# Patient Record
Sex: FEMALE | Race: WHITE | NOT HISPANIC OR LATINO | Employment: OTHER | URBAN - METROPOLITAN AREA
[De-identification: names, ages, dates, MRNs, and addresses within clinical notes are randomized per-mention and may not be internally consistent; named-entity substitution may affect disease eponyms.]

---

## 2021-02-04 ENCOUNTER — PRE-ADMISSION TESTING (OUTPATIENT)
Dept: ADMISSIONS | Facility: MEDICAL CENTER | Age: 36
End: 2021-02-04
Attending: OTOLARYNGOLOGY
Payer: COMMERCIAL

## 2021-02-04 RX ORDER — LORATADINE 10 MG
2 CAPSULE ORAL 2 TIMES DAILY PRN
COMMUNITY
End: 2021-08-06

## 2021-02-04 RX ORDER — DIPHENHYDRAMINE HCL 25 MG
25 TABLET ORAL EVERY 6 HOURS PRN
COMMUNITY
End: 2021-08-06

## 2021-02-04 RX ORDER — ALBUTEROL SULFATE 90 UG/1
2 AEROSOL, METERED RESPIRATORY (INHALATION) EVERY 6 HOURS PRN
COMMUNITY

## 2021-02-04 RX ORDER — SUMATRIPTAN 50 MG/1
50 TABLET, FILM COATED ORAL
COMMUNITY
End: 2021-08-06

## 2021-02-04 RX ORDER — VALACYCLOVIR HYDROCHLORIDE 500 MG/1
500 TABLET, FILM COATED ORAL DAILY
COMMUNITY
End: 2021-10-04

## 2021-02-04 RX ORDER — LORATADINE 10 MG/1
10 TABLET ORAL DAILY
COMMUNITY
End: 2021-08-06

## 2021-02-04 SDOH — HEALTH STABILITY: MENTAL HEALTH: HOW OFTEN DO YOU HAVE A DRINK CONTAINING ALCOHOL?: 2-4 TIMES A MONTH

## 2021-02-06 ENCOUNTER — PRE-ADMISSION TESTING (OUTPATIENT)
Dept: ADMISSIONS | Facility: MEDICAL CENTER | Age: 36
End: 2021-02-06
Attending: OTOLARYNGOLOGY
Payer: COMMERCIAL

## 2021-02-06 DIAGNOSIS — Z01.812 PRE-OPERATIVE LABORATORY EXAMINATION: ICD-10-CM

## 2021-02-06 LAB
COVID ORDER STATUS COVID19: NORMAL
SARS-COV-2 RNA RESP QL NAA+PROBE: NOTDETECTED
SPECIMEN SOURCE: NORMAL

## 2021-02-06 PROCEDURE — U0005 INFEC AGEN DETEC AMPLI PROBE: HCPCS

## 2021-02-06 PROCEDURE — C9803 HOPD COVID-19 SPEC COLLECT: HCPCS

## 2021-02-06 PROCEDURE — U0003 INFECTIOUS AGENT DETECTION BY NUCLEIC ACID (DNA OR RNA); SEVERE ACUTE RESPIRATORY SYNDROME CORONAVIRUS 2 (SARS-COV-2) (CORONAVIRUS DISEASE [COVID-19]), AMPLIFIED PROBE TECHNIQUE, MAKING USE OF HIGH THROUGHPUT TECHNOLOGIES AS DESCRIBED BY CMS-2020-01-R: HCPCS

## 2021-02-10 ENCOUNTER — HOSPITAL ENCOUNTER (OUTPATIENT)
Dept: RADIOLOGY | Facility: MEDICAL CENTER | Age: 36
End: 2021-02-10
Payer: COMMERCIAL

## 2021-02-11 ENCOUNTER — HOSPITAL ENCOUNTER (OUTPATIENT)
Facility: MEDICAL CENTER | Age: 36
End: 2021-02-11
Attending: OTOLARYNGOLOGY | Admitting: OTOLARYNGOLOGY
Payer: COMMERCIAL

## 2021-02-11 ENCOUNTER — ANESTHESIA (OUTPATIENT)
Dept: SURGERY | Facility: MEDICAL CENTER | Age: 36
End: 2021-02-11
Payer: COMMERCIAL

## 2021-02-11 ENCOUNTER — ANESTHESIA EVENT (OUTPATIENT)
Dept: SURGERY | Facility: MEDICAL CENTER | Age: 36
End: 2021-02-11
Payer: COMMERCIAL

## 2021-02-11 VITALS
SYSTOLIC BLOOD PRESSURE: 157 MMHG | WEIGHT: 225.75 LBS | HEIGHT: 61 IN | BODY MASS INDEX: 42.62 KG/M2 | DIASTOLIC BLOOD PRESSURE: 81 MMHG | RESPIRATION RATE: 14 BRPM | HEART RATE: 100 BPM | TEMPERATURE: 97.1 F | OXYGEN SATURATION: 94 %

## 2021-02-11 LAB
HCG UR QL: NEGATIVE
PATHOLOGY CONSULT NOTE: NORMAL

## 2021-02-11 PROCEDURE — 700111 HCHG RX REV CODE 636 W/ 250 OVERRIDE (IP): Performed by: ANESTHESIOLOGY

## 2021-02-11 PROCEDURE — 160035 HCHG PACU - 1ST 60 MINS PHASE I: Performed by: OTOLARYNGOLOGY

## 2021-02-11 PROCEDURE — 700105 HCHG RX REV CODE 258: Performed by: OTOLARYNGOLOGY

## 2021-02-11 PROCEDURE — 502573 HCHG PACK, ENT: Performed by: OTOLARYNGOLOGY

## 2021-02-11 PROCEDURE — 500331 HCHG COTTONOID, SURG PATTIE: Performed by: OTOLARYNGOLOGY

## 2021-02-11 PROCEDURE — 501838 HCHG SUTURE GENERAL: Performed by: OTOLARYNGOLOGY

## 2021-02-11 PROCEDURE — 81025 URINE PREGNANCY TEST: CPT

## 2021-02-11 PROCEDURE — 160036 HCHG PACU - EA ADDL 30 MINS PHASE I: Performed by: OTOLARYNGOLOGY

## 2021-02-11 PROCEDURE — A9270 NON-COVERED ITEM OR SERVICE: HCPCS | Performed by: ANESTHESIOLOGY

## 2021-02-11 PROCEDURE — 160029 HCHG SURGERY MINUTES - 1ST 30 MINS LEVEL 4: Performed by: OTOLARYNGOLOGY

## 2021-02-11 PROCEDURE — 700102 HCHG RX REV CODE 250 W/ 637 OVERRIDE(OP)

## 2021-02-11 PROCEDURE — 700101 HCHG RX REV CODE 250: Performed by: OTOLARYNGOLOGY

## 2021-02-11 PROCEDURE — 700102 HCHG RX REV CODE 250 W/ 637 OVERRIDE(OP): Performed by: ANESTHESIOLOGY

## 2021-02-11 PROCEDURE — 160046 HCHG PACU - 1ST 60 MINS PHASE II: Performed by: OTOLARYNGOLOGY

## 2021-02-11 PROCEDURE — 88311 DECALCIFY TISSUE: CPT

## 2021-02-11 PROCEDURE — 160048 HCHG OR STATISTICAL LEVEL 1-5: Performed by: OTOLARYNGOLOGY

## 2021-02-11 PROCEDURE — A9270 NON-COVERED ITEM OR SERVICE: HCPCS

## 2021-02-11 PROCEDURE — 88305 TISSUE EXAM BY PATHOLOGIST: CPT

## 2021-02-11 PROCEDURE — 700101 HCHG RX REV CODE 250: Performed by: ANESTHESIOLOGY

## 2021-02-11 PROCEDURE — 160041 HCHG SURGERY MINUTES - EA ADDL 1 MIN LEVEL 4: Performed by: OTOLARYNGOLOGY

## 2021-02-11 PROCEDURE — C1894 INTRO/SHEATH, NON-LASER: HCPCS | Performed by: OTOLARYNGOLOGY

## 2021-02-11 PROCEDURE — 160002 HCHG RECOVERY MINUTES (STAT): Performed by: OTOLARYNGOLOGY

## 2021-02-11 PROCEDURE — A9270 NON-COVERED ITEM OR SERVICE: HCPCS | Performed by: OTOLARYNGOLOGY

## 2021-02-11 PROCEDURE — 700111 HCHG RX REV CODE 636 W/ 250 OVERRIDE (IP): Performed by: OTOLARYNGOLOGY

## 2021-02-11 PROCEDURE — 160025 RECOVERY II MINUTES (STATS): Performed by: OTOLARYNGOLOGY

## 2021-02-11 PROCEDURE — 160009 HCHG ANES TIME/MIN: Performed by: OTOLARYNGOLOGY

## 2021-02-11 PROCEDURE — 160047 HCHG PACU  - EA ADDL 30 MINS PHASE II: Performed by: OTOLARYNGOLOGY

## 2021-02-11 DEVICE — IMPLANT FUROATE MOMETASONE 8MM (1/EA): Type: IMPLANTABLE DEVICE | Site: NOSE | Status: FUNCTIONAL

## 2021-02-11 RX ORDER — HALOPERIDOL 5 MG/ML
1 INJECTION INTRAMUSCULAR
Status: DISCONTINUED | OUTPATIENT
Start: 2021-02-11 | End: 2021-02-11 | Stop reason: HOSPADM

## 2021-02-11 RX ORDER — OXYCODONE HYDROCHLORIDE AND ACETAMINOPHEN 5; 325 MG/1; MG/1
1 TABLET ORAL
Status: DISCONTINUED | OUTPATIENT
Start: 2021-02-11 | End: 2021-02-11 | Stop reason: HOSPADM

## 2021-02-11 RX ORDER — SODIUM CHLORIDE, SODIUM LACTATE, POTASSIUM CHLORIDE, CALCIUM CHLORIDE 600; 310; 30; 20 MG/100ML; MG/100ML; MG/100ML; MG/100ML
INJECTION, SOLUTION INTRAVENOUS CONTINUOUS
Status: ACTIVE | OUTPATIENT
Start: 2021-02-11 | End: 2021-02-11

## 2021-02-11 RX ORDER — EPINEPHRINE 1 MG/ML
INJECTION INTRAMUSCULAR; INTRAVENOUS; SUBCUTANEOUS
Status: DISCONTINUED
Start: 2021-02-11 | End: 2021-02-11 | Stop reason: HOSPADM

## 2021-02-11 RX ORDER — MEPERIDINE HYDROCHLORIDE 25 MG/ML
12.5 INJECTION INTRAMUSCULAR; INTRAVENOUS; SUBCUTANEOUS
Status: DISCONTINUED | OUTPATIENT
Start: 2021-02-11 | End: 2021-02-11 | Stop reason: HOSPADM

## 2021-02-11 RX ORDER — METOPROLOL TARTRATE 1 MG/ML
1 INJECTION, SOLUTION INTRAVENOUS
Status: DISCONTINUED | OUTPATIENT
Start: 2021-02-11 | End: 2021-02-11 | Stop reason: HOSPADM

## 2021-02-11 RX ORDER — HYDROMORPHONE HYDROCHLORIDE 1 MG/ML
0.1 INJECTION, SOLUTION INTRAMUSCULAR; INTRAVENOUS; SUBCUTANEOUS
Status: DISCONTINUED | OUTPATIENT
Start: 2021-02-11 | End: 2021-02-11 | Stop reason: HOSPADM

## 2021-02-11 RX ORDER — LABETALOL HYDROCHLORIDE 5 MG/ML
5 INJECTION, SOLUTION INTRAVENOUS
Status: DISCONTINUED | OUTPATIENT
Start: 2021-02-11 | End: 2021-02-11 | Stop reason: HOSPADM

## 2021-02-11 RX ORDER — SODIUM CHLORIDE, SODIUM LACTATE, POTASSIUM CHLORIDE, CALCIUM CHLORIDE 600; 310; 30; 20 MG/100ML; MG/100ML; MG/100ML; MG/100ML
INJECTION, SOLUTION INTRAVENOUS CONTINUOUS
Status: DISCONTINUED | OUTPATIENT
Start: 2021-02-11 | End: 2021-02-11 | Stop reason: HOSPADM

## 2021-02-11 RX ORDER — CEFAZOLIN SODIUM 1 G/3ML
INJECTION, POWDER, FOR SOLUTION INTRAMUSCULAR; INTRAVENOUS PRN
Status: DISCONTINUED | OUTPATIENT
Start: 2021-02-11 | End: 2021-02-11 | Stop reason: SURG

## 2021-02-11 RX ORDER — DEXAMETHASONE SODIUM PHOSPHATE 4 MG/ML
INJECTION, SOLUTION INTRA-ARTICULAR; INTRALESIONAL; INTRAMUSCULAR; INTRAVENOUS; SOFT TISSUE PRN
Status: DISCONTINUED | OUTPATIENT
Start: 2021-02-11 | End: 2021-02-11 | Stop reason: SURG

## 2021-02-11 RX ORDER — LIDOCAINE HYDROCHLORIDE 10 MG/ML
INJECTION, SOLUTION INFILTRATION; PERINEURAL
Status: DISCONTINUED
Start: 2021-02-11 | End: 2021-02-11 | Stop reason: HOSPADM

## 2021-02-11 RX ORDER — HYDROMORPHONE HYDROCHLORIDE 1 MG/ML
0.4 INJECTION, SOLUTION INTRAMUSCULAR; INTRAVENOUS; SUBCUTANEOUS
Status: DISCONTINUED | OUTPATIENT
Start: 2021-02-11 | End: 2021-02-11 | Stop reason: HOSPADM

## 2021-02-11 RX ORDER — TRANEXAMIC ACID 100 MG/ML
INJECTION, SOLUTION INTRAVENOUS PRN
Status: DISCONTINUED | OUTPATIENT
Start: 2021-02-11 | End: 2021-02-11 | Stop reason: SURG

## 2021-02-11 RX ORDER — ONDANSETRON 2 MG/ML
INJECTION INTRAMUSCULAR; INTRAVENOUS PRN
Status: DISCONTINUED | OUTPATIENT
Start: 2021-02-11 | End: 2021-02-11 | Stop reason: SURG

## 2021-02-11 RX ORDER — EPINEPHRINE 1 MG/ML(1)
AMPUL (ML) INJECTION
Status: DISCONTINUED
Start: 2021-02-11 | End: 2021-02-11 | Stop reason: HOSPADM

## 2021-02-11 RX ORDER — METOPROLOL TARTRATE 1 MG/ML
INJECTION, SOLUTION INTRAVENOUS PRN
Status: DISCONTINUED | OUTPATIENT
Start: 2021-02-11 | End: 2021-02-11 | Stop reason: SURG

## 2021-02-11 RX ORDER — HYDROMORPHONE HYDROCHLORIDE 1 MG/ML
0.2 INJECTION, SOLUTION INTRAMUSCULAR; INTRAVENOUS; SUBCUTANEOUS
Status: DISCONTINUED | OUTPATIENT
Start: 2021-02-11 | End: 2021-02-11 | Stop reason: HOSPADM

## 2021-02-11 RX ORDER — LIDOCAINE HYDROCHLORIDE AND EPINEPHRINE 10; 10 MG/ML; UG/ML
INJECTION, SOLUTION INFILTRATION; PERINEURAL
Status: DISCONTINUED | OUTPATIENT
Start: 2021-02-11 | End: 2021-02-11 | Stop reason: HOSPADM

## 2021-02-11 RX ORDER — GINSENG 100 MG
CAPSULE ORAL
Status: DISCONTINUED | OUTPATIENT
Start: 2021-02-11 | End: 2021-02-11 | Stop reason: HOSPADM

## 2021-02-11 RX ORDER — EPINEPHRINE 1 MG/ML(1)
AMPUL (ML) INJECTION
Status: DISCONTINUED | OUTPATIENT
Start: 2021-02-11 | End: 2021-02-11 | Stop reason: HOSPADM

## 2021-02-11 RX ORDER — MIDAZOLAM HYDROCHLORIDE 1 MG/ML
INJECTION INTRAMUSCULAR; INTRAVENOUS PRN
Status: DISCONTINUED | OUTPATIENT
Start: 2021-02-11 | End: 2021-02-11 | Stop reason: SURG

## 2021-02-11 RX ORDER — OXYCODONE HYDROCHLORIDE AND ACETAMINOPHEN 5; 325 MG/1; MG/1
2 TABLET ORAL
Status: DISCONTINUED | OUTPATIENT
Start: 2021-02-11 | End: 2021-02-11 | Stop reason: HOSPADM

## 2021-02-11 RX ORDER — BACITRACIN ZINC 500 [USP'U]/G
OINTMENT TOPICAL
Status: DISCONTINUED
Start: 2021-02-11 | End: 2021-02-11 | Stop reason: HOSPADM

## 2021-02-11 RX ORDER — DIPHENHYDRAMINE HYDROCHLORIDE 50 MG/ML
12.5 INJECTION INTRAMUSCULAR; INTRAVENOUS
Status: DISCONTINUED | OUTPATIENT
Start: 2021-02-11 | End: 2021-02-11 | Stop reason: HOSPADM

## 2021-02-11 RX ADMIN — FENTANYL CITRATE 100 MCG: 50 INJECTION, SOLUTION INTRAMUSCULAR; INTRAVENOUS at 09:16

## 2021-02-11 RX ADMIN — PROPOFOL 200 MG: 10 INJECTION, EMULSION INTRAVENOUS at 09:16

## 2021-02-11 RX ADMIN — ONDANSETRON 4 MG: 2 INJECTION INTRAMUSCULAR; INTRAVENOUS at 11:08

## 2021-02-11 RX ADMIN — FENTANYL CITRATE 50 MCG: 50 INJECTION, SOLUTION INTRAMUSCULAR; INTRAVENOUS at 10:30

## 2021-02-11 RX ADMIN — FENTANYL CITRATE 50 MCG: 50 INJECTION, SOLUTION INTRAMUSCULAR; INTRAVENOUS at 12:05

## 2021-02-11 RX ADMIN — FENTANYL CITRATE 50 MCG: 50 INJECTION, SOLUTION INTRAMUSCULAR; INTRAVENOUS at 09:46

## 2021-02-11 RX ADMIN — SODIUM CHLORIDE, POTASSIUM CHLORIDE, SODIUM LACTATE AND CALCIUM CHLORIDE: 600; 310; 30; 20 INJECTION, SOLUTION INTRAVENOUS at 08:38

## 2021-02-11 RX ADMIN — MIDAZOLAM HYDROCHLORIDE 2 MG: 1 INJECTION, SOLUTION INTRAMUSCULAR; INTRAVENOUS at 09:07

## 2021-02-11 RX ADMIN — METOPROLOL TARTRATE 1 MG: 5 INJECTION, SOLUTION INTRAVENOUS at 09:24

## 2021-02-11 RX ADMIN — OXYCODONE HYDROCHLORIDE AND ACETAMINOPHEN 2 TABLET: 5; 325 TABLET ORAL at 12:05

## 2021-02-11 RX ADMIN — SUGAMMADEX 200 MG: 100 INJECTION, SOLUTION INTRAVENOUS at 11:03

## 2021-02-11 RX ADMIN — CEFAZOLIN 2 G: 330 INJECTION, POWDER, FOR SOLUTION INTRAMUSCULAR; INTRAVENOUS at 09:15

## 2021-02-11 RX ADMIN — ROCURONIUM BROMIDE 60 MG: 10 INJECTION, SOLUTION INTRAVENOUS at 09:16

## 2021-02-11 RX ADMIN — POVIDONE IODINE 15 ML: 100 SOLUTION TOPICAL at 08:38

## 2021-02-11 RX ADMIN — FENTANYL CITRATE 50 MCG: 50 INJECTION, SOLUTION INTRAMUSCULAR; INTRAVENOUS at 11:01

## 2021-02-11 RX ADMIN — DEXAMETHASONE SODIUM PHOSPHATE 8 MG: 4 INJECTION, SOLUTION INTRA-ARTICULAR; INTRALESIONAL; INTRAMUSCULAR; INTRAVENOUS; SOFT TISSUE at 09:04

## 2021-02-11 RX ADMIN — FENTANYL CITRATE 25 MCG: 50 INJECTION, SOLUTION INTRAMUSCULAR; INTRAVENOUS at 13:40

## 2021-02-11 RX ADMIN — FENTANYL CITRATE 25 MCG: 50 INJECTION, SOLUTION INTRAMUSCULAR; INTRAVENOUS at 12:33

## 2021-02-11 RX ADMIN — TRANEXAMIC ACID 1000 MG: 100 INJECTION, SOLUTION INTRAVENOUS at 11:29

## 2021-02-11 ASSESSMENT — PAIN DESCRIPTION - PAIN TYPE
TYPE: SURGICAL PAIN

## 2021-02-11 ASSESSMENT — PAIN SCALES - GENERAL: PAIN_LEVEL: 2

## 2021-02-11 NOTE — ANESTHESIA PROCEDURE NOTES
Airway    Date/Time: 2/11/2021 9:20 AM  Performed by: Michi Cantu D.O.  Authorized by: Michi Cantu D.O.     Location:  OR  Urgency:  Elective  Indications for Airway Management:  Anesthesia      Spontaneous Ventilation: absent    Sedation Level:  Deep  Preoxygenated: Yes    Patient Position:  Sniffing  Mask Difficulty Assessment:  1 - vent by mask  Final Airway Type:  Endotracheal airway  Final Endotracheal Airway:  ETT  Cuffed: Yes    Technique Used for Successful ETT Placement:  Direct laryngoscopy    Insertion Site:  Oral  Blade Type:  Aren  Laryngoscope Blade/Videolaryngoscope Blade Size:  3  ETT Size (mm):  7.5  Measured from:  Lips  ETT to Lips (cm):  21  Placement Verified by: auscultation and capnometry    Cormack-Lehane Classification:  Grade I - full view of glottis  Number of Attempts at Approach:  1

## 2021-02-11 NOTE — ANESTHESIA POSTPROCEDURE EVALUATION
Patient: Guilherme Flaherty    Procedure Summary     Date: 02/11/21 Room / Location: [facility] ROOM 22 / SURGERY SAME DAY [facility] [place]    Anesthesia Start: 0904 Anesthesia Stop: 1151    Procedures:       SEPTOPLASTY, NOSE (Bilateral Nose)      STEREOTACTIC COMPUTER ASSISTED PROCEDURE CRANIAL,EXTRADURAL-NAVIGATIONAL (Bilateral Nose)      ENDOSCOPY, PARANASAL SINUS, WITH TOTAL ETHMOIDECTOMY, SPHENOIDOTOMY, MAXILLARY ANTROSTOMY, SPHEN+MAX SIN TISS REM, AND EXPLORATION FRONT SIN (Bilateral Nose) Diagnosis: (OTHER POLYP OF SINUS, CHRONIC PANSINUSITIS)    Surgeons: Joao Luque M.D. Responsible Provider: Michi Cantu D.O.    Anesthesia Type: general ASA Status: 2          Final Anesthesia Type: general  Last vitals  BP   Blood Pressure: (P) 133/80    Temp   (P) 36.2 °C (97.1 °F)    Pulse   95   Resp   (P) 20    SpO2   95 %      Anesthesia Post Evaluation    Patient location during evaluation: PACU  Patient participation: complete - patient participated  Level of consciousness: awake and alert  Pain score: 2    Airway patency: patent  Anesthetic complications: no  Cardiovascular status: hemodynamically stable  Respiratory status: acceptable  Hydration status: euvolemic    PONV: none          There were no known complications for this encounter.

## 2021-02-11 NOTE — OR SURGEON
Immediate Post OP Note    PreOp Diagnosis: CRS with polyps, septal deviation     PostOp Diagnosis: same     Procedure(s):  1. septo  2. B max   3. B total ethmoid  4. B sphenoid  5. B frontal   6. With IGS   Surgeon(s):  Joao Luque M.D.    Anesthesiologist/Type of Anesthesia:  Anesthesiologist: Michi Cantu D.O./General    Surgical Staff:  Circulator: Jessica Adame R.N.  Scrub Person: Blas Banda    Specimens removed if any:  ID Type Source Tests Collected by Time Destination   A : Left sinus contents Tissue Sinus PATHOLOGY SPECIMEN Joao Luque M.D. 2/11/2021  9:51 AM    B : Right sinus contents Tissue Sinus PATHOLOGY SPECIMEN Joao Luque M.D. 2/11/2021  9:56 AM        Estimated Blood Loss: 100cc    Findings: polyps    Complications: none        2/11/2021 11:41 AM Joao Luque M.D.

## 2021-02-11 NOTE — DISCHARGE INSTRUCTIONS
ACTIVITY: Rest and take it easy for the first 24 hours.  A responsible adult is recommended to remain with you during that time.  It is normal to feel sleepy.  We encourage you to not do anything that requires balance, judgment or coordination.    MILD FLU-LIKE SYMPTOMS ARE NORMAL. YOU MAY EXPERIENCE GENERALIZED MUSCLE ACHES, THROAT IRRITATION, HEADACHE AND/OR SOME NAUSEA.    FOR 24 HOURS DO NOT:  Drive, operate machinery or run household appliances.  Drink beer or alcoholic beverages.   Make important decisions or sign legal documents.    SPECIAL INSTRUCTIONS:     neilmed rinses 5x/day start tomorrow     DIET: To avoid nausea, slowly advance diet as tolerated, avoiding spicy or greasy foods for the first day.  Add more substantial food to your diet according to your physician's instructions.  Babies can be fed formula or breast milk as soon as they are hungry.  INCREASE FLUIDS AND FIBER TO AVOID CONSTIPATION.    SURGICAL DRESSING/BATHING: Ok to shower tomorrow, do not submerge nose in water, avoid getting water in nose.     FOLLOW-UP APPOINTMENT:  A follow-up appointment should be arranged with your doctor in 1 week; call to schedule.    You should CALL YOUR PHYSICIAN if you develop:  Fever greater than 101 degrees F.  Pain not relieved by medication, or persistent nausea or vomiting.  Excessive bleeding (blood soaking through dressing) or unexpected drainage from the wound.  Extreme redness or swelling around the incision site, drainage of pus or foul smelling drainage.  Inability to urinate or empty your bladder within 8 hours.  Problems with breathing or chest pain.    You should call 911 if you develop problems with breathing or chest pain.  If you are unable to contact your doctor or surgical center, you should go to the nearest emergency room or urgent care center.      Dr. Luque's Telephone #:  [phone]    If any questions arise, call your doctor.  If your doctor is not available, please feel free to  call the Surgical Center at [phone]. The Contact Center is open Monday through Friday 7AM to 5PM and may speak to a nurse at [phone], or toll free at [phone].     A registered nurse may call you a few days after your surgery to see how you are doing after your procedure.    MEDICATIONS: Resume taking daily medication.  Take prescribed pain medication with food.  If no medication is prescribed, you may take non-aspirin pain medication if needed.  PAIN MEDICATION CAN BE VERY CONSTIPATING.  Take a stool softener or laxative such as senokot, pericolace, or milk of magnesia if needed.    Prescription given for prednisone, augmentin, diflucan at pre op appointment.  Last pain medication given at 12:05 pm (Percocet 10 mg).    If your physician has prescribed pain medication that includes Acetaminophen (Tylenol), do not take additional Acetaminophen (Tylenol) while taking the prescribed medication.    Depression / Suicide Risk    As you are discharged from this [facility] facility, it is important to learn how to keep safe from harming yourself.    Recognize the warning signs:  · Abrupt changes in personality, positive or negative- including increase in energy   · Giving away possessions  · Change in eating patterns- significant weight changes-  positive or negative  · Change in sleeping patterns- unable to sleep or sleeping all the time   · Unwillingness or inability to communicate  · Depression  · Unusual sadness, discouragement and loneliness  · Talk of wanting to die  · Neglect of personal appearance   · Rebelliousness- reckless behavior  · Withdrawal from people/activities they love  · Confusion- inability to concentrate     If you or a loved one observes any of these behaviors or has concerns about self-harm, here's what you can do:  · Talk about it- your feelings and reasons for harming yourself  · Remove any means that you might use to hurt yourself (examples: pills, rope, extension cords,  firearm)  · Get professional help from the community (Mental Health, Substance Abuse, psychological counseling)  · Do not be alone:Call your Safe Contact- someone whom you trust who will be there for you.  · Call your local CRISIS HOTLINE 406-2327 or 708-605-5705  · Call your local Children's Mobile Crisis Response Team [place] (860) 863-5780 or www.[employer]  · Call the toll free National Suicide Prevention Hotlines   · National Suicide Prevention Lifeline 384-718-VZWP (2240)  · National Hope Line Network 800-SUICIDE (608-5449)

## 2021-02-11 NOTE — OR NURSING
1257 assumed care from TADEO Medrano. Pt states pain level 3/10.     1330 changed into cloth gown for comfort    1340 IV fentanyl 25mcg given for 4/10 nose and face pain. Handoff to TADEO Medrano

## 2021-02-11 NOTE — OP REPORT
DATE OF SERVICE:  02/11/2021     PREOPERATIVE DIAGNOSES:  1.  Chronic sinusitis with polyps.  2.  Aspirin-exacerbated respiratory disease.  3.  Septal deviation.     POSTOPERATIVE DIAGNOSES:  1.  Chronic sinusitis with polyps.  2.  Aspirin-exacerbated respiratory disease.  3.  Septal deviation.     PROCEDURES PERFORMED:  1.  Septoplasty.  2.  Bilateral frontal sinusotomy.  3.  Bilateral total ethmoidectomy.  4.  Bilateral maxillary antrostomy with tissue removal.  5.  Bilateral sphenoidotomy and tissue removal.  6.  With image guidance.     SURGEON:  Joao Luque MD     ASSISTANT:  None.     ANESTHESIA:  General endotracheal (none).     ESTIMATED BLOOD LOSS:  100 mL.     COMPLICATIONS:  None.     SPECIMENS:  Right and left sinus contents.     INDICATIONS:  This is a 35-year-old female who has aspirin-exacerbated   respiratory disease.  She has chronic sinusitis with polyps.  She underwent   sinus surgery previously with Dr. Moore.  She has had a massive recurrence of   her polyps.  She has failed prednisone, Augmentin and budesonide rinses.    After discussing the risks, benefits and alternatives, she elected to undergo   the above procedure.  Of note, I discussed the polyps will recur.  She also   has pretty severe headaches preoperatively, which may or may not be related to   her chronic sinusitis.     DESCRIPTION OF PROCEDURE:  The patient was brought to the operating room and   was intubated by anesthesia, turned 90 degrees.  She was given preoperative   antibiotics and steroids.  She was draped in the usual sterile fashion.  A   timeout was performed.  The 1:1,000 epinephrine soaked pledgets stained with   fluorescein were placed in the nasal cavity.  Once vasoconstriction was   allowed to take effect, I injected the polyps since they were almost coming   out of the nose.  I injected the septum as well.  Later on, once I had   debulked the polyps, I injected the right and left maxillary lines and    sphenopalatine region with 1% lidocaine with 1:100,000 epinephrine.  I began   surgery on the left hand side.  There were large polyps, here, which I   removed.  I was able then to identify the middle turbinate, which actually was   not growing polyps. I resected polyps in the anterior ethmoids.  I registered   the image guidance, was found to be in good fidelity in all 3 planes.  It was   required for this case given the involving the frontal sphenoid polyps and   previous surgery.     I began on the left side I cleared out the nasal cavity polyps.  I cleared out the   anterior ethmoid and then I used a 30-degree endoscope.  The left maxillary   was filled with polyps.  I used a 90-degree microdebrider blade to resect   these.  I used a curved scissor to widen the antrostomy posteriorly and the   straight through-cutting instruments to remove this.  There was some bleeding   from probably the vessel going to the middle turbinate, which was controlled   with suction cautery on a coag of 15.  I resected some residual uncinate and   then I ensured that the natural ostium was confluent with the surgical ostium.    I then used a 90-degree microdebrider blade to clear all polyps as much as I   could reach from the maxillary.  I switched back to the 0 and the straight   microdebrider blade, which was image guided I used throughout the case.  I   then went through the basal lamella at the intersection of horizontal vertical   portion.  I visualized the superior turbinate.  I got back to the face of the   sphenoid and resected polyps identified.  I was able to cannulate the   sphenoid and then I widened it.  I used a mushroom punch and Kerrisons to   remove the face of the sphenoid.  I identified the skull base and the orbit.    I then cleared partitions in a posterior to anterior manner in the ethmoids.    I performed agger nasi punch.  I then was able to cannulate both the   supraorbital cell and the frontal.  I  switched over the 45-degree scope and I   used a Cobra to widen this further and the 70-degree curette.  At this point,   I was happy with my dissection.       I debulked the polyps filling the right nasal cavity that were almost growing out of the   nostril, so I could inject and also to give access for the septoplasty.    I then turned my attention to the septum.  I   made an incision a centimeter back from the caudal septum on the right hand   side, elevating the subperichondrial subperiosteal plane on the right.  I went   through the septum posterior to my incision about 0.5 cm, elevated a   subperichondrial subperiosteal plane on the left hand side.  I resected the   deviated bone and cartilage until the septal deviation including the right   spur was reduced.  At the end of the case, I closed the septum with a quilting   4-0 plain gut suture.  I then turned my attention to the right sinuses.    Previously,  Now   that I could visualize the middle turbinate, which had been previously   partially resected, I could see polyps growing off the orbit, which I   resected.  I resected the intact uncinate.  I used a 30-degree endoscope.  I   widened the antrostomy widely posteriorly using a scissor, side biter and the   microdebrider.  I used a 90-degree microdebrider to resect polyps out of the   maxillary.  The natural ostium was confluent with the surgical ostium.  I   switched back to the 0.  There were some anterior ethmoid polyps and   partitions that I resected.  I went through the basal lamella at the   intersection of horizontal and vertical portion.  I visualized the superior   turbinate, which was polypoid.  I resected the inferior aspect of this.  I   identified the natural ostium of the sphenoid and I widened it using   microdebrider, Kerrisons and the mushroom punch.  I then cleared.  I could   identify the orbit and skull base.  I then cleared partitions in a posterior   to anterior manner.  I then  performed an agger nasi punch.  I switched over to   the 45-degree scope.  I could cannulate her frontal anatomy.  It was more   complicated on this side.  She had a very small medially based opening   anteriorly.  I was able to cannulate this and then using a Cobra and the   curved suction, I was able to resect the partition, the type 1 or type 2   anterior ethmoid cell that was along the orbit as there was little to resect   this until I could pass the image guidance suction into the frontal.  I used a   70-degree to visualize it as well.  I then was happy with how they were opened to the widest that I   could get it with the limitations of her anatomy.  Hemostasis was good.  I   closed the septum as described.  I then used a 45-degree endoscope to place   the Propel contours into each frontal sinus until they were in good position.    Hemostasis was good.  I placed bacitracin-coated finger cotton in each middle   meatus and secured it with 2 x 2.  The patient tolerated the procedure well.        ______________________________  MD TYSON Taylor/KAMILAH    DD:  02/11/2021 11:48  DT:  02/11/2021 13:07    Job#:  [id number]

## 2021-02-11 NOTE — OR NURSING
1147 Pt to PACU from OR. Report from anesthesia and OR RN. OA in place, on 8 L mask O2, chin lift by anesthesiologist. Respirations even and unlabored. VSS.     1148 Arousing, OA dc'd by anesthesia.     1205 Medicated for 7/10 surgical pain.    1220 Medicated again for 4/10 pain.    1257 Report to Anayeli MARIEE RN. Meets phase II criteria.    1340 Re assumed care. Medicated for 6/10 pain. Declines further needs at this time.    1410 States pain is 5/10, burning in nose. Offered narcotic pain intervention, declining at this time. DC'd nasal fingercotts.    1429 Waking at this time, declines needs.     1440 Ready for dc, changing independently. Instructions given over phone to .    1452 Discharge orders received. Meets discharge criteria at this time. Tolerable pain. No nausea. Tolerting PO. On RA. All lines and monitors discontinued. Reviewed discharge paperwork with pt. Discussed diet, activity, medications, follow up care and worsening symptoms. No questions at this time. Pt to be discharged to home via private vehicle. Pt escorted out of department in wheelchair with CNA and all belongings including glasses.

## 2021-02-11 NOTE — ANESTHESIA TIME REPORT
Anesthesia Start and Stop Event Times     Date Time Event    2/11/2021 0850 Ready for Procedure     0904 Anesthesia Start     1151 Anesthesia Stop        Responsible Staff  02/11/21    Name Role Begin End    Michi Cantu D.O. Anesth 0904 1151        Preop Diagnosis (Free Text):  Pre-op Diagnosis     OTHER POLYP OF SINUS, CHRONIC PANSINUSITIS        Preop Diagnosis (Codes):    Post op Diagnosis  Chronic pansinusitis      Premium Reason  Non-Premium    Comments:

## 2021-02-11 NOTE — ANESTHESIA PREPROCEDURE EVALUATION
Relevant Problems   CARDIAC   (+) Migraine       Physical Exam    Anesthesia Plan    ASA 2       Plan - general       Airway plan will be ETT          Induction: intravenous    Postoperative Plan: Postoperative administration of opioids is intended.    Pertinent diagnostic labs and testing reviewed    Informed Consent:    Anesthetic plan and risks discussed with patient.    Use of blood products discussed with: patient whom consented to blood products.

## 2021-02-15 ENCOUNTER — PRE-ADMISSION TESTING (OUTPATIENT)
Dept: ADMISSIONS | Facility: MEDICAL CENTER | Age: 36
End: 2021-02-15
Attending: OTOLARYNGOLOGY
Payer: COMMERCIAL

## 2021-02-15 DIAGNOSIS — Z01.812 PRE-OPERATIVE LABORATORY EXAMINATION: ICD-10-CM

## 2021-02-15 PROCEDURE — U0003 INFECTIOUS AGENT DETECTION BY NUCLEIC ACID (DNA OR RNA); SEVERE ACUTE RESPIRATORY SYNDROME CORONAVIRUS 2 (SARS-COV-2) (CORONAVIRUS DISEASE [COVID-19]), AMPLIFIED PROBE TECHNIQUE, MAKING USE OF HIGH THROUGHPUT TECHNOLOGIES AS DESCRIBED BY CMS-2020-01-R: HCPCS

## 2021-02-15 PROCEDURE — U0005 INFEC AGEN DETEC AMPLI PROBE: HCPCS

## 2021-02-15 PROCEDURE — C9803 HOPD COVID-19 SPEC COLLECT: HCPCS

## 2021-02-18 ENCOUNTER — HOSPITAL ENCOUNTER (OUTPATIENT)
Facility: MEDICAL CENTER | Age: 36
End: 2021-02-18
Attending: OTOLARYNGOLOGY | Admitting: OTOLARYNGOLOGY
Payer: COMMERCIAL

## 2021-02-18 VITALS
RESPIRATION RATE: 16 BRPM | BODY MASS INDEX: 43.29 KG/M2 | DIASTOLIC BLOOD PRESSURE: 85 MMHG | TEMPERATURE: 98.2 F | HEART RATE: 96 BPM | HEIGHT: 61 IN | OXYGEN SATURATION: 96 % | WEIGHT: 229.28 LBS | SYSTOLIC BLOOD PRESSURE: 159 MMHG

## 2021-02-18 PROCEDURE — 160028 HCHG SURGERY MINUTES - 1ST 30 MINS LEVEL 3: Performed by: OTOLARYNGOLOGY

## 2021-02-18 PROCEDURE — 160046 HCHG PACU - 1ST 60 MINS PHASE II: Performed by: OTOLARYNGOLOGY

## 2021-02-18 PROCEDURE — 500331 HCHG COTTONOID, SURG PATTIE: Performed by: OTOLARYNGOLOGY

## 2021-02-18 PROCEDURE — 160048 HCHG OR STATISTICAL LEVEL 1-5: Performed by: OTOLARYNGOLOGY

## 2021-02-18 PROCEDURE — 160025 RECOVERY II MINUTES (STATS): Performed by: OTOLARYNGOLOGY

## 2021-02-18 PROCEDURE — 700102 HCHG RX REV CODE 250 W/ 637 OVERRIDE(OP): Performed by: OTOLARYNGOLOGY

## 2021-02-18 PROCEDURE — 502588 HCHG PACK, MINOR: Performed by: OTOLARYNGOLOGY

## 2021-02-18 PROCEDURE — A9270 NON-COVERED ITEM OR SERVICE: HCPCS | Performed by: OTOLARYNGOLOGY

## 2021-02-18 RX ORDER — AMOXICILLIN AND CLAVULANATE POTASSIUM 875; 125 MG/1; MG/1
1 TABLET, FILM COATED ORAL 2 TIMES DAILY
COMMUNITY
End: 2021-08-06

## 2021-02-18 RX ORDER — LIDOCAINE HYDROCHLORIDE 40 MG/ML
INJECTION, SOLUTION RETROBULBAR
Status: DISCONTINUED
Start: 2021-02-18 | End: 2021-02-18 | Stop reason: HOSPADM

## 2021-02-18 RX ORDER — OXYMETAZOLINE HYDROCHLORIDE 0.05 G/100ML
SPRAY NASAL
Status: DISCONTINUED
Start: 2021-02-18 | End: 2021-02-18 | Stop reason: HOSPADM

## 2021-02-18 RX ORDER — PREDNISONE 10 MG/1
40 TABLET ORAL DAILY
COMMUNITY
End: 2021-08-06

## 2021-02-18 RX ORDER — SUMATRIPTAN 50 MG/1
50 TABLET, FILM COATED ORAL
Status: DISCONTINUED | OUTPATIENT
Start: 2021-02-18 | End: 2021-02-18 | Stop reason: HOSPADM

## 2021-02-18 RX ADMIN — SUMATRIPTAN SUCCINATE 50 MG: 50 TABLET ORAL at 15:10

## 2021-02-18 ASSESSMENT — PAIN DESCRIPTION - PAIN TYPE: TYPE: SURGICAL PAIN

## 2021-02-18 NOTE — OR NURSING
1425- P-96, 02-94, BP-170/96                                                                                             1435-P-96-, 02-97, BP-166/104

## 2021-02-18 NOTE — DISCHARGE INSTRUCTIONS
ACTIVITY: Rest and take it easy for the first 24 hours.  A responsible adult is recommended to remain with you during that time.  It is normal to feel sleepy.  We encourage you to not do anything that requires balance, judgment or coordination.    MILD FLU-LIKE SYMPTOMS ARE NORMAL. YOU MAY EXPERIENCE GENERALIZED MUSCLE ACHES, THROAT IRRITATION, HEADACHE AND/OR SOME NAUSEA.    FOR 24 HOURS DO NOT:  Drive, operate machinery or run household appliances.  Drink beer or alcoholic beverages.   Make important decisions or sign legal documents.    DIET: To avoid nausea, slowly advance diet as tolerated, avoiding spicy or greasy foods for the first day.  Add more substantial food to your diet according to your physician's instructions.  Babies can be fed formula or breast milk as soon as they are hungry.  INCREASE FLUIDS AND FIBER TO AVOID CONSTIPATION.    FOLLOW-UP APPOINTMENT:  A follow-up appointment should be arranged with Dr. Luque [phone]; call to schedule.    You should CALL YOUR PHYSICIAN if you develop:  Fever greater than 101 degrees F.  Pain not relieved by medication, or persistent nausea or vomiting.  Excessive bleeding (blood soaking through dressing) or unexpected drainage from the wound.  Extreme redness or swelling around the incision site, drainage of pus or foul smelling drainage.  Inability to urinate or empty your bladder within 8 hours.  Problems with breathing or chest pain.    You should call 911 if you develop problems with breathing or chest pain.  If you are unable to contact your doctor or surgical center, you should go to the nearest emergency room or urgent care center.    Physician's telephone #: Dr. Luque [phone]    If any questions arise, call your doctor.  If your doctor is not available, please feel free to call the Surgical Center at [phone]. The Contact Center is open Monday through Friday 7AM to 5PM and may speak to a nurse at [phone], or toll free at  [phone].     A registered nurse may call you a few days after your surgery to see how you are doing after your procedure.    MEDICATIONS: Resume taking daily medication.  Take prescribed pain medication with food.  If no medication is prescribed, you may take non-aspirin pain medication if needed.  PAIN MEDICATION CAN BE VERY CONSTIPATING.  Take a stool softener or laxative such as senokot, pericolace, or milk of magnesia if needed.    If your physician has prescribed pain medication that includes Acetaminophen (Tylenol), do not take additional Acetaminophen (Tylenol) while taking the prescribed medication.    Depression / Suicide Risk    As you are discharged from this [facility] facility, it is important to learn how to keep safe from harming yourself.    Recognize the warning signs:  · Abrupt changes in personality, positive or negative- including increase in energy   · Giving away possessions  · Change in eating patterns- significant weight changes-  positive or negative  · Change in sleeping patterns- unable to sleep or sleeping all the time   · Unwillingness or inability to communicate  · Depression  · Unusual sadness, discouragement and loneliness  · Talk of wanting to die  · Neglect of personal appearance   · Rebelliousness- reckless behavior  · Withdrawal from people/activities they love  · Confusion- inability to concentrate     If you or a loved one observes any of these behaviors or has concerns about self-harm, here's what you can do:  · Talk about it- your feelings and reasons for harming yourself  · Remove any means that you might use to hurt yourself (examples: pills, rope, extension cords, firearm)  · Get professional help from the community (Mental Health, Substance Abuse, psychological counseling)  · Do not be alone:Call your Safe Contact- someone whom you trust who will be there for you.  · Call your local CRISIS HOTLINE 364-9258 or 943-935-3827  · Call your local Children's Mobile  Crisis Response Team [place] (502) 667-3682 or www.[employer]  · Call the toll free National Suicide Prevention Hotlines   · National Suicide Prevention Lifeline 051-662-XAFW (7988)  · National Hope Line Network 800-SUICIDE (245-9784)

## 2021-02-18 NOTE — OR SURGEON
Immediate Post OP Note    PreOp Diagnosis: crs with polyps    PostOp Diagnosis: same    Procedure(s):  IRRIGATION, PARANASAL SINUS - INTRAOP POSTOP SINUS DEBRIDEMENT    Surgeon(s):  Joao Luque M.D.    Anesthesiologist/Type of Anesthesia:  No anesthesia staff entered./None    Surgical Staff:  Circulator: Sierra Villafuerte R.N.  Scrub Person: Marjorie Samson    Specimens removed if any:  * No specimens in log *    Estimated Blood Loss: none    Findings: healing appropriately     Complications: none        2/18/2021 2:42 PM Joao Luque M.D.

## 2021-02-18 NOTE — OR NURSING
1445-Pt arrived from OR. Report received.Pt meets phase two criteria.    1510- Imitrex given.    1520- Pt discharged. All personal belongings with pt.

## 2021-02-18 NOTE — OP REPORT
DATE OF SERVICE:  02/18/2021     PREOPERATIVE DIAGNOSES:  1.  Chronic sinusitis with polyps.  2.  Aspirin-exacerbated respiratory disease.     POSTOPERATIVE DIAGNOSES:  1.  Chronic sinusitis with polyps.  2.  Aspirin-exacerbated respiratory disease.     PROCEDURE:  Bilateral endoscopic sinus debridement.     SURGEON:  Joao Luque MD     ASSISTANT:  None.     ANESTHESIA:  None.     COMPLICATIONS:  None.     ESTIMATED BLOOD LOSS:  None.     INDICATIONS:  This is a 35-year-old female who has aspirin-exacerbated   respiratory disease.  She underwent sinus surgery last week with me for her   polyps.  She had a preoperative headache that has continued.  She does have   migraines.  She is having some congestion.  Her smell is decreased.  Her   pathology showed chronic sinusitis.  After discussing the risks, benefits and   alternatives, she elected to undergo the above procedure.     DESCRIPTION OF PROCEDURE:  The patient was brought to the operating room on a   gurney.  She was draped in sterile fashion.  A timeout was performed.  A   0-degree endoscope was used to visualize the nasal cavity.  A 4% lidocaine and   Afrin pledgets were placed in the nose.  The septum was deviated to the right   mildly.  There was some debris that was removed and then debris was removed   from the ethmoid and sphenoids,  appeared to be healing appropriately.  On the   left hand side, old blood and crusting and mucus and debris were suctioned   from the ethmoid, maxillary and sphenoid, appeared to be healing   appropriately.  A 30-degree endoscope was used to visualize the frontals.    There were Propel contours that were sitting in appropriate position with the   frontals widely patent.  The patient tolerated the procedure well.        ______________________________  MD TYSON Taylor/MARLYN    DD:  02/18/2021 14:44  DT:  02/18/2021 15:26    Job#:  [id number]

## 2021-07-28 ENCOUNTER — HOSPITAL ENCOUNTER (OUTPATIENT)
Dept: RADIOLOGY | Facility: MEDICAL CENTER | Age: 36
End: 2021-07-28
Payer: COMMERCIAL

## 2021-07-30 ENCOUNTER — HOSPITAL ENCOUNTER (OUTPATIENT)
Dept: RADIOLOGY | Facility: MEDICAL CENTER | Age: 36
End: 2021-07-30
Attending: OBSTETRICS & GYNECOLOGY
Payer: COMMERCIAL

## 2021-07-30 DIAGNOSIS — N63.15 BREAST LUMP ON RIGHT SIDE AT 6 O'CLOCK POSITION: ICD-10-CM

## 2021-07-30 PROCEDURE — 76642 ULTRASOUND BREAST LIMITED: CPT | Mod: RT

## 2021-07-30 PROCEDURE — G0279 TOMOSYNTHESIS, MAMMO: HCPCS

## 2021-08-06 ENCOUNTER — OFFICE VISIT (OUTPATIENT)
Dept: NEUROLOGY | Facility: MEDICAL CENTER | Age: 36
End: 2021-08-06
Attending: PSYCHIATRY & NEUROLOGY
Payer: COMMERCIAL

## 2021-08-06 VITALS
OXYGEN SATURATION: 94 % | BODY MASS INDEX: 47.18 KG/M2 | SYSTOLIC BLOOD PRESSURE: 124 MMHG | WEIGHT: 240.3 LBS | HEIGHT: 60 IN | DIASTOLIC BLOOD PRESSURE: 86 MMHG | HEART RATE: 97 BPM

## 2021-08-06 DIAGNOSIS — G43.109 MIGRAINE WITH AURA AND WITHOUT STATUS MIGRAINOSUS, NOT INTRACTABLE: Primary | ICD-10-CM

## 2021-08-06 PROCEDURE — 99204 OFFICE O/P NEW MOD 45 MIN: CPT | Performed by: PSYCHIATRY & NEUROLOGY

## 2021-08-06 PROCEDURE — 99211 OFF/OP EST MAY X REQ PHY/QHP: CPT | Performed by: PSYCHIATRY & NEUROLOGY

## 2021-08-06 RX ORDER — AMITRIPTYLINE HYDROCHLORIDE 10 MG/1
TABLET, FILM COATED ORAL
Qty: 90 TABLET | Refills: 0 | Status: SHIPPED | OUTPATIENT
Start: 2021-08-06 | End: 2021-09-07

## 2021-08-06 RX ORDER — SUMATRIPTAN 50 MG/1
TABLET, FILM COATED ORAL
Qty: 11 TABLET | Refills: 3 | Status: SHIPPED | OUTPATIENT
Start: 2021-08-06 | End: 2021-10-19

## 2021-08-06 RX ORDER — MONTELUKAST SODIUM 10 MG/1
10 TABLET ORAL DAILY
COMMUNITY
Start: 2021-02-12

## 2021-08-06 NOTE — PROGRESS NOTES
"[facility] NEUROLOGY  GENERAL NEUROLOGY  NEW PATIENT VISIT    Referral source: none    CC: \"frequent headaches\"    HISTORY OF ILLNESS:  Guilherme Flaherty is a 36 y.o. woman with a history most notable for insomnia, migraine, and depression.  Today, she was unaccomapne, and she provided the following history:    The following is a summary of headache symptoms, presented in my standard format:    Family History: mother, aunts, sisters, grandfather all have migraines  Age at onset: 9 years old  Location: last year: bi-occipital, worse on the right  Radiation: last year: into the right eye  Frequency: migraines: 2-3/month, baseline headache: daily  Duration: all day, fades around 15:00-16:00  Headache Days/Month: 30/30  Quality: variable: \"pounding, sharp\"  Intensity: 10/10  Aura: visual (spots)  Photophobia/Phonophobia/Nausea/Vomiting: yes/yes/yes/yes  Provoked by Physical Activity?:   Triggers: none identified  Associated Symptoms:   Autonomic Signs (such as ptosis, miosis, conjunctival injection, rhinorrhea, increased lacrimation):   Head Trauma:   Association with Menses: none, s/p hysterectomy in 2008  ED Visits:   Hospitalizations:   Missed Work Days (in-home ): none  Sleep: 6-8, interrupted  Caffeine Intake: 1 coffee/morning, sometimes tea in the afternoon  Hydration: keeps well hydrated  Nutrition: skips meals sometimes  Exercise:   Analgesic Overuse:     Current Medication Regimen:  - sumatriptan: 50 is helpful if she can go to bed    Medications Tried: Response  Preventive:  - topiramate: 50 mg caused side effects (falls, dropping objects)    Abortive:  -     Medications Not Tried:  - propranolol: will avoid due to presence of asthma    2014:  Guilherme underwent sinus surgery.    2/2021:  She underwent a second sinus surgery.    MEDICAL AND SURGICAL HISTORY:  Past Medical History:   Diagnosis Date   • Abnormal weight gain    • Allergic rhinitis, cause unspecified    • Alopecia, unspecified    • " Asthma 02/04/2021    Inhaler use daily and PRN.   • Depressive disorder, not elsewhere classified    • Dermatitis with stretch marks opening   • Environmental allergies     Cats, live stock, dogs, hola brush etc...   • Family history of other chronic respiratory conditions    • Insomnia, unspecified    • Open wound of abdominal wall, anterior, without mention of complication    • Other atopic dermatitis and related conditions    • Other extrapyramidal disease and abnormal movement disorder    • Other malaise and fatigue    • Pap smear  10/2006   • Pneumococcal infection    • Pneumonia 2014   • Snoring    • Unspecified migraine      Past Surgical History:   Procedure Laterality Date   • SINUS WASHING Bilateral 2/18/2021    Procedure: IRRIGATION, PARANASAL SINUS - INTRAOP POSTOP SINUS DEBRIDEMENT;  Surgeon: Joao Lquue M.D.;  Location: SURGERY SAME DAY [facility];  Service: Ent   • PB REPAIR OF NASAL SEPTUM Bilateral 2/11/2021    Procedure: SEPTOPLASTY, NOSE;  Surgeon: Joao Luque M.D.;  Location: SURGERY SAME DAY [facility];  Service: Ent   • STEREOTACTIC COMPUTER ASSISTED PROCEDURE CRANIAL, EXTRADURAL Bilateral 2/11/2021    Procedure: STEREOTACTIC COMPUTER ASSISTED PROCEDURE CRANIAL,EXTRADURAL-NAVIGATIONAL;  Surgeon: Joao Luque M.D.;  Location: SURGERY SAME DAY [facility];  Service: Ent   • ENDOSCOPY, PARANASAL SINUS, WITH TOTAL ETHMOIDECTOMY, SN Bilateral 2/11/2021    Procedure: ENDOSCOPY, PARANASAL SINUS, WITH TOTAL ETHMOIDECTOMY, SPHENOIDOTOMY, MAXILLARY ANTROSTOMY, SPHEN+MAX SIN TISS REM, AND EXPLORATION FRONT SIN;  Surgeon: Joao Luque M.D.;  Location: SURGERY SAME DAY [facility];  Service: Ent   • OTHER  06/2014    Sinus   • ABDOMINOPLASTY  8/10/2012    Performed by SRINIVAS GOMEZ at SURGERY [facility] ORS   • LIPOSUCTION  8/10/2012    Performed by SRINIVAS GOMEZ at SURGERY [facility] ORS   • LESION EXCISION GENERAL  8/10/2012    Performed by SRINIVAS GOMEZ at SURGERY [facility] ORS    • OTHER ABDOMINAL SURGERY  2012    Tummy tuck.   • ABDOMINAL HYSTERECTOMY TOTAL  08    Performed by EMILIE CASTRO at SURGERY SAME DAY [facility] ORS   • GYN SURGERY  2008    Parial hysterectom   • GYN SURGERY  2004       • GYN SURGERY  2003       • GYN SURGERY  2000       • PRIMARY C SECTION  x 3   • TUBAL COAGULATION LAPAROSCOPIC BILATERAL       MEDICATIONS:  Current Outpatient Medications   Medication Sig   • aspirin 120 MG suppository    • montelukast (SINGULAIR) 10 MG Tab    • amitriptyline (ELAVIL) 10 MG Tab Take 1 tablet by mouth every evening for 30 days, THEN 2 Tablets every evening for 30 days.   • SUMAtriptan (IMITREX) 50 MG Tab Take  mg at the onset of aura/HA; may re-dose x1 after 2 hrs if HA persists; MDD: 200 mg; do not use >2 days/week.   • Fluticasone Furoate-Vilanterol (BREO ELLIPTA) 200-25 MCG/INH AEROSOL POWDER, BREATH ACTIVATED Inhale 1 Puff every day.   • valACYclovir (VALTREX) 500 MG Tab Take 500 mg by mouth every day.   • albuterol 108 (90 Base) MCG/ACT Aero Soln inhalation aerosol Inhale 2 Puffs every 6 hours as needed for Shortness of Breath.   • fexofenadine (ALLEGRA) 60 MG TABS Take 180 mg by mouth 2 times a day.     SOCIAL HISTORY:  Social History     Tobacco Use   • Smoking status: Never Smoker   • Smokeless tobacco: Never Used   Substance Use Topics   • Alcohol use: Yes     Comment: occasional     Social History     Social History Narrative   • Not on file     FAMILY HISTORY:  Family History   Problem Relation Age of Onset   • Other Mother         medical drug toxicity: warfarin, methadone   • Hypertension Father         stronf family hx. on dad's side of HTN   • Cancer Father         stomach     REVIEW OF SYSTEMS:  A ROS was completed.  Pertinent positives and negatives were included in the HPI, above.  All other systems were reviewed and are negative.    PHYSICAL EXAM:  General/Medical:  - NAD  - obese  - hair, skin, nails, and joints  were normal    Neuro:  MENTAL STATUS: awake and alert; no deficits of speech or language; oriented to person, place, and time; affect was appropriate to situation; pleasant, cooperative    CRANIAL NERVES:    II: acuity: J1+/J1+, fields: intact to confrontation, pupils: 3/3 to 2/2 without a relative afferent pupillary defect, discs: sharp    III/IV/VI: versions: intact without nystagmus    V: facial sensation: symmetric to light touch    VII: facial expression: symmetric    VIII: hearing: intact to finger rub    IX/X: palate: elevates symmetrically    XI: shoulder shrug: symmetric    XII: tongue: midline    MOTOR:  - bulk: normal throughout  - tone: normal throughout  Upper Extremity Strength  (R/L)    5/5   Elbow flexion 5/5   Elbow extension 5/5   Shoulder abduction 5/5     Lower Extremity Strength  (R/L)   Hip flexion 5/5   Knee extension 5/5   Knee flexion 5/5   Ankle plantarflexion 5/5   Ankle dorsiflexion 5/5     - can walk on toes and heels  - pronator drift: absent  - abnormal movements: none    SENSATION:  - light touch: grossly intact over the upper and lower   - vibration (R/L, seconds): NT at the great toes  - pinprick: NT  - proprioception: NT  - Romberg: absent    COORDINATION:  - finger to nose: normal, no ataxia on exam  - finger tapping: rapid and accurate, bilaterally    REFLEXES:  Reflex Right Left   BR 2+ 2+   Biceps 2+ 2+   Triceps 2+ 2+   Patellae 2+ 2+   Achilles 1+ 1+   Toes NT NT     GAIT:  - narrow base and normal  - heel-raised/toe-raised gait: intact/intact  - tandem gait: intact    REVIEW OF IMAGING STUDIES:  No recent brain imaging available.    REVIEW OF LABORATORY STUDIES:  No recent data available for review.    ASSESSMENT:  Guilherme Flaherty is a 36 y.o. woman with migraine with aura.  Plans/recommendations as follows:    PLAN:  Migraine w/ Aura:  Prevention:  - start amitriptyline 10 mg nightly, after 4 weeks increase to 20 mg nightly if tolerated  - get 7-9 hours of sleep  per night  - drink plenty of fluids (urine should be nearly clear)  - avoid excessive caffeine intake (no more than 2 servings per day and nothing in the afternoon)  - eat regular meals (don't skip meals)  - get moderate exercise (even just a 20 minute walk daily)    :  - sumatriptan 50 mg: take this at the onset of headache pain; may re-dose x1 after 2 hours if headache persists; if you find you often have to re-dose after 2 hours, simply take 100 mg at the onset of headache pain; do not use more than 2 days/week  - do not use analgesics (e.g., ibuprofen, acetaminophen) more than 2 days per week in order to avoid analgesic rebound headaches    - keep a headache log    Follow-Up:  - Return in about 2 months (around 10/6/2021).    Signed: hPil Stinson M.D.

## 2021-09-06 ENCOUNTER — HOSPITAL ENCOUNTER (OUTPATIENT)
Facility: MEDICAL CENTER | Age: 36
End: 2021-09-07
Attending: STUDENT IN AN ORGANIZED HEALTH CARE EDUCATION/TRAINING PROGRAM | Admitting: STUDENT IN AN ORGANIZED HEALTH CARE EDUCATION/TRAINING PROGRAM
Payer: COMMERCIAL

## 2021-09-06 ENCOUNTER — APPOINTMENT (OUTPATIENT)
Dept: RADIOLOGY | Facility: MEDICAL CENTER | Age: 36
End: 2021-09-06
Attending: STUDENT IN AN ORGANIZED HEALTH CARE EDUCATION/TRAINING PROGRAM
Payer: COMMERCIAL

## 2021-09-06 DIAGNOSIS — U07.1 PNEUMONIA DUE TO COVID-19 VIRUS: ICD-10-CM

## 2021-09-06 DIAGNOSIS — R09.02 HYPOXEMIA: ICD-10-CM

## 2021-09-06 DIAGNOSIS — J12.82 PNEUMONIA DUE TO COVID-19 VIRUS: ICD-10-CM

## 2021-09-06 LAB
ALBUMIN SERPL BCP-MCNC: 4 G/DL (ref 3.2–4.9)
ALBUMIN/GLOB SERPL: 1.3 G/DL
ALP SERPL-CCNC: 57 U/L (ref 30–99)
ALT SERPL-CCNC: 29 U/L (ref 2–50)
ANION GAP SERPL CALC-SCNC: 12 MMOL/L (ref 7–16)
AST SERPL-CCNC: 32 U/L (ref 12–45)
BASOPHILS # BLD AUTO: 0.4 % (ref 0–1.8)
BASOPHILS # BLD: 0.02 K/UL (ref 0–0.12)
BILIRUB SERPL-MCNC: 0.2 MG/DL (ref 0.1–1.5)
BUN SERPL-MCNC: 8 MG/DL (ref 8–22)
CALCIUM SERPL-MCNC: 8.6 MG/DL (ref 8.5–10.5)
CHLORIDE SERPL-SCNC: 100 MMOL/L (ref 96–112)
CO2 SERPL-SCNC: 23 MMOL/L (ref 20–33)
CREAT SERPL-MCNC: 0.61 MG/DL (ref 0.5–1.4)
EOSINOPHIL # BLD AUTO: 0 K/UL (ref 0–0.51)
EOSINOPHIL NFR BLD: 0 % (ref 0–6.9)
ERYTHROCYTE [DISTWIDTH] IN BLOOD BY AUTOMATED COUNT: 44.8 FL (ref 35.9–50)
GLOBULIN SER CALC-MCNC: 3.2 G/DL (ref 1.9–3.5)
GLUCOSE SERPL-MCNC: 103 MG/DL (ref 65–99)
HCT VFR BLD AUTO: 46 % (ref 37–47)
HGB BLD-MCNC: 15.3 G/DL (ref 12–16)
IMM GRANULOCYTES # BLD AUTO: 0.02 K/UL (ref 0–0.11)
IMM GRANULOCYTES NFR BLD AUTO: 0.4 % (ref 0–0.9)
LYMPHOCYTES # BLD AUTO: 0.95 K/UL (ref 1–4.8)
LYMPHOCYTES NFR BLD: 17 % (ref 22–41)
MCH RBC QN AUTO: 30.4 PG (ref 27–33)
MCHC RBC AUTO-ENTMCNC: 33.3 G/DL (ref 33.6–35)
MCV RBC AUTO: 91.5 FL (ref 81.4–97.8)
MONOCYTES # BLD AUTO: 0.25 K/UL (ref 0–0.85)
MONOCYTES NFR BLD AUTO: 4.5 % (ref 0–13.4)
NEUTROPHILS # BLD AUTO: 4.36 K/UL (ref 2–7.15)
NEUTROPHILS NFR BLD: 77.7 % (ref 44–72)
NRBC # BLD AUTO: 0 K/UL
NRBC BLD-RTO: 0 /100 WBC
PLATELET # BLD AUTO: 213 K/UL (ref 164–446)
PMV BLD AUTO: 9.8 FL (ref 9–12.9)
POTASSIUM SERPL-SCNC: 4 MMOL/L (ref 3.6–5.5)
PROT SERPL-MCNC: 7.2 G/DL (ref 6–8.2)
RBC # BLD AUTO: 5.03 M/UL (ref 4.2–5.4)
SODIUM SERPL-SCNC: 135 MMOL/L (ref 135–145)
WBC # BLD AUTO: 5.6 K/UL (ref 4.8–10.8)

## 2021-09-06 PROCEDURE — 0241U HCHG SARS-COV-2 COVID-19 NFCT DS RESP RNA 4 TRGT MIC: CPT

## 2021-09-06 PROCEDURE — 93005 ELECTROCARDIOGRAM TRACING: CPT

## 2021-09-06 PROCEDURE — 85652 RBC SED RATE AUTOMATED: CPT

## 2021-09-06 PROCEDURE — 93005 ELECTROCARDIOGRAM TRACING: CPT | Performed by: STUDENT IN AN ORGANIZED HEALTH CARE EDUCATION/TRAINING PROGRAM

## 2021-09-06 PROCEDURE — C9803 HOPD COVID-19 SPEC COLLECT: HCPCS | Performed by: STUDENT IN AN ORGANIZED HEALTH CARE EDUCATION/TRAINING PROGRAM

## 2021-09-06 PROCEDURE — 71045 X-RAY EXAM CHEST 1 VIEW: CPT

## 2021-09-06 PROCEDURE — 84145 PROCALCITONIN (PCT): CPT

## 2021-09-06 PROCEDURE — 85025 COMPLETE CBC W/AUTO DIFF WBC: CPT

## 2021-09-06 PROCEDURE — 80053 COMPREHEN METABOLIC PANEL: CPT

## 2021-09-06 PROCEDURE — 83880 ASSAY OF NATRIURETIC PEPTIDE: CPT

## 2021-09-06 PROCEDURE — 86140 C-REACTIVE PROTEIN: CPT

## 2021-09-07 ENCOUNTER — TELEPHONE (OUTPATIENT)
Dept: OTHER | Facility: MEDICAL CENTER | Age: 36
End: 2021-09-07

## 2021-09-07 VITALS
OXYGEN SATURATION: 93 % | WEIGHT: 231.48 LBS | HEIGHT: 61 IN | BODY MASS INDEX: 43.7 KG/M2 | TEMPERATURE: 99.4 F | SYSTOLIC BLOOD PRESSURE: 148 MMHG | HEART RATE: 99 BPM | DIASTOLIC BLOOD PRESSURE: 85 MMHG | RESPIRATION RATE: 20 BRPM

## 2021-09-07 VITALS — HEART RATE: 98 BPM | OXYGEN SATURATION: 90 % | RESPIRATION RATE: 28 BRPM

## 2021-09-07 PROBLEM — E66.01 MORBID OBESITY (HCC): Status: ACTIVE | Noted: 2021-09-07

## 2021-09-07 PROBLEM — U07.1 PNEUMONIA DUE TO COVID-19 VIRUS: Status: ACTIVE | Noted: 2021-09-07

## 2021-09-07 PROBLEM — J45.909 ASTHMA: Status: ACTIVE | Noted: 2021-09-07

## 2021-09-07 PROBLEM — J12.82 PNEUMONIA DUE TO COVID-19 VIRUS: Status: ACTIVE | Noted: 2021-09-07

## 2021-09-07 LAB
CRP SERPL HS-MCNC: 1.93 MG/DL (ref 0–0.75)
D DIMER PPP IA.FEU-MCNC: 0.28 UG/ML (FEU) (ref 0–0.5)
EKG IMPRESSION: NORMAL
ERYTHROCYTE [SEDIMENTATION RATE] IN BLOOD BY WESTERGREN METHOD: 17 MM/HOUR (ref 0–25)
FLUAV RNA SPEC QL NAA+PROBE: NEGATIVE
FLUBV RNA SPEC QL NAA+PROBE: NEGATIVE
NT-PROBNP SERPL IA-MCNC: 14 PG/ML (ref 0–125)
PROCALCITONIN SERPL-MCNC: <0.05 NG/ML
RSV RNA SPEC QL NAA+PROBE: NEGATIVE
SARS-COV-2 RNA RESP QL NAA+PROBE: DETECTED
SPECIMEN SOURCE: ABNORMAL

## 2021-09-07 PROCEDURE — 700102 HCHG RX REV CODE 250 W/ 637 OVERRIDE(OP): Performed by: HOSPITALIST

## 2021-09-07 PROCEDURE — 96372 THER/PROPH/DIAG INJ SC/IM: CPT

## 2021-09-07 PROCEDURE — 700111 HCHG RX REV CODE 636 W/ 250 OVERRIDE (IP): Performed by: STUDENT IN AN ORGANIZED HEALTH CARE EDUCATION/TRAINING PROGRAM

## 2021-09-07 PROCEDURE — 99453 REM MNTR PHYSIOL PARAM SETUP: CPT

## 2021-09-07 PROCEDURE — A9270 NON-COVERED ITEM OR SERVICE: HCPCS | Performed by: STUDENT IN AN ORGANIZED HEALTH CARE EDUCATION/TRAINING PROGRAM

## 2021-09-07 PROCEDURE — G0378 HOSPITAL OBSERVATION PER HR: HCPCS

## 2021-09-07 PROCEDURE — A9270 NON-COVERED ITEM OR SERVICE: HCPCS | Performed by: HOSPITALIST

## 2021-09-07 PROCEDURE — 85379 FIBRIN DEGRADATION QUANT: CPT

## 2021-09-07 PROCEDURE — 700102 HCHG RX REV CODE 250 W/ 637 OVERRIDE(OP): Performed by: STUDENT IN AN ORGANIZED HEALTH CARE EDUCATION/TRAINING PROGRAM

## 2021-09-07 PROCEDURE — 700105 HCHG RX REV CODE 258: Performed by: STUDENT IN AN ORGANIZED HEALTH CARE EDUCATION/TRAINING PROGRAM

## 2021-09-07 PROCEDURE — 99285 EMERGENCY DEPT VISIT HI MDM: CPT

## 2021-09-07 PROCEDURE — 96374 THER/PROPH/DIAG INJ IV PUSH: CPT

## 2021-09-07 PROCEDURE — 99454 REM MNTR PHYSIOL PARAM 16-30: CPT

## 2021-09-07 PROCEDURE — 99220 PR INITIAL OBSERVATION CARE,LEVL III: CPT | Mod: CS | Performed by: STUDENT IN AN ORGANIZED HEALTH CARE EDUCATION/TRAINING PROGRAM

## 2021-09-07 RX ORDER — DEXAMETHASONE SODIUM PHOSPHATE 10 MG/ML
6 INJECTION, SOLUTION INTRAMUSCULAR; INTRAVENOUS DAILY
Status: DISCONTINUED | OUTPATIENT
Start: 2021-09-07 | End: 2021-09-07 | Stop reason: HOSPADM

## 2021-09-07 RX ORDER — ASPIRIN 325 MG
325 TABLET ORAL 2 TIMES DAILY
COMMUNITY

## 2021-09-07 RX ORDER — AMITRIPTYLINE HYDROCHLORIDE 10 MG/1
10 TABLET, FILM COATED ORAL EVERY EVENING
Status: DISCONTINUED | OUTPATIENT
Start: 2021-09-07 | End: 2021-09-07 | Stop reason: HOSPADM

## 2021-09-07 RX ORDER — GUAIFENESIN 600 MG/1
1200 TABLET, EXTENDED RELEASE ORAL EVERY 12 HOURS
Status: DISCONTINUED | OUTPATIENT
Start: 2021-09-07 | End: 2021-09-07 | Stop reason: HOSPADM

## 2021-09-07 RX ORDER — ONDANSETRON 4 MG/1
4 TABLET, ORALLY DISINTEGRATING ORAL EVERY 6 HOURS PRN
Status: DISCONTINUED | OUTPATIENT
Start: 2021-09-07 | End: 2021-09-07 | Stop reason: HOSPADM

## 2021-09-07 RX ORDER — SODIUM CHLORIDE 9 MG/ML
INJECTION, SOLUTION INTRAVENOUS CONTINUOUS
Status: DISCONTINUED | OUTPATIENT
Start: 2021-09-07 | End: 2021-09-07 | Stop reason: HOSPADM

## 2021-09-07 RX ORDER — MONTELUKAST SODIUM 10 MG/1
10 TABLET ORAL DAILY
Status: DISCONTINUED | OUTPATIENT
Start: 2021-09-07 | End: 2021-09-07 | Stop reason: HOSPADM

## 2021-09-07 RX ORDER — AMITRIPTYLINE HYDROCHLORIDE 10 MG/1
20 TABLET, FILM COATED ORAL NIGHTLY
Status: SHIPPED | COMMUNITY
End: 2021-09-11

## 2021-09-07 RX ORDER — GUAIFENESIN 1200 MG/1
1200 TABLET, EXTENDED RELEASE ORAL EVERY 12 HOURS
Qty: 28 TABLET | Refills: 0 | Status: ON HOLD | OUTPATIENT
Start: 2021-09-07 | End: 2021-09-19

## 2021-09-07 RX ORDER — ONDANSETRON 2 MG/ML
4 INJECTION INTRAMUSCULAR; INTRAVENOUS EVERY 6 HOURS PRN
Status: DISCONTINUED | OUTPATIENT
Start: 2021-09-07 | End: 2021-09-07 | Stop reason: HOSPADM

## 2021-09-07 RX ORDER — DEXAMETHASONE 4 MG/1
6 TABLET ORAL ONCE
Status: DISCONTINUED | OUTPATIENT
Start: 2021-09-07 | End: 2021-09-07

## 2021-09-07 RX ORDER — DEXAMETHASONE SODIUM PHOSPHATE 10 MG/ML
6 INJECTION, SOLUTION INTRAMUSCULAR; INTRAVENOUS DAILY
Qty: 9 ML | Refills: 0 | Status: SHIPPED | OUTPATIENT
Start: 2021-09-08 | End: 2021-09-11

## 2021-09-07 RX ORDER — ACETAMINOPHEN 325 MG/1
650 TABLET ORAL ONCE
Status: COMPLETED | OUTPATIENT
Start: 2021-09-07 | End: 2021-09-07

## 2021-09-07 RX ADMIN — DEXAMETHASONE SODIUM PHOSPHATE 6 MG: 10 INJECTION INTRAMUSCULAR; INTRAVENOUS at 04:41

## 2021-09-07 RX ADMIN — AMITRIPTYLINE HYDROCHLORIDE 10 MG: 10 TABLET, FILM COATED ORAL at 19:34

## 2021-09-07 RX ADMIN — ACETAMINOPHEN 650 MG: 325 TABLET ORAL at 01:27

## 2021-09-07 RX ADMIN — GUAIFENESIN 1200 MG: 600 TABLET, EXTENDED RELEASE ORAL at 13:07

## 2021-09-07 RX ADMIN — ENOXAPARIN SODIUM 40 MG: 40 INJECTION SUBCUTANEOUS at 06:51

## 2021-09-07 RX ADMIN — SODIUM CHLORIDE: 9 INJECTION, SOLUTION INTRAVENOUS at 00:34

## 2021-09-07 RX ADMIN — MONTELUKAST 10 MG: 10 TABLET, FILM COATED ORAL at 19:34

## 2021-09-07 RX ADMIN — GUAIFENESIN 1200 MG: 600 TABLET, EXTENDED RELEASE ORAL at 19:34

## 2021-09-07 ASSESSMENT — ENCOUNTER SYMPTOMS
COUGH: 1
NEUROLOGICAL NEGATIVE: 1
PSYCHIATRIC NEGATIVE: 1
GASTROINTESTINAL NEGATIVE: 1
FEVER: 1
EYES NEGATIVE: 1
SHORTNESS OF BREATH: 1
CHILLS: 1
CARDIOVASCULAR NEGATIVE: 1
MUSCULOSKELETAL NEGATIVE: 1

## 2021-09-07 ASSESSMENT — LIFESTYLE VARIABLES
EVER HAD A DRINK FIRST THING IN THE MORNING TO STEADY YOUR NERVES TO GET RID OF A HANGOVER: NO
CONSUMPTION TOTAL: NEGATIVE
EVER FELT BAD OR GUILTY ABOUT YOUR DRINKING: NO
HAVE YOU EVER FELT YOU SHOULD CUT DOWN ON YOUR DRINKING: NO
HOW MANY TIMES IN THE PAST YEAR HAVE YOU HAD 5 OR MORE DRINKS IN A DAY: 0
AVERAGE NUMBER OF DAYS PER WEEK YOU HAVE A DRINK CONTAINING ALCOHOL: 0
TOTAL SCORE: 0
TOTAL SCORE: 0
ALCOHOL_USE: NO
HAVE PEOPLE ANNOYED YOU BY CRITICIZING YOUR DRINKING: NO
DOES PATIENT WANT TO STOP DRINKING: NO
ON A TYPICAL DAY WHEN YOU DRINK ALCOHOL HOW MANY DRINKS DO YOU HAVE: 0
TOTAL SCORE: 0

## 2021-09-07 ASSESSMENT — PAIN DESCRIPTION - PAIN TYPE: TYPE: ACUTE PAIN

## 2021-09-07 ASSESSMENT — PATIENT HEALTH QUESTIONNAIRE - PHQ9
SUM OF ALL RESPONSES TO PHQ9 QUESTIONS 1 AND 2: 0
1. LITTLE INTEREST OR PLEASURE IN DOING THINGS: NOT AT ALL
2. FEELING DOWN, DEPRESSED, IRRITABLE, OR HOPELESS: NOT AT ALL

## 2021-09-07 ASSESSMENT — FIBROSIS 4 INDEX: FIB4 SCORE: 1.03

## 2021-09-07 NOTE — ED NOTES
Spoke with case management, referral for home O2 being sent now. Hold in ED to see if pt will be d/c quickly. Charge nurse notified.

## 2021-09-07 NOTE — ED NOTES
Received report from TADEO Robbins. Assumed care of patient. Pt resting in bed, RR even and unlabored. On 2L NC currently. Call light in reach. Contacted hospitalist about possible d/c with home O2 program.

## 2021-09-07 NOTE — ED TRIAGE NOTES
Chief Complaint   Patient presents with   • Shortness of Breath     started Wednesday with a tight chest and headache. Patient developed fever 102F with cough and body aches. Patient used pulse oximetry at home and was at 86% on RA. Patient  vomited on Saturday.          Pt ambulated to triage for above complaint.  Pt is AO x 4, follows commands, and responds appropriately to questions. Patient's breathing is mild and pain is currently 8/10 in lower backon the 0-10 pain scale.  [facility] Patient educated on triage process and encouraged to alert staff for any changes.    Patient not vaccinated for COVID .

## 2021-09-07 NOTE — ED NOTES
Attending hospitalist today starting at 0700 is Dr Gaitan. Please contact this physician AFTER 0700 for questions orders, updates. Before this time, please contact the physician on call. Thank you.

## 2021-09-07 NOTE — TELEPHONE ENCOUNTER
[facility]           MR [id number] Guilherme tamayo    2 lpm  phone [phone] [id number] [facility] [address]

## 2021-09-07 NOTE — ASSESSMENT & PLAN NOTE
Admit patient to medical floor  Oxygen as needed, keep O2 > 90%  Decadron 6 mg for 10 days  ESR CRP Procalcitonin  Self prone encouraged  Lovenox ppx   COVID precautions

## 2021-09-07 NOTE — ED NOTES
Pt ambulatory to and from restroom without O2 in stable condition. oupon arrival back to room O2 was 89% on RA. Let O2 remain off. Lowest seen spO2 was 88%.   Reconnected to monitor.

## 2021-09-07 NOTE — ED NOTES
Late Entry: 0400. Oxygen turned off, spo2 ranged from 89-93% on room air. Patient encouraged to work on breathing, spo2 then remained 90-94% on room air. Patient then ambulatory around blue pod with spo2 ranging from 90-92% on room air with obvious tachypnea. 2 liters oxygen reapplied in room.

## 2021-09-07 NOTE — DISCHARGE PLANNING
note:  Received call from RN who said that pt will need RPM set up.   Pt has H insurance so the referral sent to [facility] with [facility]. RN already called COPD educator.

## 2021-09-07 NOTE — ED NOTES
Patient complained of having a hard time breathing, O2 was 88 on room air, put her on 2L and she was at 92 on 2L

## 2021-09-07 NOTE — ED PROVIDER NOTES
ED Provider Note    CHIEF COMPLAINT  Chief Complaint   Patient presents with   • Shortness of Breath     started Wednesday with a tight chest and headache. Patient developed fever 102F with cough and body aches. Patient used pulse oximetry at home and was at 86% on RA. Patient  vomited on Saturday.        HPI  Guilherme Flaherty is a 36 y.o. female with hx asthma, Samter's triad, obesity who presents with increasing shortness of breath and fatigue over the course of today.  Patient started with mild chest tightness on Wednesday which she thought was her asthma initially and states improved after albuterol.  Thursday she developed a headache and began to develop fevers over the weekend to 102.  Over the weekend and into today she has developed a cough and body aches as well.  She became more short of breath tonight and had increasing chest tightness and took her pulse ox at home and found it to be 86% which prompted her to come to the ED.  Patient has not had Covid vaccine in the past.  Denies any leg pain or swelling.  Denies personal history of blood clots in legs or lungs, but does report family history multiple clots in her family.  Denies known inherited clotting disorders.  Denies hemoptysis.    Not vaccinated against COVID.     REVIEW OF SYSTEMS  See HPI for further details. All other systems are negative.     PAST MEDICAL HISTORY   has a past medical history of Abnormal weight gain, Allergic rhinitis, cause unspecified, Alopecia, unspecified, Asthma (02/04/2021), Depressive disorder, not elsewhere classified, Dermatitis (with stretch marks opening), Environmental allergies, Family history of other chronic respiratory conditions, Insomnia, unspecified, Open wound of abdominal wall, anterior, without mention of complication, Other atopic dermatitis and related conditions, Other extrapyramidal disease and abnormal movement disorder, Other malaise and fatigue, Pap smear ( 10/2006), Pneumococcal infection,  Pneumonia (2014), Snoring, and Unspecified migraine.    SOCIAL HISTORY  Social History     Tobacco Use   • Smoking status: Never Smoker   • Smokeless tobacco: Never Used   Vaping Use   • Vaping Use: Never used   Substance and Sexual Activity   • Alcohol use: Yes     Comment: occasional   • Drug use: Never   • Sexual activity: Not on file       SURGICAL HISTORY   has a past surgical history that includes primary c section (x 3); tubal coagulation laparoscopic bilateral; abdominal hysterectomy total (8/29/08); abdominoplasty (8/10/2012); liposuction (8/10/2012); lesion excision general (8/10/2012); gyn surgery (08/2000); gyn surgery (07/2003); gyn surgery (07/2004); gyn surgery (08/2008); other abdominal surgery (08/2012); other (06/2014); repair of nasal septum (Bilateral, 2/11/2021); stereotactic computer assisted procedure cranial, extradural (Bilateral, 2/11/2021); endoscopy, paranasal sinus, with total ethmoidectomy, sn (Bilateral, 2/11/2021); and sinus washing (Bilateral, 2/18/2021).    CURRENT MEDICATIONS  Home Medications     Reviewed by Lexii Boo R.N. (Registered Nurse) on 09/06/21 at 1855  Med List Status: Partial   Medication Last Dose Status   albuterol 108 (90 Base) MCG/ACT Aero Soln inhalation aerosol  Active   amitriptyline (ELAVIL) 10 MG Tab  Active   aspirin 120 MG suppository  Active   fexofenadine (ALLEGRA) 60 MG TABS  Active   Fluticasone Furoate-Vilanterol (BREO ELLIPTA) 200-25 MCG/INH AEROSOL POWDER, BREATH ACTIVATED  Active   montelukast (SINGULAIR) 10 MG Tab  Active   SUMAtriptan (IMITREX) 50 MG Tab  Active   valACYclovir (VALTREX) 500 MG Tab  Active                ALLERGIES  Allergies   Allergen Reactions   • Aspirin      Throat closes, throat itches, runny nose    • Ibuprofen Rash and Anaphylaxis   • Ibuprofen      Throat closes, throat itches, runny nose    • Sulfa Drugs      Airway closes.   • Asa [Aspirin] Runny Nose   • Latex Rash   • Latex      Rash and itching   • Sulfa Drugs  "Shortness of Breath   • Tape      Blister at the site.       PHYSICAL EXAM  VITAL SIGNS: /78   Pulse 98   Temp 36.9 °C (98.4 °F) (Temporal)   Resp (!) 22   Ht 1.549 m (5' 1\")   Wt 105 kg (231 lb 14.8 oz)   SpO2 95%   BMI 43.82 kg/m²     Pulse ox interpretation: I interpret this pulse ox as abnormal (requiring 2L O2)  Constitutional: Alert in mild respiratory distress, obese  HENT: No signs of trauma, Bilateral external ears normal, Nose normal.  Nasal cannula oxygen in place  Eyes: Pupils are equal and reactive, Conjunctiva normal, Non-icteric.   Neck: Normal range of motion, No tenderness, Supple, No stridor.   Cardiovascular: Tachycardic in the 1 teens, regular rhythm, no murmurs.   Thorax & Lungs: No wheezing, decreased air movement bilateral bases, prefer sitting upright.  Speaking in short phrases.  Abdomen: Soft, No tenderness, No masses, No pulsatile masses. No peritoneal signs.  Skin: Warm, Dry, No erythema, No rash.   Extremities: Intact distal pulses, No edema, No tenderness, No cyanosis.  Musculoskeletal: Good range of motion in all major joints. No tenderness to palpation or major deformities noted.   Neurologic: Alert , Normal motor function, Normal sensory function, No focal deficits noted.   Psychiatric: Affect normal, Judgment normal, Mood normal.       DIAGNOSTIC STUDIES / PROCEDURES    LABS  Results for orders placed or performed during the hospital encounter of 09/06/21   CBC with Differential   Result Value Ref Range    WBC 5.6 4.8 - 10.8 K/uL    RBC 5.03 4.20 - 5.40 M/uL    Hemoglobin 15.3 12.0 - 16.0 g/dL    Hematocrit 46.0 37.0 - 47.0 %    MCV 91.5 81.4 - 97.8 fL    MCH 30.4 27.0 - 33.0 pg    MCHC 33.3 (L) 33.6 - 35.0 g/dL    RDW 44.8 35.9 - 50.0 fL    Platelet Count 213 164 - 446 K/uL    MPV 9.8 9.0 - 12.9 fL    Neutrophils-Polys 77.70 (H) 44.00 - 72.00 %    Lymphocytes 17.00 (L) 22.00 - 41.00 %    Monocytes 4.50 0.00 - 13.40 %    Eosinophils 0.00 0.00 - 6.90 %    Basophils 0.40 " 0.00 - 1.80 %    Immature Granulocytes 0.40 0.00 - 0.90 %    Nucleated RBC 0.00 /100 WBC    Neutrophils (Absolute) 4.36 2.00 - 7.15 K/uL    Lymphs (Absolute) 0.95 (L) 1.00 - 4.80 K/uL    Monos (Absolute) 0.25 0.00 - 0.85 K/uL    Eos (Absolute) 0.00 0.00 - 0.51 K/uL    Baso (Absolute) 0.02 0.00 - 0.12 K/uL    Immature Granulocytes (abs) 0.02 0.00 - 0.11 K/uL    NRBC (Absolute) 0.00 K/uL   Comp Metabolic Panel   Result Value Ref Range    Sodium 135 135 - 145 mmol/L    Potassium 4.0 3.6 - 5.5 mmol/L    Chloride 100 96 - 112 mmol/L    Co2 23 20 - 33 mmol/L    Anion Gap 12.0 7.0 - 16.0    Glucose 103 (H) 65 - 99 mg/dL    Bun 8 8 - 22 mg/dL    Creatinine 0.61 0.50 - 1.40 mg/dL    Calcium 8.6 8.5 - 10.5 mg/dL    AST(SGOT) 32 12 - 45 U/L    ALT(SGPT) 29 2 - 50 U/L    Alkaline Phosphatase 57 30 - 99 U/L    Total Bilirubin 0.2 0.1 - 1.5 mg/dL    Albumin 4.0 3.2 - 4.9 g/dL    Total Protein 7.2 6.0 - 8.2 g/dL    Globulin 3.2 1.9 - 3.5 g/dL    A-G Ratio 1.3 g/dL   ESTIMATED GFR   Result Value Ref Range    GFR If African American >60 >60 mL/min/1.73 m 2    GFR If Non African American >60 >60 mL/min/1.73 m 2   COV-2, FLU A/B, AND RSV BY PCR (2-4 HOURS CEPHEID): Collect NP swab in VTM    Specimen: Respirate   Result Value Ref Range    Influenza virus A RNA Negative Negative    Influenza virus B, PCR Negative Negative    RSV, PCR Negative Negative    SARS-CoV-2 by PCR DETECTED (AA)     SARS-CoV-2 Source NP Swab    EKG (NOW)   Result Value Ref Range    Report       [facility] Emergency Dept.    Test Date:  2021  Pt Name:    STACY JIN              Department: ER  MRN:        [id number]                      Room:  Gender:     Female                       Technician: [id number]  :        1985                   Requested By:ER TRIAGE PROTOCOL  Order #:    [id number]                    Frank MD: Anai Nguyen    Measurements  Intervals                                Axis  Rate:       115                           P:          51  TN:         123                          QRS:        -16  QRSD:       86                           T:          30  QT:         325  QTc:        451    Interpretive Statements  Sinus tachycardia rate of 115, normal axis, normal intervals, no ST  elevations,  depressions, T wave depressions. no significant changes compared to prior EKG  apart from rate  Electronically Signed On 9-7-2021 3:34:16 PDT by Anai Nguyen           RADIOLOGY  DX-CHEST-PORTABLE (1 VIEW)   Final Result      Mild left basilar peripheral airspace process which could be typical or Covid pneumonia              COURSE & MEDICAL DECISION MAKING  Pertinent Labs & Imaging studies reviewed. (See chart for details)  3:37 AM Dr. Jernigan accepts for admission.    Patient presenting with classic Covid symptoms now with increasing shortness of breath and found to be hypoxic here.  Covid did return positive.  She required 2 to 3 L of oxygen to maintain sats greater than 94%.  Chest x-ray shows evidence of bilateral infiltrates.  Patient started on steroids.  She does have a history of asthma, but no significant wheezing was appreciated on exam.  No additional risk factors for PE, feel presentation at this time is consistent with Covid pneumonia and no further work-up for PE needed currently.  EKG shows no signs of ischemia or pericarditis.  Admitted to hospitalist.      FINAL IMPRESSION  1. Pneumonia due to COVID-19 virus Acute    2. Hypoxemia Acute          Electronically signed by: Anai Nguyen M.D., 9/6/2021 11:52 PM

## 2021-09-07 NOTE — H&P
Hospital Medicine History & Physical Note    Date of Service  9/7/2021    Primary Care Physician  Pcp Pt States None    Consultants  None    Code Status  Full Code    Chief Complaint  Chief Complaint   Patient presents with   • Shortness of Breath     started Wednesday with a tight chest and headache. Patient developed fever 102F with cough and body aches. Patient used pulse oximetry at home and was at 86% on RA. Patient  vomited on Saturday.        History of Presenting Illness  Guilherme Flaherty is a 36 y.o. female who presented 9/6/2021 with Covid-like symptoms for 5 days.  Patient is not vaccinated.  She states that she has had body pain headache shortness of breath fever chills generalized weakness for several days progressively getting worse.  She tried to take her asthma inhaler today without any relief.    Denies drinking smoking illicit drug use    In ED, patient found to be tachycardic hypoxic and tachypneic.  Diagnosed with Covid in ED.    I discussed the plan of care with patient.    Review of Systems  Review of Systems   Constitutional: Positive for chills, fever and malaise/fatigue.   HENT: Negative.    Eyes: Negative.    Respiratory: Positive for cough and shortness of breath.    Cardiovascular: Negative.    Gastrointestinal: Negative.    Genitourinary: Negative.    Musculoskeletal: Negative.    Skin: Negative.    Neurological: Negative.    Endo/Heme/Allergies: Negative.    Psychiatric/Behavioral: Negative.        Past Medical History   has a past medical history of Abnormal weight gain, Allergic rhinitis, cause unspecified, Alopecia, unspecified, Asthma (02/04/2021), Depressive disorder, not elsewhere classified, Dermatitis (with stretch marks opening), Environmental allergies, Family history of other chronic respiratory conditions, Insomnia, unspecified, Open wound of abdominal wall, anterior, without mention of complication, Other atopic dermatitis and related conditions, Other extrapyramidal  disease and abnormal movement disorder, Other malaise and fatigue, Pap smear ( 10/2006), Pneumococcal infection, Pneumonia (2014), Snoring, and Unspecified migraine.    Surgical History   has a past surgical history that includes primary c section (x 3); tubal coagulation laparoscopic bilateral; abdominal hysterectomy total (8/29/08); abdominoplasty (8/10/2012); liposuction (8/10/2012); lesion excision general (8/10/2012); gyn surgery (08/2000); gyn surgery (07/2003); gyn surgery (07/2004); gyn surgery (08/2008); other abdominal surgery (08/2012); other (06/2014); pr repair of nasal septum (Bilateral, 2/11/2021); stereotactic computer assisted procedure cranial, extradural (Bilateral, 2/11/2021); endoscopy, paranasal sinus, with total ethmoidectomy, sn (Bilateral, 2/11/2021); and sinus washing (Bilateral, 2/18/2021).     Family History  family history includes Cancer in her father; Hypertension in her father; Other in her mother.   Family history reviewed with patient. There is no family history that is pertinent to the chief complaint.     Social History   reports that she has never smoked. She has never used smokeless tobacco. She reports current alcohol use. She reports that she does not use drugs.    Allergies  Allergies   Allergen Reactions   • Aspirin      Throat closes, throat itches, runny nose    • Ibuprofen Rash and Anaphylaxis   • Ibuprofen      Throat closes, throat itches, runny nose    • Sulfa Drugs      Airway closes.   • Asa [Aspirin] Runny Nose   • Latex Rash   • Latex      Rash and itching   • Sulfa Drugs Shortness of Breath   • Tape      Blister at the site.       Medications  Prior to Admission Medications   Prescriptions Last Dose Informant Patient Reported? Taking?   Fluticasone Furoate-Vilanterol (BREO ELLIPTA) 200-25 MCG/INH AEROSOL POWDER, BREATH ACTIVATED  Patient Yes No   Sig: Inhale 1 Puff every day.   SUMAtriptan (IMITREX) 50 MG Tab   No No   Sig: Take  mg at the onset of aura/HA;  may re-dose x1 after 2 hrs if HA persists; MDD: 200 mg; do not use >2 days/week.   albuterol 108 (90 Base) MCG/ACT Aero Soln inhalation aerosol  Patient Yes No   Sig: Inhale 2 Puffs every 6 hours as needed for Shortness of Breath.   amitriptyline (ELAVIL) 10 MG Tab   No No   Sig: Take 1 tablet by mouth every evening for 30 days, THEN 2 Tablets every evening for 30 days.   aspirin 120 MG suppository   Yes No   fexofenadine (ALLEGRA) 60 MG TABS   Yes No   Sig: Take 180 mg by mouth 2 times a day.   montelukast (SINGULAIR) 10 MG Tab   Yes No   valACYclovir (VALTREX) 500 MG Tab  Patient Yes No   Sig: Take 500 mg by mouth every day.      Facility-Administered Medications: None       Physical Exam  Temp:  [36.9 °C (98.4 °F)-37.5 °C (99.5 °F)] 36.9 °C (98.4 °F)  Pulse:  [] 98  Resp:  [16-22] 22  BP: (136-170)/() 136/78  SpO2:  [88 %-98 %] 95 %    Physical Exam  Constitutional:       Appearance: Normal appearance. She is obese.   HENT:      Head: Normocephalic.      Nose: Nose normal.      Mouth/Throat:      Mouth: Mucous membranes are moist.   Eyes:      Pupils: Pupils are equal, round, and reactive to light.   Cardiovascular:      Rate and Rhythm: Regular rhythm. Tachycardia present.      Pulses: Normal pulses.   Pulmonary:      Comments: Tachypneic, saturating 95% with NC  No wheezing appreciated on PE   Abdominal:      General: Abdomen is flat.      Palpations: Abdomen is soft.   Musculoskeletal:         General: No swelling. Normal range of motion.      Cervical back: Normal range of motion.   Skin:     General: Skin is warm.   Neurological:      General: No focal deficit present.      Mental Status: She is alert and oriented to person, place, and time. Mental status is at baseline.         Laboratory:  Recent Labs     09/06/21 2108   WBC 5.6   RBC 5.03   HEMOGLOBIN 15.3   HEMATOCRIT 46.0   MCV 91.5   MCH 30.4   MCHC 33.3*   RDW 44.8   PLATELETCT 213   MPV 9.8     Recent Labs     09/06/21 2108   SODIUM 135    POTASSIUM 4.0   CHLORIDE 100   CO2 23   GLUCOSE 103*   BUN 8   CREATININE 0.61   CALCIUM 8.6     Recent Labs     09/06/21  2108   ALTSGPT 29   ASTSGOT 32   ALKPHOSPHAT 57   TBILIRUBIN 0.2   GLUCOSE 103*         No results for input(s): NTPROBNP in the last 72 hours.      No results for input(s): TROPONINT in the last 72 hours.    Imaging:  DX-CHEST-PORTABLE (1 VIEW)   Final Result      Mild left basilar peripheral airspace process which could be typical or Covid pneumonia          X-Ray:  I have personally reviewed the images and compared with prior images.    Assessment/Plan:  I anticipate this patient is appropriate for observation status at this time.    * Pneumonia due to COVID-19 virus  Assessment & Plan  Admit patient to medical floor  Oxygen as needed, keep O2 > 90%  Decadron 6 mg for 10 days  ESR CRP Procalcitonin  Self prone encouraged  Lovenox ppx   COVID precautions      Asthma  Assessment & Plan  Albuterol as needed  Singulair 10 mg po daily     Morbid obesity (HCC)  Assessment & Plan  15 minutes spent counseling patient on weight loss, nutrition, and healthy lifestyle        VTE prophylaxis: enoxaparin ppx

## 2021-09-07 NOTE — DISCHARGE PLANNING
Received Choice form at 4839  Agency/Facility Name: [facility]  Referral sent per Choice form @ [id number]

## 2021-09-07 NOTE — DISCHARGE SUMMARY
Discharge Summary    CHIEF COMPLAINT ON ADMISSION  Chief Complaint   Patient presents with   • Shortness of Breath     started Wednesday with a tight chest and headache. Patient developed fever 102F with cough and body aches. Patient used pulse oximetry at home and was at 86% on RA. Patient  vomited on Saturday.        Reason for Admission  Shortness of Breath     Admission Date  9/6/2021    CODE STATUS  Full Code    HPI & HOSPITAL COURSE  This 36-year-old female with past medical history significant for asthma, obesity presented to the ER on 9/6/2021 with a complaint of shortness of breath.    She report that she had a mild chest tightness on 9/ 1, took inhaler and started feeling better.  Her SOB continue to get worse Thursday on 9/2 with fever, chills, hypoxia, chest tightness.  Stated albuterol without significant improvement thus decided to come to ER.    In ER she is found to be hypoxic, requiring 2 to 3 L of oxygen.    Upon presentation in ER noted to be tachycardic with a heart rate of 125.  CBC, CMP unremarkable chest x-ray showed mild left basilar peripheral airspace process which could be atypical COVID-19 pneumonia.  D-dimer 0.28, procalcitonin less than 0.05, CRP 1.93, ESR 17, COVID-19 is positive on 9/7/2021.    During the stay in the hospital, patient continues to be monitored, she was started on supportive treatment for COVID-19 including RT protocol, breathing treatment, Mucinex.  Patient is recommended to use incentive spirometer, mobilization at home and self proning, stated understanding.    Home O2 evaluation was obtained, patient will be discharged home on home oxygen along with remote oxygen monitoring.  At this time patient is medically stable to be discharged home.          Therefore, she is discharged in fair and stable condition to home with close outpatient follow-up.    The patient recovered much more quickly than anticipated on admission.    Discharge Date      FOLLOW UP ITEMS POST  DISCHARGE  Pcp Pt States None      DISCHARGE DIAGNOSES  Principal Problem:    Pneumonia due to COVID-19 virus POA: Unknown  Active Problems:    Morbid obesity (HCC) POA: Unknown    Asthma POA: Unknown  Resolved Problems:    * No resolved hospital problems. *      FOLLOW UP  Future Appointments   Date Time Provider Department Center   10/1/2021  7:20 AM KADEEM Long None     No follow-up provider specified.    MEDICATIONS ON DISCHARGE     Medication List      START taking these medications      Instructions   dexamethasone pf 10 MG/ML Soln  Start taking on: September 8, 2021  Commonly known as: DECADRON   Take 0.6 mL by mouth every day for 9 days.  Dose: 6 mg     Guaifenesin 1200 MG Tb12   Take 1 Tablet by mouth every 12 hours.  Dose: 1,200 mg        CONTINUE taking these medications      Instructions   albuterol 108 (90 Base) MCG/ACT Aers inhalation aerosol   Inhale 2 Puffs every 6 hours as needed for Shortness of Breath.  Dose: 2 Puff     amitriptyline 10 MG Tabs  Commonly known as: ELAVIL   Take 20 mg by mouth every evening.  Dose: 20 mg     aspirin 325 MG Tabs  Commonly known as: ASA   Take 325 mg by mouth 2 times a day.  Dose: 325 mg     Breo Ellipta 200-25 MCG/INH Aepb  Generic drug: Fluticasone Furoate-Vilanterol   Inhale 1 Puff every day.  Dose: 1 Puff     fexofenadine 60 MG Tabs  Commonly known as: ALLEGRA   Take 180 mg by mouth every day.  Dose: 180 mg     montelukast 10 MG Tabs  Commonly known as: SINGULAIR   Take 10 mg by mouth every day.  Dose: 10 mg     SUMAtriptan 50 MG Tabs  Commonly known as: IMITREX   Take  mg at the onset of aura/HA; may re-dose x1 after 2 hrs if HA persists; MDD: 200 mg; do not use >2 days/week.     valACYclovir 500 MG Tabs  Commonly known as: VALTREX   Take 500 mg by mouth every day.  Dose: 500 mg            Allergies  Allergies   Allergen Reactions   • Aspirin      Throat closes, throat itches, runny nose    • Ibuprofen Rash and Anaphylaxis   • Ibuprofen       Throat closes, throat itches, runny nose    • Sulfa Drugs      Airway closes.   • Asa [Aspirin] Runny Nose   • Latex Rash   • Latex      Rash and itching   • Sulfa Drugs Shortness of Breath   • Tape      Blister at the site.       DIET  No orders of the defined types were placed in this encounter.      ACTIVITY  As tolerated.  Weight bearing as tolerated    CONSULTATIONS  None    PROCEDURES  None    LABORATORY  Lab Results   Component Value Date    SODIUM 135 09/06/2021    POTASSIUM 4.0 09/06/2021    CHLORIDE 100 09/06/2021    CO2 23 09/06/2021    GLUCOSE 103 (H) 09/06/2021    BUN 8 09/06/2021    CREATININE 0.61 09/06/2021    CREATININE 0.7 10/01/2008        Lab Results   Component Value Date    WBC 5.6 09/06/2021    HEMOGLOBIN 15.3 09/06/2021    HEMATOCRIT 46.0 09/06/2021    PLATELETCT 213 09/06/2021        Total time of the discharge process exceeds 35min  I went to the bedside, evaluated the patient evaluated today including nursing note.  I discussed the plan of care with the patient and her  at bedside in detail.  At this time patient is medically stable to be discharged home with home oxygen and remote O2 monitoring

## 2021-09-07 NOTE — DISCHARGE INSTRUCTIONS
Discharge Instructions    Discharged to home by car with relative. Discharged via wheelchair, hospital escort: Yes.  Special equipment needed: Not Applicable    Be sure to schedule a follow-up appointment with your primary care doctor or any specialists as instructed.     Discharge Plan:   Diet Plan: Discussed  Activity Level: Discussed  Confirmed Follow up Appointment: Patient to Call and Schedule Appointment  Confirmed Symptoms Management: Discussed  Medication Reconciliation Updated: Yes    I understand that a diet low in cholesterol, fat, and sodium is recommended for good health. Unless I have been given specific instructions below for another diet, I accept this instruction as my diet prescription.   Other diet: regular    Special Instructions: None    · Is patient discharged on Warfarin / Coumadin?   No     Depression / Suicide Risk    As you are discharged from this [facility] facility, it is important to learn how to keep safe from harming yourself.    Recognize the warning signs:  · Abrupt changes in personality, positive or negative- including increase in energy   · Giving away possessions  · Change in eating patterns- significant weight changes-  positive or negative  · Change in sleeping patterns- unable to sleep or sleeping all the time   · Unwillingness or inability to communicate  · Depression  · Unusual sadness, discouragement and loneliness  · Talk of wanting to die  · Neglect of personal appearance   · Rebelliousness- reckless behavior  · Withdrawal from people/activities they love  · Confusion- inability to concentrate     If you or a loved one observes any of these behaviors or has concerns about self-harm, here's what you can do:  · Talk about it- your feelings and reasons for harming yourself  · Remove any means that you might use to hurt yourself (examples: pills, rope, extension cords, firearm)  · Get professional help from the community (Mental Health, Substance Abuse, psychological  counseling)  · Do not be alone:Call your Safe Contact- someone whom you trust who will be there for you.  · Call your local CRISIS HOTLINE 813-3046 or 727-727-8672  · Call your local Children's Mobile Crisis Response Team [place] (739) 853-6504 or www.[employer]  · Call the toll free National Suicide Prevention Hotlines   · National Suicide Prevention Lifeline 888-752-HMUR (1154)  · National Hope Line Network 800-SUICIDE (127-1186)    Discharge Instructions per Sukh Gaitan M.D.  DIET: Resume previous diet  Healthy diet. Plenty of vegetables.  ACTIVITY: Avoid strenuous activity or heavy lifting.   DIAGNOSIS:   Patient Active Problem List   Diagnosis   • Abnormal weight gain   • Hair loss   • Other extrapyramidal disease and abnormal movement disorder   • Dermatitis   • Allergic rhinitis   • Insomnia   • Family history of other chronic respiratory conditions   • Open wound of anterior abdominal wall   • Other malaise and fatigue   • Depressive disorder, not elsewhere classified   • Migraine   • Other atopic dermatitis and related conditions   • Pneumonia due to COVID-19 virus   • Morbid obesity (HCC)   • Asthma     Return to ER in the event of new or worsening symptoms. Please note importance of compliance and the patient has agreed to proceed with all medical recommendations and follow up plan indicated above. All medications come with benefits and risks. Risks may include permanent injury or death and these risks can be minimized with close reassessment and monitoring.     Please come to ER if you if your shortness of breath continues to get worse.  Please continue take Decadron 6 mg p.o. daily along with Mucinex.    COVID-19 Patient Discharge Instructions  POSITIVE COVID 19 TEST    1. Stay home and continue self isolation until:  · At least ten (10) days have passed since symptoms first appeared AND    · At least 24 hours have passed from last fever without the use of fever-reducing medications  AND    · Symptoms have improved (eg: cough or shortness of breath)    2. Even after the above are met, maintain social distance from others (at least 6 feet) and practice frequent hand washing.    3. Wear a face mask in public places.    4. NOTE: If you need to be seen at [facility]:    · At least fourteen (14) days must pass since symptoms appeared AND     · At least 24 hours have passed from last fever without the use of fever-reducing medications AND    · Symptoms have improved (eg: cough or shortness of breath)

## 2021-09-07 NOTE — DISCHARGE PLANNING
Agency/Facility Name: [facility]  Spoke To:   Outcome: Oxygen on its way now to be delivered, notified TADEO Han.

## 2021-09-07 NOTE — FACE TO FACE
"Face to Face Note  -  Durable Medical Equipment    Sukh Gaitan M.D. - NPI: [id number]  I certify that this patient is under my care and that they had a durable medical equipment(DME)face to face encounter by myself that meets the physician DME face-to-face encounter requirements with this patient on:    Date of encounter:   Patient:                    MRN:                       YOB: 2022  Guilherme Flaherty  [id number]  1985     The encounter with the patient was in whole, or in part, for the following medical condition, which is the primary reason for durable medical equipment:  Covid-19 Infection    I certify that, based on my findings, the following durable medical equipment is medically necessary:  Oxygen.    HOME O2 Saturation Measurements:(Values must be present for Home Oxygen orders)  Room air sat at rest: 88  Room air sat with amb: 88  With liters of O2: 2, O2 sat at rest with O2: 93  With Liters of O2: 2, O2 sat with amb with O2 : 95  Is the patient mobile?: Yes    My Clinical findings support the need for the above equipment due to:  Hypoxia    Supporting Symptoms: The patient requires supplemental oxygen, as the following interventions have been tried with limited or no improvement: \"Bronchodilators and/or steroid inhalers    If patient feels more short of breath, they can go up to 6 liters per minute and contact healthcare provider.  "

## 2021-09-07 NOTE — RESPIRATORY CARE
REMOTE MONITORING PROGRAM by RESPIRATORY THERAPY  9/7/2021 at 11:51 AM by Delmy Dennis, RRT     Patient interviewed by RT. Patient agrees to Remote Monitoring program. Device instruction performed with welcome packet, consent signed and placed at bedside chart. Patient instructed on how to use MyChart and Zoom. No questions at this time.     Flyer and education performed on remote monitoring with bedside RN complete.

## 2021-09-08 NOTE — PROGRESS NOTES
Iv has been removed, discharge paperwork has been provided and discussed, and questions and concerns have been discussed with this patient. Patient was escorted off the unit via wheelchair and taken home by family. Patient has been discharged.

## 2021-09-09 ENCOUNTER — TELEPHONE (OUTPATIENT)
Dept: OTHER | Facility: MEDICAL CENTER | Age: 36
End: 2021-09-09

## 2021-09-09 VITALS — OXYGEN SATURATION: 88 % | HEART RATE: 104 BPM | RESPIRATION RATE: 27 BRPM

## 2021-09-09 VITALS — OXYGEN SATURATION: 89 % | HEART RATE: 117 BPM | RESPIRATION RATE: 22 BRPM

## 2021-09-09 VITALS — OXYGEN SATURATION: 83 % | HEART RATE: 125 BPM | RESPIRATION RATE: 32 BRPM

## 2021-09-09 VITALS — OXYGEN SATURATION: 87 % | HEART RATE: 106 BPM | RESPIRATION RATE: 32 BRPM

## 2021-09-09 VITALS — HEART RATE: 94 BPM | RESPIRATION RATE: 36 BRPM | OXYGEN SATURATION: 86 %

## 2021-09-09 VITALS — HEART RATE: 66 BPM | OXYGEN SATURATION: 82 % | RESPIRATION RATE: 31 BRPM

## 2021-09-09 VITALS — RESPIRATION RATE: 31 BRPM | HEART RATE: 103 BPM | OXYGEN SATURATION: 87 %

## 2021-09-09 VITALS — HEART RATE: 111 BPM | RESPIRATION RATE: 32 BRPM | OXYGEN SATURATION: 88 %

## 2021-09-10 ENCOUNTER — TELEPHONE (OUTPATIENT)
Dept: OTHER | Facility: MEDICAL CENTER | Age: 36
End: 2021-09-10

## 2021-09-10 VITALS — RESPIRATION RATE: 42 BRPM | HEART RATE: 97 BPM | OXYGEN SATURATION: 86 %

## 2021-09-10 VITALS — OXYGEN SATURATION: 84 % | RESPIRATION RATE: 35 BRPM | HEART RATE: 100 BPM

## 2021-09-10 VITALS — RESPIRATION RATE: 36 BRPM | OXYGEN SATURATION: 85 % | HEART RATE: 100 BPM

## 2021-09-10 VITALS — OXYGEN SATURATION: 87 % | RESPIRATION RATE: 26 BRPM | HEART RATE: 84 BPM

## 2021-09-10 VITALS — RESPIRATION RATE: 38 BRPM | HEART RATE: 111 BPM | OXYGEN SATURATION: 86 %

## 2021-09-10 VITALS — OXYGEN SATURATION: 84 % | RESPIRATION RATE: 33 BRPM | HEART RATE: 105 BPM

## 2021-09-10 VITALS — HEART RATE: 97 BPM | RESPIRATION RATE: 35 BRPM | OXYGEN SATURATION: 81 %

## 2021-09-10 VITALS — RESPIRATION RATE: 30 BRPM | OXYGEN SATURATION: 81 % | HEART RATE: 114 BPM

## 2021-09-10 VITALS — HEART RATE: 84 BPM | RESPIRATION RATE: 28 BRPM | OXYGEN SATURATION: 86 %

## 2021-09-10 VITALS — OXYGEN SATURATION: 86 % | HEART RATE: 92 BPM | RESPIRATION RATE: 38 BRPM

## 2021-09-10 VITALS — HEART RATE: 85 BPM | OXYGEN SATURATION: 88 % | RESPIRATION RATE: 21 BRPM

## 2021-09-10 VITALS — RESPIRATION RATE: 25 BRPM | OXYGEN SATURATION: 87 % | HEART RATE: 82 BPM

## 2021-09-10 VITALS — RESPIRATION RATE: 30 BRPM | HEART RATE: 102 BPM | OXYGEN SATURATION: 87 %

## 2021-09-10 VITALS — OXYGEN SATURATION: 88 % | RESPIRATION RATE: 25 BRPM | HEART RATE: 82 BPM

## 2021-09-10 VITALS — OXYGEN SATURATION: 87 % | RESPIRATION RATE: 38 BRPM | HEART RATE: 93 BPM

## 2021-09-10 VITALS — OXYGEN SATURATION: 84 % | RESPIRATION RATE: 28 BRPM | HEART RATE: 84 BPM

## 2021-09-10 VITALS — OXYGEN SATURATION: 83 % | RESPIRATION RATE: 39 BRPM | HEART RATE: 96 BPM

## 2021-09-10 VITALS — OXYGEN SATURATION: 85 % | HEART RATE: 107 BPM | RESPIRATION RATE: 33 BRPM

## 2021-09-10 VITALS — HEART RATE: 86 BPM | OXYGEN SATURATION: 88 % | RESPIRATION RATE: 33 BRPM

## 2021-09-10 VITALS — OXYGEN SATURATION: 86 % | HEART RATE: 90 BPM | RESPIRATION RATE: 15 BRPM

## 2021-09-10 VITALS — HEART RATE: 95 BPM | RESPIRATION RATE: 14 BRPM | OXYGEN SATURATION: 85 %

## 2021-09-10 VITALS — HEART RATE: 98 BPM | RESPIRATION RATE: 29 BRPM | OXYGEN SATURATION: 86 %

## 2021-09-10 DIAGNOSIS — U07.1 COVID-19: ICD-10-CM

## 2021-09-10 NOTE — TELEPHONE ENCOUNTER
Pt alerted for low oxygen at 2030, 2040, 2045 . She stated that she was fine and would take is easy.

## 2021-09-10 NOTE — TELEPHONE ENCOUNTER
Pt alerted for low oxygen at 2055. She stated that she was fine, I advised her to increase oxygen to 2L.

## 2021-09-10 NOTE — TELEPHONE ENCOUNTER
Called patient she advised she is okay and was just coughing . ( in the call log flowsheet I hit the readmit button in error )

## 2021-09-10 NOTE — TELEPHONE ENCOUNTER
Called patient spoke with  and advised she is okay and switch the O2 mask to brush her teeth and wash her face she will go back to normal

## 2021-09-10 NOTE — TELEPHONE ENCOUNTER
Pt alerted for low oxygen at 0307. She stated that she felt fine. I advised her to turn her concentrator up from 3L to 3.5L.

## 2021-09-10 NOTE — TELEPHONE ENCOUNTER
Called patient she is still okay and trying to nap her  went to get an O2 mask to see if it will help her O2 when she is asleep

## 2021-09-10 NOTE — TELEPHONE ENCOUNTER
Pt alerted for low oxygen at 0245. She stated that she was fine and would readjust her sleeping position.

## 2021-09-10 NOTE — TELEPHONE ENCOUNTER
Called patient she advised she is okay and her  just got back with a face amsk for her O2 that just put it on

## 2021-09-10 NOTE — TELEPHONE ENCOUNTER
Pt alerted for low oxygen at 0524. She stated that she was ok. I advised patient gets a face mask for her concentrator due to her breathing through her mouth at night.

## 2021-09-11 ENCOUNTER — TELEPHONE (OUTPATIENT)
Dept: OTHER | Facility: MEDICAL CENTER | Age: 36
End: 2021-09-11

## 2021-09-11 ENCOUNTER — APPOINTMENT (OUTPATIENT)
Dept: RADIOLOGY | Facility: MEDICAL CENTER | Age: 36
DRG: 177 | End: 2021-09-11
Attending: EMERGENCY MEDICINE
Payer: COMMERCIAL

## 2021-09-11 ENCOUNTER — HOSPITAL ENCOUNTER (INPATIENT)
Facility: MEDICAL CENTER | Age: 36
LOS: 8 days | DRG: 177 | End: 2021-09-19
Attending: EMERGENCY MEDICINE | Admitting: HOSPITALIST
Payer: COMMERCIAL

## 2021-09-11 VITALS — HEART RATE: 85 BPM | RESPIRATION RATE: 30 BRPM | OXYGEN SATURATION: 87 %

## 2021-09-11 VITALS — HEART RATE: 92 BPM | RESPIRATION RATE: 32 BRPM | OXYGEN SATURATION: 86 %

## 2021-09-11 VITALS — OXYGEN SATURATION: 88 % | RESPIRATION RATE: 40 BRPM | HEART RATE: 89 BPM

## 2021-09-11 VITALS — OXYGEN SATURATION: 88 % | HEART RATE: 83 BPM | RESPIRATION RATE: 32 BRPM

## 2021-09-11 VITALS — OXYGEN SATURATION: 90 % | RESPIRATION RATE: 32 BRPM | HEART RATE: 90 BPM

## 2021-09-11 VITALS — RESPIRATION RATE: 31 BRPM | HEART RATE: 103 BPM | OXYGEN SATURATION: 82 %

## 2021-09-11 DIAGNOSIS — U07.1 COVID-19: ICD-10-CM

## 2021-09-11 DIAGNOSIS — E87.6 HYPOKALEMIA: ICD-10-CM

## 2021-09-11 DIAGNOSIS — J96.01 ACUTE RESPIRATORY FAILURE WITH HYPOXEMIA (HCC): ICD-10-CM

## 2021-09-11 PROBLEM — J45.909 SAMTER'S TRIAD: Status: ACTIVE | Noted: 2021-09-11

## 2021-09-11 PROBLEM — G43.909 MIGRAINES: Status: ACTIVE | Noted: 2021-09-11

## 2021-09-11 PROBLEM — Z88.6 SAMTER'S TRIAD: Status: ACTIVE | Noted: 2021-09-11

## 2021-09-11 PROBLEM — E66.813 CLASS 3 SEVERE OBESITY IN ADULT (HCC): Status: ACTIVE | Noted: 2021-09-07

## 2021-09-11 PROBLEM — B00.9 HSV INFECTION: Status: ACTIVE | Noted: 2021-09-11

## 2021-09-11 PROBLEM — J33.9 SAMTER'S TRIAD: Status: ACTIVE | Noted: 2021-09-11

## 2021-09-11 LAB
ALBUMIN SERPL BCP-MCNC: 3.1 G/DL (ref 3.2–4.9)
ALBUMIN/GLOB SERPL: 1.1 G/DL
ALP SERPL-CCNC: 37 U/L (ref 30–99)
ALT SERPL-CCNC: 17 U/L (ref 2–50)
ANION GAP SERPL CALC-SCNC: 11 MMOL/L (ref 7–16)
ANION GAP SERPL CALC-SCNC: 12 MMOL/L (ref 7–16)
APPEARANCE UR: CLEAR
AST SERPL-CCNC: 28 U/L (ref 12–45)
B-HCG SERPL-ACNC: <1 MIU/ML (ref 0–5)
BASOPHILS # BLD AUTO: 0.1 % (ref 0–1.8)
BASOPHILS # BLD: 0.01 K/UL (ref 0–0.12)
BILIRUB SERPL-MCNC: 0.3 MG/DL (ref 0.1–1.5)
BILIRUB UR QL STRIP.AUTO: NEGATIVE
BUN SERPL-MCNC: 13 MG/DL (ref 8–22)
BUN SERPL-MCNC: 13 MG/DL (ref 8–22)
CALCIUM SERPL-MCNC: 7.3 MG/DL (ref 8.5–10.5)
CALCIUM SERPL-MCNC: 8.6 MG/DL (ref 8.5–10.5)
CHLORIDE SERPL-SCNC: 100 MMOL/L (ref 96–112)
CHLORIDE SERPL-SCNC: 103 MMOL/L (ref 96–112)
CO2 SERPL-SCNC: 23 MMOL/L (ref 20–33)
CO2 SERPL-SCNC: 25 MMOL/L (ref 20–33)
COLOR UR: YELLOW
CREAT SERPL-MCNC: 0.49 MG/DL (ref 0.5–1.4)
CREAT SERPL-MCNC: 0.55 MG/DL (ref 0.5–1.4)
CRP SERPL HS-MCNC: 3.09 MG/DL (ref 0–0.75)
D DIMER PPP IA.FEU-MCNC: 0.44 UG/ML (FEU) (ref 0–0.5)
EOSINOPHIL # BLD AUTO: 0 K/UL (ref 0–0.51)
EOSINOPHIL NFR BLD: 0 % (ref 0–6.9)
ERYTHROCYTE [DISTWIDTH] IN BLOOD BY AUTOMATED COUNT: 43.7 FL (ref 35.9–50)
GLOBULIN SER CALC-MCNC: 2.9 G/DL (ref 1.9–3.5)
GLUCOSE SERPL-MCNC: 121 MG/DL (ref 65–99)
GLUCOSE SERPL-MCNC: 159 MG/DL (ref 65–99)
GLUCOSE UR STRIP.AUTO-MCNC: NEGATIVE MG/DL
HBV CORE AB SERPL QL IA: NONREACTIVE
HBV SURFACE AB SERPL IA-ACNC: <3.5 MIU/ML (ref 0–10)
HCT VFR BLD AUTO: 45.2 % (ref 37–47)
HGB BLD-MCNC: 15.1 G/DL (ref 12–16)
IMM GRANULOCYTES # BLD AUTO: 0.05 K/UL (ref 0–0.11)
IMM GRANULOCYTES NFR BLD AUTO: 0.7 % (ref 0–0.9)
KETONES UR STRIP.AUTO-MCNC: 15 MG/DL
LACTATE BLD-SCNC: 1.8 MMOL/L (ref 0.5–2)
LACTATE BLD-SCNC: 2.5 MMOL/L (ref 0.5–2)
LEUKOCYTE ESTERASE UR QL STRIP.AUTO: NEGATIVE
LYMPHOCYTES # BLD AUTO: 1.71 K/UL (ref 1–4.8)
LYMPHOCYTES NFR BLD: 23.9 % (ref 22–41)
MCH RBC QN AUTO: 30.5 PG (ref 27–33)
MCHC RBC AUTO-ENTMCNC: 33.4 G/DL (ref 33.6–35)
MCV RBC AUTO: 91.3 FL (ref 81.4–97.8)
MICRO URNS: ABNORMAL
MONOCYTES # BLD AUTO: 0.38 K/UL (ref 0–0.85)
MONOCYTES NFR BLD AUTO: 5.3 % (ref 0–13.4)
NEUTROPHILS # BLD AUTO: 5.01 K/UL (ref 2–7.15)
NEUTROPHILS NFR BLD: 70 % (ref 44–72)
NITRITE UR QL STRIP.AUTO: NEGATIVE
NRBC # BLD AUTO: 0 K/UL
NRBC BLD-RTO: 0 /100 WBC
NT-PROBNP SERPL IA-MCNC: 17 PG/ML (ref 0–125)
PH UR STRIP.AUTO: 6.5 [PH] (ref 5–8)
PLATELET # BLD AUTO: 280 K/UL (ref 164–446)
PMV BLD AUTO: 10.5 FL (ref 9–12.9)
POTASSIUM SERPL-SCNC: 3 MMOL/L (ref 3.6–5.5)
POTASSIUM SERPL-SCNC: 4.5 MMOL/L (ref 3.6–5.5)
PROCALCITONIN SERPL-MCNC: <0.05 NG/ML
PROT SERPL-MCNC: 6 G/DL (ref 6–8.2)
PROT UR QL STRIP: NEGATIVE MG/DL
RBC # BLD AUTO: 4.95 M/UL (ref 4.2–5.4)
RBC UR QL AUTO: NEGATIVE
SODIUM SERPL-SCNC: 136 MMOL/L (ref 135–145)
SODIUM SERPL-SCNC: 138 MMOL/L (ref 135–145)
SP GR UR STRIP.AUTO: 1.01
UROBILINOGEN UR STRIP.AUTO-MCNC: 1 MG/DL
WBC # BLD AUTO: 7.2 K/UL (ref 4.8–10.8)

## 2021-09-11 PROCEDURE — 86480 TB TEST CELL IMMUN MEASURE: CPT

## 2021-09-11 PROCEDURE — 80048 BASIC METABOLIC PNL TOTAL CA: CPT

## 2021-09-11 PROCEDURE — 770020 HCHG ROOM/CARE - TELE (206)

## 2021-09-11 PROCEDURE — 71045 X-RAY EXAM CHEST 1 VIEW: CPT

## 2021-09-11 PROCEDURE — 84145 PROCALCITONIN (PCT): CPT

## 2021-09-11 PROCEDURE — 700102 HCHG RX REV CODE 250 W/ 637 OVERRIDE(OP): Performed by: INTERNAL MEDICINE

## 2021-09-11 PROCEDURE — 83605 ASSAY OF LACTIC ACID: CPT | Mod: 91

## 2021-09-11 PROCEDURE — 99285 EMERGENCY DEPT VISIT HI MDM: CPT

## 2021-09-11 PROCEDURE — 96375 TX/PRO/DX INJ NEW DRUG ADDON: CPT

## 2021-09-11 PROCEDURE — 85025 COMPLETE CBC W/AUTO DIFF WBC: CPT

## 2021-09-11 PROCEDURE — 80053 COMPREHEN METABOLIC PANEL: CPT

## 2021-09-11 PROCEDURE — 86704 HEP B CORE ANTIBODY TOTAL: CPT

## 2021-09-11 PROCEDURE — 94640 AIRWAY INHALATION TREATMENT: CPT

## 2021-09-11 PROCEDURE — 36415 COLL VENOUS BLD VENIPUNCTURE: CPT

## 2021-09-11 PROCEDURE — 83880 ASSAY OF NATRIURETIC PEPTIDE: CPT

## 2021-09-11 PROCEDURE — 700111 HCHG RX REV CODE 636 W/ 250 OVERRIDE (IP): Performed by: HOSPITALIST

## 2021-09-11 PROCEDURE — 86706 HEP B SURFACE ANTIBODY: CPT

## 2021-09-11 PROCEDURE — A9270 NON-COVERED ITEM OR SERVICE: HCPCS | Performed by: HOSPITALIST

## 2021-09-11 PROCEDURE — 86140 C-REACTIVE PROTEIN: CPT

## 2021-09-11 PROCEDURE — 81003 URINALYSIS AUTO W/O SCOPE: CPT

## 2021-09-11 PROCEDURE — 700102 HCHG RX REV CODE 250 W/ 637 OVERRIDE(OP): Performed by: HOSPITALIST

## 2021-09-11 PROCEDURE — 84702 CHORIONIC GONADOTROPIN TEST: CPT

## 2021-09-11 PROCEDURE — 85379 FIBRIN DEGRADATION QUANT: CPT

## 2021-09-11 PROCEDURE — 700111 HCHG RX REV CODE 636 W/ 250 OVERRIDE (IP): Performed by: EMERGENCY MEDICINE

## 2021-09-11 PROCEDURE — 8E0ZXY6 ISOLATION: ICD-10-PCS | Performed by: EMERGENCY MEDICINE

## 2021-09-11 PROCEDURE — 87086 URINE CULTURE/COLONY COUNT: CPT

## 2021-09-11 PROCEDURE — 87040 BLOOD CULTURE FOR BACTERIA: CPT

## 2021-09-11 PROCEDURE — 96374 THER/PROPH/DIAG INJ IV PUSH: CPT

## 2021-09-11 PROCEDURE — A9270 NON-COVERED ITEM OR SERVICE: HCPCS | Performed by: INTERNAL MEDICINE

## 2021-09-11 RX ORDER — AMITRIPTYLINE HYDROCHLORIDE 10 MG/1
20 TABLET, FILM COATED ORAL NIGHTLY
COMMUNITY
End: 2021-10-19

## 2021-09-11 RX ORDER — MULTIVIT WITH MINERALS/LUTEIN
1000 TABLET ORAL DAILY
COMMUNITY
End: 2022-08-14

## 2021-09-11 RX ORDER — VITAMIN B COMPLEX
1000 TABLET ORAL DAILY
Status: DISCONTINUED | OUTPATIENT
Start: 2021-09-11 | End: 2021-09-19 | Stop reason: HOSPADM

## 2021-09-11 RX ORDER — POLYETHYLENE GLYCOL 3350 17 G/17G
1 POWDER, FOR SOLUTION ORAL
Status: DISCONTINUED | OUTPATIENT
Start: 2021-09-11 | End: 2021-09-14

## 2021-09-11 RX ORDER — MORPHINE SULFATE 4 MG/ML
1 INJECTION, SOLUTION INTRAMUSCULAR; INTRAVENOUS ONCE
Status: COMPLETED | OUTPATIENT
Start: 2021-09-11 | End: 2021-09-11

## 2021-09-11 RX ORDER — FUROSEMIDE 10 MG/ML
40 INJECTION INTRAMUSCULAR; INTRAVENOUS ONCE
Status: COMPLETED | OUTPATIENT
Start: 2021-09-11 | End: 2021-09-11

## 2021-09-11 RX ORDER — POTASSIUM CHLORIDE 20 MEQ/1
40 TABLET, EXTENDED RELEASE ORAL ONCE
Status: COMPLETED | OUTPATIENT
Start: 2021-09-11 | End: 2021-09-11

## 2021-09-11 RX ORDER — SUMATRIPTAN 50 MG/1
50 TABLET, FILM COATED ORAL EVERY 8 HOURS PRN
Status: DISCONTINUED | OUTPATIENT
Start: 2021-09-11 | End: 2021-09-19 | Stop reason: HOSPADM

## 2021-09-11 RX ORDER — AMITRIPTYLINE HYDROCHLORIDE 10 MG/1
20 TABLET, FILM COATED ORAL NIGHTLY
Status: DISCONTINUED | OUTPATIENT
Start: 2021-09-11 | End: 2021-09-19 | Stop reason: HOSPADM

## 2021-09-11 RX ORDER — DEXAMETHASONE SODIUM PHOSPHATE 4 MG/ML
6 INJECTION, SOLUTION INTRA-ARTICULAR; INTRALESIONAL; INTRAMUSCULAR; INTRAVENOUS; SOFT TISSUE ONCE
Status: COMPLETED | OUTPATIENT
Start: 2021-09-11 | End: 2021-09-11

## 2021-09-11 RX ORDER — ACETAMINOPHEN 325 MG/1
650 TABLET ORAL EVERY 6 HOURS PRN
Status: DISCONTINUED | OUTPATIENT
Start: 2021-09-11 | End: 2021-09-19 | Stop reason: HOSPADM

## 2021-09-11 RX ORDER — DEXAMETHASONE 6 MG/1
6 TABLET ORAL DAILY
COMMUNITY
End: 2021-10-04

## 2021-09-11 RX ORDER — ASCORBIC ACID 500 MG
1000 TABLET ORAL DAILY
Status: DISCONTINUED | OUTPATIENT
Start: 2021-09-11 | End: 2021-09-19 | Stop reason: HOSPADM

## 2021-09-11 RX ORDER — MONTELUKAST SODIUM 10 MG/1
10 TABLET ORAL DAILY
Status: DISCONTINUED | OUTPATIENT
Start: 2021-09-11 | End: 2021-09-19 | Stop reason: HOSPADM

## 2021-09-11 RX ORDER — ALBUTEROL SULFATE 90 UG/1
2 AEROSOL, METERED RESPIRATORY (INHALATION)
Status: DISCONTINUED | OUTPATIENT
Start: 2021-09-11 | End: 2021-09-19 | Stop reason: HOSPADM

## 2021-09-11 RX ORDER — DEXAMETHASONE SODIUM PHOSPHATE 10 MG/ML
6 INJECTION, SOLUTION INTRAMUSCULAR; INTRAVENOUS DAILY
Status: COMPLETED | OUTPATIENT
Start: 2021-09-12 | End: 2021-09-18

## 2021-09-11 RX ORDER — FUROSEMIDE 10 MG/ML
20 INJECTION INTRAMUSCULAR; INTRAVENOUS ONCE
Status: COMPLETED | OUTPATIENT
Start: 2021-09-11 | End: 2021-09-11

## 2021-09-11 RX ORDER — FEXOFENADINE HCL 180 MG/1
180 TABLET ORAL DAILY
COMMUNITY

## 2021-09-11 RX ORDER — GINSENG 100 MG
50 CAPSULE ORAL DAILY
Status: DISCONTINUED | OUTPATIENT
Start: 2021-09-11 | End: 2021-09-11

## 2021-09-11 RX ORDER — AMOXICILLIN 250 MG
2 CAPSULE ORAL 2 TIMES DAILY
Status: DISCONTINUED | OUTPATIENT
Start: 2021-09-11 | End: 2021-09-14

## 2021-09-11 RX ORDER — GUAIFENESIN 600 MG/1
1200 TABLET, EXTENDED RELEASE ORAL EVERY 12 HOURS
Status: DISCONTINUED | OUTPATIENT
Start: 2021-09-11 | End: 2021-09-18

## 2021-09-11 RX ORDER — VALACYCLOVIR HYDROCHLORIDE 500 MG/1
500 TABLET, FILM COATED ORAL DAILY
Status: COMPLETED | OUTPATIENT
Start: 2021-09-12 | End: 2021-09-16

## 2021-09-11 RX ORDER — BUDESONIDE AND FORMOTEROL FUMARATE DIHYDRATE 160; 4.5 UG/1; UG/1
2 AEROSOL RESPIRATORY (INHALATION)
Status: DISCONTINUED | OUTPATIENT
Start: 2021-09-11 | End: 2021-09-19 | Stop reason: HOSPADM

## 2021-09-11 RX ORDER — BISACODYL 10 MG
10 SUPPOSITORY, RECTAL RECTAL
Status: DISCONTINUED | OUTPATIENT
Start: 2021-09-11 | End: 2021-09-14

## 2021-09-11 RX ADMIN — FUROSEMIDE 20 MG: 10 INJECTION, SOLUTION INTRAMUSCULAR; INTRAVENOUS at 16:27

## 2021-09-11 RX ADMIN — DEXAMETHASONE SODIUM PHOSPHATE 6 MG: 4 INJECTION, SOLUTION INTRA-ARTICULAR; INTRALESIONAL; INTRAMUSCULAR; INTRAVENOUS; SOFT TISSUE at 07:36

## 2021-09-11 RX ADMIN — MORPHINE SULFATE 1 MG: 4 INJECTION INTRAVENOUS at 12:21

## 2021-09-11 RX ADMIN — Medication 1000 UNITS: at 11:28

## 2021-09-11 RX ADMIN — POTASSIUM CHLORIDE 40 MEQ: 1500 TABLET, EXTENDED RELEASE ORAL at 10:21

## 2021-09-11 RX ADMIN — GUAIFENESIN 1200 MG: 600 TABLET, EXTENDED RELEASE ORAL at 12:21

## 2021-09-11 RX ADMIN — BUDESONIDE AND FORMOTEROL FUMARATE DIHYDRATE 2 PUFF: 160; 4.5 AEROSOL RESPIRATORY (INHALATION) at 11:28

## 2021-09-11 RX ADMIN — MONTELUKAST 10 MG: 10 TABLET, FILM COATED ORAL at 11:28

## 2021-09-11 RX ADMIN — AMITRIPTYLINE HYDROCHLORIDE 20 MG: 10 TABLET, FILM COATED ORAL at 22:10

## 2021-09-11 RX ADMIN — BARICITINIB 4 MG: 2 TABLET, FILM COATED ORAL at 16:28

## 2021-09-11 RX ADMIN — POTASSIUM CHLORIDE 40 MEQ: 1500 TABLET, EXTENDED RELEASE ORAL at 12:21

## 2021-09-11 RX ADMIN — ACETAMINOPHEN 650 MG: 325 TABLET ORAL at 11:29

## 2021-09-11 RX ADMIN — OXYCODONE HYDROCHLORIDE AND ACETAMINOPHEN 1000 MG: 500 TABLET ORAL at 11:28

## 2021-09-11 RX ADMIN — GUAIFENESIN 1200 MG: 600 TABLET, EXTENDED RELEASE ORAL at 16:27

## 2021-09-11 RX ADMIN — ENOXAPARIN SODIUM 40 MG: 40 INJECTION SUBCUTANEOUS at 18:35

## 2021-09-11 RX ADMIN — FUROSEMIDE 40 MG: 10 INJECTION, SOLUTION INTRAMUSCULAR; INTRAVENOUS at 07:36

## 2021-09-11 ASSESSMENT — COGNITIVE AND FUNCTIONAL STATUS - GENERAL
MOBILITY SCORE: 23
DAILY ACTIVITIY SCORE: 24
SUGGESTED CMS G CODE MODIFIER MOBILITY: CI
CLIMB 3 TO 5 STEPS WITH RAILING: A LITTLE
SUGGESTED CMS G CODE MODIFIER DAILY ACTIVITY: CH

## 2021-09-11 ASSESSMENT — LIFESTYLE VARIABLES
AVERAGE NUMBER OF DAYS PER WEEK YOU HAVE A DRINK CONTAINING ALCOHOL: 0
TOTAL SCORE: 0
TOTAL SCORE: 0
EVER FELT BAD OR GUILTY ABOUT YOUR DRINKING: NO
ALCOHOL_USE: NO
HAVE YOU EVER FELT YOU SHOULD CUT DOWN ON YOUR DRINKING: NO
SUBSTANCE_ABUSE: 0
TOTAL SCORE: 0
ON A TYPICAL DAY WHEN YOU DRINK ALCOHOL HOW MANY DRINKS DO YOU HAVE: 0
EVER_SMOKED: NEVER
DOES PATIENT WANT TO STOP DRINKING: CANNOT ASSESS
EVER HAD A DRINK FIRST THING IN THE MORNING TO STEADY YOUR NERVES TO GET RID OF A HANGOVER: NO
CONSUMPTION TOTAL: NEGATIVE
HAVE PEOPLE ANNOYED YOU BY CRITICIZING YOUR DRINKING: NO
HOW MANY TIMES IN THE PAST YEAR HAVE YOU HAD 5 OR MORE DRINKS IN A DAY: 0

## 2021-09-11 ASSESSMENT — PATIENT HEALTH QUESTIONNAIRE - PHQ9
1. LITTLE INTEREST OR PLEASURE IN DOING THINGS: NOT AT ALL
SUM OF ALL RESPONSES TO PHQ9 QUESTIONS 1 AND 2: 0
2. FEELING DOWN, DEPRESSED, IRRITABLE, OR HOPELESS: NOT AT ALL

## 2021-09-11 ASSESSMENT — ENCOUNTER SYMPTOMS
CONSTIPATION: 0
HEADACHES: 0
ABDOMINAL PAIN: 0
EYE PAIN: 0
DIZZINESS: 0
NAUSEA: 0
DIARRHEA: 1
VOMITING: 0
ORTHOPNEA: 0
COUGH: 1
PALPITATIONS: 0
FEVER: 1
SHORTNESS OF BREATH: 1
CHILLS: 1

## 2021-09-11 ASSESSMENT — COPD QUESTIONNAIRES
DURING THE PAST 4 WEEKS HOW MUCH DID YOU FEEL SHORT OF BREATH: NONE/LITTLE OF THE TIME
COPD SCREENING SCORE: 1
HAVE YOU SMOKED AT LEAST 100 CIGARETTES IN YOUR ENTIRE LIFE: NO/DON'T KNOW
DO YOU EVER COUGH UP ANY MUCUS OR PHLEGM?: NO/ONLY WITH OCCASIONAL COLDS OR INFECTIONS

## 2021-09-11 ASSESSMENT — FIBROSIS 4 INDEX: FIB4 SCORE: 1.03

## 2021-09-11 NOTE — TELEPHONE ENCOUNTER
Called at 0130, patient alerted for O2 sat of 88% and RR of 40. Patient reported that she was proning and on 4L O2. Pt increased supplemental O2 to 5L.

## 2021-09-11 NOTE — ASSESSMENT & PLAN NOTE
On Desensitization therapy    -Aspirin 325 mg  -Fexofenadine 180 mg for reaction to Aspirin  -Can consider continuing

## 2021-09-11 NOTE — ED TRIAGE NOTES
Guilherme Flaherty    Chief Complaint   Patient presents with   • Shortness of Breath     COVID POS. Home O2 monitoring called and told her to come in. PT was found to be 80% on 5L NC and increased WOB.        Vitals:    09/11/21 0512   BP: 134/79   Pulse: (!) 101   Resp: (!) 25   Temp: 37.1 °C (98.7 °F)   SpO2: (!) 80%

## 2021-09-11 NOTE — ASSESSMENT & PLAN NOTE
-On home Amitriptyline for prophylaxis and Sumatriptan for abortive therapy  Plan:  -Continue home Amitriptyline  -Continue home Sumatriptan

## 2021-09-11 NOTE — ASSESSMENT & PLAN NOTE
-BMI 43.84  Plan:  - patient on importance of lifestyle modifications including increased physical activity and diet for weight loss

## 2021-09-11 NOTE — TELEPHONE ENCOUNTER
"Patient alerted for low O2. Made contact with the patient and highly encouraged patient to return to the ED. Patient stated \"I will just try to sit up and get my lungs working again. I have a ride to the ED if I need.\" O2 sat now 91%  "

## 2021-09-11 NOTE — TELEPHONE ENCOUNTER
Pt alerted for low oxygen at 2019, 2029. She stated that she is still in her coughing spell and cannot stop coughing. I advised her to come back into the ER. Pt stated she wanted to up her oxygen and see how that went before coming back in.

## 2021-09-11 NOTE — TELEPHONE ENCOUNTER
Pt alerted for low oxygen at 0003, 0013, 0023. Stated that she was fine, I advised that she turn her concentrator up to 4.5L.

## 2021-09-11 NOTE — ASSESSMENT & PLAN NOTE
-No PFT's on file  Plan:  -Continue Fluticase Furoate-Vilanterol 1 puff / day  -Continue Albuterol 2 puffs q6h PRN  -Continue Montelukast

## 2021-09-11 NOTE — PROGRESS NOTES
Brief ID note:    -Remdesivir approval requested by [facility] resident, Dr. Diaz  -Chart reviewed.  Patient admitted with COVID-19 pneumonia with progressive oxygen requirements, currently on 15 L  -Patient is already on dexamethasone, prophylactic Lovenox  -Remdesivir approved. Will initiate a 5-day course of IV remdesivir  -Monitor daily CMP. Discontinue remdesivir if ALT >500 or if creatinine clearance <30 or if oxygen requirements greater than 15 L, if immunocompetent.  -QuantiFERON gold and acute hepatitis panel already ordered  -Continue management per hospitalist teams. Please call us back if formal consult requested

## 2021-09-11 NOTE — ED NOTES
Med Rec completed: per patient at bedside.     No ORAL antibiotics in last 30 days    Preferred Pharmacy: [facility] [id number] [facility] [phone]      Pt confirmed following allergies:  Allergies   Allergen Reactions   • Aspirin      Patient states de-sensitized. Currently taking ASA 325mg BID   • Ibuprofen Anaphylaxis and Rash     Patient states de-sensitized   • Ibuprofen      Throat closes, throat itches, runny nose    • Sulfa Drugs      Airway closes.   • Asa [Aspirin] Runny Nose   • Latex Rash   • Latex      Rash and itching   • Sulfa Drugs Shortness of Breath   • Tape      Blister at the site.        Pt's home medications:   Medication Sig Notes   • amitriptyline (ELAVIL) 10 MG Tab Take 20 mg by mouth every evening.    • fexofenadine (ALLEGRA) 180 MG tablet Take 180 mg by mouth every day.    • Ascorbic Acid (VITAMIN C) 1000 MG Tab Take 1,000 mg by mouth every day.    • VITAMIN D PO Take 1 Tablet by mouth every day. Patient unsure of strength   • Multiple Vitamins-Minerals (ZINC PO) Take 1 Tablet by mouth every day. Patient unsure of strength   • dexamethasone (DECADRON) 6 MG Tab Take 6 mg by mouth every day. For 9 days IN MIDDLE OF COURSE.     Patient took Thursday and Friday dose.     Originally called in as injection - pharmacy switched to tablets.    • aspirin (ASA) 325 MG Tab Take 325 mg by mouth 2 times a day. Patient has documented allergy, but states she has de-sensitized to it and takes twice daily. Allergy information updated.    • guaiFENesin ER 1200 MG TABLET SR 12 HR Take 1 Tablet by mouth every 12 hours.    • montelukast (SINGULAIR) 10 MG Tab Take 10 mg by mouth every day.    • SUMAtriptan (IMITREX) 50 MG Tab Take  mg at the onset of aura/HA; may re-dose x1 after 2 hrs if HA persists; MDD: 200 mg; do not use >2 days/week.    • Fluticasone Furoate-Vilanterol (BREO ELLIPTA) 100-25 MCG/INH AEROSOL POWDER, BREATH ACTIVATED Inhale 1 Puff every day. Patient was unsure of strenth.  Called pharmacy and verified strength.    • valACYclovir (VALTREX) 500 MG Tab Take 500 mg by mouth every day. Patient takes long-term   • albuterol 108 (90 Base) MCG/ACT Aero Soln inhalation aerosol Inhale 2 Puffs every 6 hours as needed for Shortness of Breath.        Removed medications:   Medication Removal Reason   • dexamethasone pf (DECADRON) 10 MG/ML Solution Injection was called into pharmacy for patient. Pharmacy switched to tablets.    • amitriptyline (ELAVIL) 10 MG Tab Duplicate   • fexofenadine (ALLEGRA) 60 MG TABS Patient takes 180mg

## 2021-09-11 NOTE — PROGRESS NOTES
Brief ID note:    -Baricitinib approval requested by [facility] resident, Dr. Diaz  -Chart reviewed.  Patient admitted with COVID-19 pneumonia with progressive oxygen requirements, currently on  50 L high flow nasal cannula  -Patient is already on dexamethasone, prophylactic Lovenox  -Discontinued Remdesivir  -Monitor daily CMP  -Discontinue baricitinib if ALT >250, AST >225, eGFR <15, ALC l< 200, ANC < 1000  -OK to discontinue barticinib if patient cleared for discharge  -Please provide EUA fact sheet regarding baricitinib  -Continue management per hospitalist teams. Please call us back if formal consult requested

## 2021-09-11 NOTE — PROGRESS NOTES
Please message admitting team resident physicians Dr. Elpidio Diaz (first contact) or Dr. Allyson Brice (second contact) for any patient care issues today (9/11) until 5:30pm.

## 2021-09-11 NOTE — H&P
History & Physical Note    Date of Admission: 9/11/2021  Admission Status: Emergency  [facility] Team: [facility] Admitting team  Attending: Leatha Easley M.D.   Senior Resident: Dr. Brice  Intern: Dr. Diaz  Contact Number: [phone]    Chief Complaint: SOB    History of Present Illness (HPI):   Guilherme is a 36 y.o. female with a PMHx of Migraines, HSV, Samter's triad desensitization therapy, Asthma who presented 9/11/2021 with SOB. She was diagnosed with COVID on 9/6/21 on ED visit, but had symptom onset on 9/1/21. Patient was discharged on Home O2 monitoring program and received 2 doses of dexamethasone before failure with increasing hypoxia. She has had fever, chills, malaise, diarrhea (1-2x)/day for the past 2-3 days. She is not vaccinated for COVID-19. Her symptoms were relieved with proning at night and exacerbated with activity.    In the ED, pertinent positive vitals include: 80%@5L NC, pulse of 101, RR of 25. Pertinent labs include: K: 3.0, LA of 2.5 decreased to 1.8, D-dimer & BNP WNL, negative UA. CXR: pulmonary edema/infiltrates increased from previous on 9/6. She has received Furosemide 40 mg IV and Dexamethasone 6 mg in the ED.    Review of Systems: .  Review of Systems   Constitutional: Positive for chills, fever and malaise/fatigue.   Eyes: Negative for pain.   Respiratory: Positive for cough and shortness of breath.    Cardiovascular: Negative for chest pain, palpitations, orthopnea and leg swelling.   Gastrointestinal: Positive for diarrhea. Negative for abdominal pain, constipation, nausea and vomiting.   Genitourinary: Negative for dysuria.   Neurological: Negative for dizziness and headaches.   Psychiatric/Behavioral: Negative for substance abuse.   All other systems reviewed and are negative.        Past Medical History:   Past Medical History was reviewed with patient.   has a past medical history of Abnormal weight gain, Allergic rhinitis, cause unspecified, Alopecia, unspecified, Asthma (02/04/2021),  Depressive disorder, not elsewhere classified, Dermatitis (with stretch marks opening), Environmental allergies, Family history of other chronic respiratory conditions, Insomnia, unspecified, Open wound of abdominal wall, anterior, without mention of complication, Other atopic dermatitis and related conditions, Other extrapyramidal disease and abnormal movement disorder, Other malaise and fatigue, Pap smear ( 10/2006), Pneumococcal infection, Pneumonia (2014), Snoring, and Unspecified migraine.    Past Surgical History: Past Surgical History was reviewed with patient.   has a past surgical history that includes primary c section (x 3); tubal coagulation laparoscopic bilateral; abdominal hysterectomy total (8/29/08); abdominoplasty (8/10/2012); liposuction (8/10/2012); lesion excision general (8/10/2012); gyn surgery (08/2000); gyn surgery (07/2003); gyn surgery (07/2004); gyn surgery (08/2008); other abdominal surgery (08/2012); other (06/2014); pr repair of nasal septum (Bilateral, 2/11/2021); stereotactic computer assisted procedure cranial, extradural (Bilateral, 2/11/2021); endoscopy, paranasal sinus, with total ethmoidectomy, sn (Bilateral, 2/11/2021); and sinus washing (Bilateral, 2/18/2021).    Medications: Medications have been reviewed with patient.  Prior to Admission Medications   Prescriptions Last Dose Informant Patient Reported? Taking?   Fluticasone Furoate-Vilanterol (BREO ELLIPTA) 200-25 MCG/INH AEROSOL POWDER, BREATH ACTIVATED  Patient Yes No   Sig: Inhale 1 Puff every day.   SUMAtriptan (IMITREX) 50 MG Tab  Patient No No   Sig: Take  mg at the onset of aura/HA; may re-dose x1 after 2 hrs if HA persists; MDD: 200 mg; do not use >2 days/week.   albuterol 108 (90 Base) MCG/ACT Aero Soln inhalation aerosol  Patient Yes No   Sig: Inhale 2 Puffs every 6 hours as needed for Shortness of Breath.   amitriptyline (ELAVIL) 10 MG Tab  Patient Yes No   Sig: Take 20 mg by mouth every evening.   aspirin  (ASA) 325 MG Tab  Patient Yes No   Sig: Take 325 mg by mouth 2 times a day.   dexamethasone pf (DECADRON) 10 MG/ML Solution   No No   Sig: Take 0.6 mL by mouth every day for 9 days.   fexofenadine (ALLEGRA) 60 MG TABS  Patient Yes No   Sig: Take 180 mg by mouth every day.   guaiFENesin ER 1200 MG TABLET SR 12 HR   No No   Sig: Take 1 Tablet by mouth every 12 hours.   montelukast (SINGULAIR) 10 MG Tab  Patient Yes No   Sig: Take 10 mg by mouth every day.   valACYclovir (VALTREX) 500 MG Tab  Patient Yes No   Sig: Take 500 mg by mouth every day.      Facility-Administered Medications: None        Allergies: Allergies have been reviewed with patient.  Allergies   Allergen Reactions   • Aspirin      Patient states de-sensitized. Currently taking ASA 325mg BID   • Ibuprofen Anaphylaxis and Rash     Patient states de-sensitized   • Ibuprofen      Throat closes, throat itches, runny nose    • Sulfa Drugs      Airway closes.   • Asa [Aspirin] Runny Nose   • Latex Rash   • Latex      Rash and itching   • Sulfa Drugs Shortness of Breath   • Tape      Blister at the site.       Family History:   family history includes Cancer in her father; Hypertension in her father; Other in her mother.     Social History:   Tobacco: Denies  Alcohol: Socially  Recreational drugs (illegal and prescription):  Denies   Primary Care Provider: reviewed Pcp Pt States None    Physical Exam:   Vitals:  Temp:  [36.2 °C (97.1 °F)-37.1 °C (98.7 °F)] 36.2 °C (97.1 °F)  Pulse:  [] 93  Resp:  [20-25] 24  BP: (124-143)/(65-84) 124/79  SpO2:  [80 %-96 %] 93 %    Physical Exam  Vitals and nursing note reviewed.   Constitutional:       Appearance: Normal appearance.   HENT:      Head: Normocephalic and atraumatic.      Right Ear: External ear normal.      Left Ear: External ear normal.      Nose: Nose normal.      Mouth/Throat:      Mouth: Mucous membranes are moist.      Pharynx: Oropharynx is clear.   Eyes:      Extraocular Movements: Extraocular  movements intact.      Pupils: Pupils are equal, round, and reactive to light.   Cardiovascular:      Rate and Rhythm: Normal rate and regular rhythm.      Pulses: Normal pulses.      Heart sounds: Normal heart sounds. No murmur heard.   No friction rub. No gallop.    Pulmonary:      Effort: Respiratory distress present.      Breath sounds: No stridor. Decreased breath sounds and rales (diffuse) present. No wheezing or rhonchi.   Abdominal:      General: There is no distension.      Palpations: Abdomen is soft.      Tenderness: There is no abdominal tenderness. There is no guarding or rebound.   Musculoskeletal:         General: Normal range of motion.      Cervical back: Normal range of motion.   Skin:     General: Skin is warm.      Capillary Refill: Capillary refill takes less than 2 seconds.      Coloration: Skin is not jaundiced.   Neurological:      General: No focal deficit present.      Mental Status: She is alert and oriented to person, place, and time. Mental status is at baseline.   Psychiatric:         Mood and Affect: Mood normal.         Behavior: Behavior normal.         Thought Content: Thought content normal.         Judgment: Judgment normal.             Labs:   Results for orders placed or performed during the hospital encounter of 09/11/21   Lactic acid (lactate)   Result Value Ref Range    Lactic Acid 2.5 (H) 0.5 - 2.0 mmol/L   Lactic acid (lactate): Repeat if initial lactic acid result is greater than 2   Result Value Ref Range    Lactic Acid 1.8 0.5 - 2.0 mmol/L   CBC WITH DIFFERENTIAL   Result Value Ref Range    WBC 7.2 4.8 - 10.8 K/uL    RBC 4.95 4.20 - 5.40 M/uL    Hemoglobin 15.1 12.0 - 16.0 g/dL    Hematocrit 45.2 37.0 - 47.0 %    MCV 91.3 81.4 - 97.8 fL    MCH 30.5 27.0 - 33.0 pg    MCHC 33.4 (L) 33.6 - 35.0 g/dL    RDW 43.7 35.9 - 50.0 fL    Platelet Count 280 164 - 446 K/uL    MPV 10.5 9.0 - 12.9 fL    Neutrophils-Polys 70.00 44.00 - 72.00 %    Lymphocytes 23.90 22.00 - 41.00 %     Monocytes 5.30 0.00 - 13.40 %    Eosinophils 0.00 0.00 - 6.90 %    Basophils 0.10 0.00 - 1.80 %    Immature Granulocytes 0.70 0.00 - 0.90 %    Nucleated RBC 0.00 /100 WBC    Neutrophils (Absolute) 5.01 2.00 - 7.15 K/uL    Lymphs (Absolute) 1.71 1.00 - 4.80 K/uL    Monos (Absolute) 0.38 0.00 - 0.85 K/uL    Eos (Absolute) 0.00 0.00 - 0.51 K/uL    Baso (Absolute) 0.01 0.00 - 0.12 K/uL    Immature Granulocytes (abs) 0.05 0.00 - 0.11 K/uL    NRBC (Absolute) 0.00 K/uL   COMP METABOLIC PANEL   Result Value Ref Range    Sodium 138 135 - 145 mmol/L    Potassium 3.0 (L) 3.6 - 5.5 mmol/L    Chloride 103 96 - 112 mmol/L    Co2 23 20 - 33 mmol/L    Anion Gap 12.0 7.0 - 16.0    Glucose 121 (H) 65 - 99 mg/dL    Bun 13 8 - 22 mg/dL    Creatinine 0.49 (L) 0.50 - 1.40 mg/dL    Calcium 7.3 (L) 8.5 - 10.5 mg/dL    AST(SGOT) 28 12 - 45 U/L    ALT(SGPT) 17 2 - 50 U/L    Alkaline Phosphatase 37 30 - 99 U/L    Total Bilirubin 0.3 0.1 - 1.5 mg/dL    Albumin 3.1 (L) 3.2 - 4.9 g/dL    Total Protein 6.0 6.0 - 8.2 g/dL    Globulin 2.9 1.9 - 3.5 g/dL    A-G Ratio 1.1 g/dL   URINALYSIS    Specimen: Urine, Clean Catch   Result Value Ref Range    Color Yellow     Character Clear     Specific Gravity 1.013 <1.035    Ph 6.5 5.0 - 8.0    Glucose Negative Negative mg/dL    Ketones 15 (A) Negative mg/dL    Protein Negative Negative mg/dL    Bilirubin Negative Negative    Urobilinogen, Urine 1.0 Negative    Nitrite Negative Negative    Leukocyte Esterase Negative Negative    Occult Blood Negative Negative    Micro Urine Req see below    D-DIMER   Result Value Ref Range    D-Dimer Screen 0.44 0.00 - 0.50 ug/mL (FEU)   ESTIMATED GFR   Result Value Ref Range    GFR If African American >60 >60 mL/min/1.73 m 2    GFR If Non African American >60 >60 mL/min/1.73 m 2   proBrain Natriuretic Peptide, NT   Result Value Ref Range    NT-proBNP 17 0 - 125 pg/mL   PROCALCITONIN   Result Value Ref Range    Procalcitonin <0.05 <0.25 ng/mL   HEP B CORE AB TOTAL   Result  Value Ref Range    Hepatitis B Core Ab, Total NonReactive Non-Reactive   HEP B SURFACE AB   Result Value Ref Range    Hep B Surface Antibody Quant <3.50 0.00 - 10.00 mIU/mL   HCG QUANTITATIVE   Result Value Ref Range    Bhcg <1.0 0.0 - 5.0 mIU/mL        Imaging:   DX-CHEST-PORTABLE (1 VIEW)   Final Result         1.  Pulmonary edema and/or infiltrates are identified, which appear somewhat increased since the prior exam.           Previous Data Review: reviewed    Problem Representation:     Guilherme is a 36 y.o. female with a PMHx of Migraines, HSV, Samter's triad desensitization therapy, Asthma who presented 9/11/2021 with SOB and is admitted for Acute hypoxemic respiratory failure 2/2 to COVID-19 viral pneumonia.    Acute hypoxemic respiratory failure due to COVID-19 (HCC)  Assessment & Plan  Failed home O2 monitoring program on 9/6 and was re-admitted with O2 sats in the 80s on 5L NC  CXR: Pulmonary edema and/or infiltrates    -Received 3 doses of 6 mg dexamethasone (including today)        -Continue 7 more doses until 9/18/21  -Per consult with ID, Dr. Pemberton, patient was given 1 dose of 100 mg Remdesivir before discontinuation due to patient's worsening hypoxia.  -Patient is a candidate for Baricitinib and started by ID, Dr. Pemberton, due to O2 requirements of 50L HFNC and FIO2: 80%. Negative Hepatitis B panel and Quantiferon gold  -Keep O2 saturation >88%  -RT consult  -Will monitor progress with procalcitonin and CRP  -Recommended pt for mobilization and self proning  -Guaifenesin ER 1200 mg for symptomatic management  -Duoneb BID   -Patient received 40 mg IV Furosemide, given another 20 mg IV        -Re examine patient once net I/Os >2L        -If FLAQUITA or patient complains of thirst, ok to resume fluids (500 CC IV fluid bolus)  -Per conversation with patient, if patient becomes unable to make medical decisions, her  Roldan Voss is her DPOA      Migraines  Assessment & Plan  -continue with home medication  of Suatriptan 50 mg q24h  -Amitriptyline 10 mg q24h for headache    Hypokalemia- (present on admission)  Assessment & Plan  K 3.0 at admission, likely secondary to dietary intake and COVID.  -repleted with KCl 40meq x 2 doses  -Will recheck electrolytes with BMP at 10 p.m.    Asthma- (present on admission)  Assessment & Plan  -Continue w/ Montelukast 10 mg q24h  -Fluticase Furoate-Vilanterol 1 puff / day  -Albuterol 2 puffs q6h PRN      Samter's triad  Assessment & Plan  On Desensitization therapy    -Aspirin 325 mg  -Fexofenadine 180 mg for reaction to Aspirin  -Can consider continuing    HSV infection  Assessment & Plan  Controlled    -Continue w/ home medication of Valacyclovir 500 mg q24h  -Patient takes it chronically    Class 3 severe obesity in adult (HCC)- (present on admission)  Assessment & Plan  BMI 43.84         Core Measures  #DVT prophylaxis - Enoxaparin 40 mg BID due to increased BMI  #FEN/GI - Regular Diet  #Bowel Regime  Code Status - Full Code

## 2021-09-11 NOTE — ED PROVIDER NOTES
ED Provider Note    CHIEF COMPLAINT  Chief Complaint   Patient presents with   • Shortness of Breath     COVID POS. Home O2 monitoring called and told her to come in. PT was found to be 80% on 5L NC and increased WOB.        HPI  Guilherme Flaherty is a 36 y.o. female who presents for evaluation of increasing shortness of breath.  Patient was diagnosed with Covid on the sixth of this month and was admitted overnight to arrange oxygen at home.  Patient has been on oxygen which is steadily had to be increased due to increased shortness of breath and hypoxemia measured by the remote patient monitoring system.  Patient returns tonight as even on 5 L she was still hypoxic and incredibly short of breath.    REVIEW OF SYSTEMS  Constitutional: Subjective fevers and chills  Skin: No rashes  HEENT: Mild sore throat, no runny nose or trouble swallowing  Neck: No neck pain, stiffness, or masses.  Chest: Chest pain with coughing  Pulm: Shortness of breath with paroxysms of coughing.  Breathing  Gastrointestinal: No nausea, vomiting, diarrhea, or abdominal pain.  Genitourinary: No dysuria or hematuria  Musculoskeletal: No  pain, swelling, weakness  Neurologic: No sensory or motor changes. No confusion or disorientation.  Heme: No bleeding or bruising problems.   Immuno: No hx of recurrent infections      PAST MEDICAL HISTORY   has a past medical history of Abnormal weight gain, Allergic rhinitis, cause unspecified, Alopecia, unspecified, Asthma (02/04/2021), Depressive disorder, not elsewhere classified, Dermatitis (with stretch marks opening), Environmental allergies, Family history of other chronic respiratory conditions, Insomnia, unspecified, Open wound of abdominal wall, anterior, without mention of complication, Other atopic dermatitis and related conditions, Other extrapyramidal disease and abnormal movement disorder, Other malaise and fatigue, Pap smear ( 10/2006), Pneumococcal infection, Pneumonia (2014), Snoring, and  "Unspecified migraine.    SOCIAL HISTORY  Social History     Tobacco Use   • Smoking status: Never Smoker   • Smokeless tobacco: Never Used   Vaping Use   • Vaping Use: Never used   Substance and Sexual Activity   • Alcohol use: Yes     Comment: occasional   • Drug use: Never   • Sexual activity: Not on file       SURGICAL HISTORY   has a past surgical history that includes primary c section (x 3); tubal coagulation laparoscopic bilateral; abdominal hysterectomy total (8/29/08); abdominoplasty (8/10/2012); liposuction (8/10/2012); lesion excision general (8/10/2012); gyn surgery (08/2000); gyn surgery (07/2003); gyn surgery (07/2004); gyn surgery (08/2008); other abdominal surgery (08/2012); other (06/2014); repair of nasal septum (Bilateral, 2/11/2021); stereotactic computer assisted procedure cranial, extradural (Bilateral, 2/11/2021); endoscopy, paranasal sinus, with total ethmoidectomy, sn (Bilateral, 2/11/2021); and sinus washing (Bilateral, 2/18/2021).    CURRENT MEDICATIONS  Home Medications    **Home medications have not yet been reviewed for this encounter**         ALLERGIES  Allergies   Allergen Reactions   • Aspirin      Throat closes, throat itches, runny nose    • Ibuprofen Rash and Anaphylaxis   • Ibuprofen      Throat closes, throat itches, runny nose    • Sulfa Drugs      Airway closes.   • Asa [Aspirin] Runny Nose   • Latex Rash   • Latex      Rash and itching   • Sulfa Drugs Shortness of Breath   • Tape      Blister at the site.       PHYSICAL EXAM  VITAL SIGNS: /79   Pulse (!) 101   Temp 37.1 °C (98.7 °F) (Temporal)   Resp (!) 25   Ht 1.549 m (5' 1\")   Wt 105 kg (232 lb)   SpO2 (!) 80%   BMI 43.84 kg/m²    Gen: Appears tired, rapid breathing, flushed cheeks  HEENT: No signs of trauma, Bilateral external ears normal, Nose normal. Conjunctiva normal, Non-icteric.    Cardiovascular: Tachycardia with regular rhythm.  Capillary refill less than 3 seconds to all extremities, 2+ distal " pulses.  Thorax & Lungs: Tachypnea, crackles in all lung fields, moderately increased work of breathing  Abdomen: Bowel sounds normal, Soft, No tenderness, No masses, No pulsatile masses. No Guarding or rebound  Skin: Warm, Dry  Extremities: Intact distal pulses, No edema  Neurologic: Alert , no facial droop, grossly normal coordination and strength  Psychiatric: Affect pleasant    LABS  Results for orders placed or performed during the hospital encounter of 09/11/21   Lactic acid (lactate)   Result Value Ref Range    Lactic Acid 2.5 (H) 0.5 - 2.0 mmol/L   CBC WITH DIFFERENTIAL   Result Value Ref Range    WBC 7.2 4.8 - 10.8 K/uL    RBC 4.95 4.20 - 5.40 M/uL    Hemoglobin 15.1 12.0 - 16.0 g/dL    Hematocrit 45.2 37.0 - 47.0 %    MCV 91.3 81.4 - 97.8 fL    MCH 30.5 27.0 - 33.0 pg    MCHC 33.4 (L) 33.6 - 35.0 g/dL    RDW 43.7 35.9 - 50.0 fL    Platelet Count 280 164 - 446 K/uL    MPV 10.5 9.0 - 12.9 fL    Neutrophils-Polys 70.00 44.00 - 72.00 %    Lymphocytes 23.90 22.00 - 41.00 %    Monocytes 5.30 0.00 - 13.40 %    Eosinophils 0.00 0.00 - 6.90 %    Basophils 0.10 0.00 - 1.80 %    Immature Granulocytes 0.70 0.00 - 0.90 %    Nucleated RBC 0.00 /100 WBC    Neutrophils (Absolute) 5.01 2.00 - 7.15 K/uL    Lymphs (Absolute) 1.71 1.00 - 4.80 K/uL    Monos (Absolute) 0.38 0.00 - 0.85 K/uL    Eos (Absolute) 0.00 0.00 - 0.51 K/uL    Baso (Absolute) 0.01 0.00 - 0.12 K/uL    Immature Granulocytes (abs) 0.05 0.00 - 0.11 K/uL    NRBC (Absolute) 0.00 K/uL   COMP METABOLIC PANEL   Result Value Ref Range    Sodium 138 135 - 145 mmol/L    Potassium 3.0 (L) 3.6 - 5.5 mmol/L    Chloride 103 96 - 112 mmol/L    Co2 23 20 - 33 mmol/L    Anion Gap 12.0 7.0 - 16.0    Glucose 121 (H) 65 - 99 mg/dL    Bun 13 8 - 22 mg/dL    Creatinine 0.49 (L) 0.50 - 1.40 mg/dL    Calcium 7.3 (L) 8.5 - 10.5 mg/dL    AST(SGOT) 28 12 - 45 U/L    ALT(SGPT) 17 2 - 50 U/L    Alkaline Phosphatase 37 30 - 99 U/L    Total Bilirubin 0.3 0.1 - 1.5 mg/dL    Albumin 3.1  (L) 3.2 - 4.9 g/dL    Total Protein 6.0 6.0 - 8.2 g/dL    Globulin 2.9 1.9 - 3.5 g/dL    A-G Ratio 1.1 g/dL   D-DIMER   Result Value Ref Range    D-Dimer Screen 0.44 0.00 - 0.50 ug/mL (FEU)   ESTIMATED GFR   Result Value Ref Range    GFR If African American >60 >60 mL/min/1.73 m 2    GFR If Non African American >60 >60 mL/min/1.73 m 2   proBrain Natriuretic Peptide, NT   Result Value Ref Range    NT-proBNP 17 0 - 125 pg/mL       RADIOLOGY  DX-CHEST-PORTABLE (1 VIEW)   Final Result         1.  Pulmonary edema and/or infiltrates are identified, which appear somewhat increased since the prior exam.        Critical Care Note  Upon my evaluation, this patient had high probability of imminent and life-threatening deterioration due to hypoxemic respiratory failure due to Covid pneumonia, hypokalemia, which required my direct attention, intervention, and personal management. I personally provided 35 minutes of critical care time exclusive of time spent on separately billable procedures. Time includes review of laboratory data, radiology results, discussion with consultants, and monitoring for potential decompensation.     COURSE & MEDICAL DECISION MAKING  Patient arrives for evaluation of symptoms highly suggestive of acute to subacute hypoxemic respiratory failure due to Covid pneumonia.  Patient was noted to be on supplemental oxygen when she was discharged a few days ago but has had worsening symptoms requiring increasing oxygen at home.  Tonight, it was suggested that she come in by the monitoring service due to hypoxia despite 5 L per nasal cannula.  Patient arrives tachypneic with increased respiratory rate, work of breathing, and crackles in all lung fields.  I suspect this is simply a worsening of Covid however I will rescreen her labs including procalcitonin and D-dimer.    7:51 AM  Discussed the case with the [facility] internal medicine team who will admit the patient primarily for her worsening hypoxemia.  At this  point I do not suspect a bacterial cause and it is notable that her white blood cell count is within normal limits.  Her D-dimer and BNP are also normal however her procalcitonin has not returned.  Patient remains on 15 L per nonrebreather and is maintaining saturations in the upper 90s at this point.  She was given a dose of 6 mg IV Decadron as well as 40 mg of Lasix IV in the emergency department.    FINAL IMPRESSION  1. Acute respiratory failure with hypoxemia (HCC)    2. COVID-19    3. Hypokalemia        Electronically signed by: Ketan Benjamin M.D., 9/11/2021 5:41 AM

## 2021-09-11 NOTE — TELEPHONE ENCOUNTER
Pt alerted for low oxygen at 0408, 0418. 0424, 0429. [facility] was called out @ 0415 and pt agreed to coming back into the ER.

## 2021-09-11 NOTE — ASSESSMENT & PLAN NOTE
K 3.0 at admission, likely secondary to dietary intake and COVID.  -repleted with KCl 40meq x 2 doses  -Will recheck electrolytes with BMP at 10 p.m.

## 2021-09-11 NOTE — PROGRESS NOTES
Pt to TX to SMT. Called report to RN. Pt leaves by elena with ER RN, belongings collected,  at side. Updated pt and  concerning plan of care, questions answered.

## 2021-09-11 NOTE — TELEPHONE ENCOUNTER
Pt alerted for low oxygen at 0225. She stated that she felt fine. She is now maxed out at 5L, I advised her to take deep breaths.

## 2021-09-11 NOTE — TELEPHONE ENCOUNTER
Pt alerted for low oxygen at 2353. She stated that she felt fine and was going to turn her concentrator up .5L

## 2021-09-11 NOTE — ASSESSMENT & PLAN NOTE
-Failed home O2 monitoring program on 9/6 and was re-admitted with O2 sats in the 80s on 5L NC  -CXR: pulmonary edema and/or infiltrates, consistent with COVID pneumonia  -Negative Hepatitis B panel and Quantiferon gold  -CRP 3.09 -> 0.85  -No improvement with empiric trial of diuresis  Plan:  -Continue Dexamethasone (10d complete on 9/18)  -Continue Baricitinib (14d until 9/25)  -Continue DuoNeb PRN  -RT-C, continue to assess respiratory status  -Continue telemetry monitoring  -Keep patient's  Roldan Voss (DPOA) updated daily on status  -Guaifenesin ER 1200 mg for symptomatic management  -Recommended for mobilization and self proning  -Obtaining TTE  -Trend CRP q48h next draw on 9/15/21  -Discontinue Baricitinib if ALT >250; AST >225, eGFR <15; ALC I <200, ANC <1000  -Notified charge nurse to have Rapid Response team to keep close monitoring on this patient for potential acute decompensation in setting of ongoing lack of ICU bed availability

## 2021-09-11 NOTE — TELEPHONE ENCOUNTER
Patient alerted for low O2 of 86%. Patient has agreed to wake up significant other and go to the ED.

## 2021-09-12 PROBLEM — Z88.6 ASPIRIN-EXACERBATED RESPIRATORY DISEASE (AERD): Status: ACTIVE | Noted: 2021-09-12

## 2021-09-12 PROBLEM — J33.9 ASPIRIN-EXACERBATED RESPIRATORY DISEASE (AERD): Status: ACTIVE | Noted: 2021-09-12

## 2021-09-12 PROBLEM — J96.01 ACUTE RESPIRATORY FAILURE WITH HYPOXIA (HCC): Status: RESOLVED | Noted: 2021-09-11 | Resolved: 2021-09-12

## 2021-09-12 PROBLEM — J33.9 SAMTER'S TRIAD: Status: RESOLVED | Noted: 2021-09-11 | Resolved: 2021-09-12

## 2021-09-12 PROBLEM — Z88.6 SAMTER'S TRIAD: Status: RESOLVED | Noted: 2021-09-11 | Resolved: 2021-09-12

## 2021-09-12 PROBLEM — E87.6 HYPOKALEMIA: Status: RESOLVED | Noted: 2021-09-11 | Resolved: 2021-09-12

## 2021-09-12 PROBLEM — J45.909 ASPIRIN-EXACERBATED RESPIRATORY DISEASE (AERD): Status: ACTIVE | Noted: 2021-09-12

## 2021-09-12 PROBLEM — J45.909 SAMTER'S TRIAD: Status: RESOLVED | Noted: 2021-09-11 | Resolved: 2021-09-12

## 2021-09-12 LAB
ALBUMIN SERPL BCP-MCNC: 3.4 G/DL (ref 3.2–4.9)
ALBUMIN/GLOB SERPL: 1 G/DL
ALP SERPL-CCNC: 40 U/L (ref 30–99)
ALT SERPL-CCNC: 20 U/L (ref 2–50)
ANION GAP SERPL CALC-SCNC: 11 MMOL/L (ref 7–16)
AST SERPL-CCNC: 30 U/L (ref 12–45)
BASOPHILS # BLD AUTO: 0 % (ref 0–1.8)
BASOPHILS # BLD: 0 K/UL (ref 0–0.12)
BILIRUB SERPL-MCNC: 0.4 MG/DL (ref 0.1–1.5)
BUN SERPL-MCNC: 14 MG/DL (ref 8–22)
CALCIUM SERPL-MCNC: 8.5 MG/DL (ref 8.5–10.5)
CHLORIDE SERPL-SCNC: 99 MMOL/L (ref 96–112)
CO2 SERPL-SCNC: 26 MMOL/L (ref 20–33)
CREAT SERPL-MCNC: 0.58 MG/DL (ref 0.5–1.4)
EOSINOPHIL # BLD AUTO: 0 K/UL (ref 0–0.51)
EOSINOPHIL NFR BLD: 0 % (ref 0–6.9)
ERYTHROCYTE [DISTWIDTH] IN BLOOD BY AUTOMATED COUNT: 42.2 FL (ref 35.9–50)
GLOBULIN SER CALC-MCNC: 3.3 G/DL (ref 1.9–3.5)
GLUCOSE SERPL-MCNC: 117 MG/DL (ref 65–99)
HCT VFR BLD AUTO: 42.5 % (ref 37–47)
HGB BLD-MCNC: 14 G/DL (ref 12–16)
LYMPHOCYTES # BLD AUTO: 1.45 K/UL (ref 1–4.8)
LYMPHOCYTES NFR BLD: 26.3 % (ref 22–41)
MANUAL DIFF BLD: NORMAL
MCH RBC QN AUTO: 29.9 PG (ref 27–33)
MCHC RBC AUTO-ENTMCNC: 32.9 G/DL (ref 33.6–35)
MCV RBC AUTO: 90.6 FL (ref 81.4–97.8)
MONOCYTES # BLD AUTO: 0.97 K/UL (ref 0–0.85)
MONOCYTES NFR BLD AUTO: 17.6 % (ref 0–13.4)
MORPHOLOGY BLD-IMP: NORMAL
NEUTROPHILS # BLD AUTO: 3.09 K/UL (ref 2–7.15)
NEUTROPHILS NFR BLD: 56.1 % (ref 44–72)
NRBC # BLD AUTO: 0 K/UL
NRBC BLD-RTO: 0 /100 WBC
PLATELET # BLD AUTO: 306 K/UL (ref 164–446)
PLATELET BLD QL SMEAR: NORMAL
PMV BLD AUTO: 10 FL (ref 9–12.9)
POTASSIUM SERPL-SCNC: 4.5 MMOL/L (ref 3.6–5.5)
PROT SERPL-MCNC: 6.7 G/DL (ref 6–8.2)
RBC # BLD AUTO: 4.69 M/UL (ref 4.2–5.4)
RBC BLD AUTO: NORMAL
SODIUM SERPL-SCNC: 136 MMOL/L (ref 135–145)
WBC # BLD AUTO: 5.5 K/UL (ref 4.8–10.8)

## 2021-09-12 PROCEDURE — A9270 NON-COVERED ITEM OR SERVICE: HCPCS | Performed by: INTERNAL MEDICINE

## 2021-09-12 PROCEDURE — 770020 HCHG ROOM/CARE - TELE (206)

## 2021-09-12 PROCEDURE — A9270 NON-COVERED ITEM OR SERVICE: HCPCS | Performed by: HOSPITALIST

## 2021-09-12 PROCEDURE — 700111 HCHG RX REV CODE 636 W/ 250 OVERRIDE (IP): Performed by: HOSPITALIST

## 2021-09-12 PROCEDURE — 85027 COMPLETE CBC AUTOMATED: CPT

## 2021-09-12 PROCEDURE — 94760 N-INVAS EAR/PLS OXIMETRY 1: CPT

## 2021-09-12 PROCEDURE — 700102 HCHG RX REV CODE 250 W/ 637 OVERRIDE(OP): Performed by: STUDENT IN AN ORGANIZED HEALTH CARE EDUCATION/TRAINING PROGRAM

## 2021-09-12 PROCEDURE — 85007 BL SMEAR W/DIFF WBC COUNT: CPT

## 2021-09-12 PROCEDURE — 99223 1ST HOSP IP/OBS HIGH 75: CPT | Mod: GC | Performed by: HOSPITALIST

## 2021-09-12 PROCEDURE — A9270 NON-COVERED ITEM OR SERVICE: HCPCS | Performed by: STUDENT IN AN ORGANIZED HEALTH CARE EDUCATION/TRAINING PROGRAM

## 2021-09-12 PROCEDURE — 700102 HCHG RX REV CODE 250 W/ 637 OVERRIDE(OP): Performed by: HOSPITALIST

## 2021-09-12 PROCEDURE — 80053 COMPREHEN METABOLIC PANEL: CPT

## 2021-09-12 PROCEDURE — 94640 AIRWAY INHALATION TREATMENT: CPT

## 2021-09-12 PROCEDURE — 700102 HCHG RX REV CODE 250 W/ 637 OVERRIDE(OP): Performed by: INTERNAL MEDICINE

## 2021-09-12 PROCEDURE — 36415 COLL VENOUS BLD VENIPUNCTURE: CPT

## 2021-09-12 RX ADMIN — Medication 1000 UNITS: at 05:14

## 2021-09-12 RX ADMIN — BUDESONIDE AND FORMOTEROL FUMARATE DIHYDRATE 2 PUFF: 160; 4.5 AEROSOL RESPIRATORY (INHALATION) at 21:31

## 2021-09-12 RX ADMIN — OXYCODONE HYDROCHLORIDE AND ACETAMINOPHEN 1000 MG: 500 TABLET ORAL at 05:14

## 2021-09-12 RX ADMIN — GUAIFENESIN 1200 MG: 600 TABLET, EXTENDED RELEASE ORAL at 05:14

## 2021-09-12 RX ADMIN — DEXAMETHASONE SODIUM PHOSPHATE 6 MG: 10 INJECTION INTRAMUSCULAR; INTRAVENOUS at 05:14

## 2021-09-12 RX ADMIN — VALACYCLOVIR HYDROCHLORIDE 500 MG: 500 TABLET, FILM COATED ORAL at 05:20

## 2021-09-12 RX ADMIN — ENOXAPARIN SODIUM 40 MG: 40 INJECTION SUBCUTANEOUS at 05:15

## 2021-09-12 RX ADMIN — BARICITINIB 4 MG: 2 TABLET, FILM COATED ORAL at 18:45

## 2021-09-12 RX ADMIN — MONTELUKAST 10 MG: 10 TABLET, FILM COATED ORAL at 05:14

## 2021-09-12 RX ADMIN — BUDESONIDE AND FORMOTEROL FUMARATE DIHYDRATE 2 PUFF: 160; 4.5 AEROSOL RESPIRATORY (INHALATION) at 08:30

## 2021-09-12 RX ADMIN — AMITRIPTYLINE HYDROCHLORIDE 20 MG: 10 TABLET, FILM COATED ORAL at 21:30

## 2021-09-12 RX ADMIN — ENOXAPARIN SODIUM 40 MG: 40 INJECTION SUBCUTANEOUS at 18:45

## 2021-09-12 RX ADMIN — ACETAMINOPHEN 650 MG: 325 TABLET ORAL at 08:29

## 2021-09-12 RX ADMIN — BENZOCAINE AND MENTHOL 1 LOZENGE: 15; 3.6 LOZENGE ORAL at 11:51

## 2021-09-12 RX ADMIN — GUAIFENESIN 1200 MG: 600 TABLET, EXTENDED RELEASE ORAL at 18:45

## 2021-09-12 ASSESSMENT — ENCOUNTER SYMPTOMS
SHORTNESS OF BREATH: 1
DEPRESSION: 0
DOUBLE VISION: 0
ABDOMINAL PAIN: 0
HEADACHES: 0
BLURRED VISION: 0
PND: 0
FEVER: 0
DIARRHEA: 0
TINGLING: 0
ORTHOPNEA: 0
DIAPHORESIS: 0
NAUSEA: 0
COUGH: 0
WEAKNESS: 0
LOSS OF CONSCIOUSNESS: 0
VOMITING: 0
MYALGIAS: 0
CHILLS: 1
WHEEZING: 0
PALPITATIONS: 0
SPUTUM PRODUCTION: 0

## 2021-09-12 ASSESSMENT — FIBROSIS 4 INDEX: FIB4 SCORE: 0.82

## 2021-09-12 ASSESSMENT — PAIN DESCRIPTION - PAIN TYPE: TYPE: ACUTE PAIN

## 2021-09-12 NOTE — ASSESSMENT & PLAN NOTE
-On home desensitization therapy  Plan:  -Continue ASA 325mg BID  -Continue Fexofenadine 180mg daily

## 2021-09-12 NOTE — PROGRESS NOTES
Received report from day shift RN, Suzan. Assumed care of pt @ 1900. Pt reports no pain at this time. Updated pt on plan of care. Pt resting comfortably in bed. Fall precautions in place. Educated on use of call light. Hourly rounding and continuous monitoring in place.

## 2021-09-12 NOTE — PROGRESS NOTES
Daily Progress Note:     Date of Service: 9/12/2021  Primary Team: [employer] Purple Team   Attending: Kaushal Zhang M.D.   Senior Resident: Dr. Bowden  Intern: Dr. Grijalva  Contact:  [phone]    ID:  37 y/o F with severe COVID PNA on HFNC FiO2 100% - with Dexamethasone and Baricitinib therapy.    Subjective:  -Patient's respiratory status unchanged overnight  -She reports worsening of sore throat, and is only able to lie on her side comfortably. Chest tightness with deep breathing persists, unchanged from yesterday.  -Has not had bowel movement since admission.  -Continues to urinate without issue.     Consultants/Specialty:  None    Review of Systems:    Review of Systems   Constitutional: Positive for chills and malaise/fatigue. Negative for diaphoresis and fever.   HENT: Negative for hearing loss.    Eyes: Negative for blurred vision and double vision.   Respiratory: Positive for shortness of breath. Negative for cough, sputum production and wheezing.    Cardiovascular: Negative for palpitations, orthopnea, leg swelling and PND.        Chest tightness with deep breathing   Gastrointestinal: Negative for abdominal pain, diarrhea, nausea and vomiting.   Genitourinary: Negative for dysuria, hematuria and urgency.   Musculoskeletal: Negative for joint pain and myalgias.   Skin: Negative for rash.   Neurological: Negative for tingling, loss of consciousness, weakness and headaches.   Psychiatric/Behavioral: Negative for depression.       Objective Data:   Physical Exam:   Vitals:   Temp:  [36.1 °C (97 °F)-36.7 °C (98.1 °F)] 36.3 °C (97.4 °F)  Pulse:  [] 76  Resp:  [18-24] 20  BP: (108-126)/(55-79) 112/55  SpO2:  [85 %-96 %] 95 %    Physical Exam  Vitals and nursing note reviewed.   Constitutional:       Appearance: She is ill-appearing. She is not diaphoretic.   HENT:      Head: Normocephalic and atraumatic.      Mouth/Throat:      Mouth: Mucous membranes are dry.   Eyes:      Extraocular Movements:  Extraocular movements intact.      Conjunctiva/sclera: Conjunctivae normal.      Pupils: Pupils are equal, round, and reactive to light.   Cardiovascular:      Rate and Rhythm: Normal rate and regular rhythm.      Pulses: Normal pulses.      Heart sounds: No murmur heard.   No friction rub. No gallop.    Pulmonary:      Effort: Pulmonary effort is normal.      Breath sounds: No stridor. Rales present. No wheezing or rhonchi.      Comments: On high flow nasal cannula lying on left side  Chest:      Chest wall: No tenderness.   Abdominal:      General: Abdomen is flat. There is no distension.      Palpations: Abdomen is soft.      Tenderness: There is no abdominal tenderness. There is no guarding.   Musculoskeletal:      Cervical back: Neck supple.      Right lower leg: No edema.      Left lower leg: No edema.   Skin:     General: Skin is warm and dry.      Capillary Refill: Capillary refill takes less than 2 seconds.   Neurological:      General: No focal deficit present.      Mental Status: She is alert and oriented to person, place, and time.   Psychiatric:         Mood and Affect: Mood normal.         Labs:   Lab Results   Component Value Date/Time    WBC 5.5 09/12/2021 02:01 AM    RBC 4.69 09/12/2021 02:01 AM    HEMOGLOBIN 14.0 09/12/2021 02:01 AM    HEMATOCRIT 42.5 09/12/2021 02:01 AM    MCV 90.6 09/12/2021 02:01 AM    MCH 29.9 09/12/2021 02:01 AM    MCHC 32.9 (L) 09/12/2021 02:01 AM    MPV 10.0 09/12/2021 02:01 AM    NEUTSPOLYS 56.10 09/12/2021 02:01 AM    LYMPHOCYTES 26.30 09/12/2021 02:01 AM    MONOCYTES 17.60 (H) 09/12/2021 02:01 AM    EOSINOPHILS 0.00 09/12/2021 02:01 AM    BASOPHILS 0.00 09/12/2021 02:01 AM      Lab Results   Component Value Date/Time    SODIUM 136 09/11/2021 07:44 PM    POTASSIUM 4.5 09/11/2021 07:44 PM    CHLORIDE 100 09/11/2021 07:44 PM    CO2 25 09/11/2021 07:44 PM    GLUCOSE 159 (H) 09/11/2021 07:44 PM    BUN 13 09/11/2021 07:44 PM    CREATININE 0.55 09/11/2021 07:44 PM    CREATININE  0.7 10/01/2008 10:43 AM        Imaging:   DX-CHEST-PORTABLE (1 VIEW)   Final Result         1.  Pulmonary edema and/or infiltrates are identified, which appear somewhat increased since the prior exam.          Problem Representation:     Mrs. 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 Patient is a watcher for concerns of further decompensation requiring ETT intubation and ICU transfer.      Acute hypoxemic respiratory failure due to COVID-19 (HCC)  Assessment & Plan  -Failed home O2 monitoring program on 9/6 and was re-admitted with O2 sats in the 80s on 5L NC  -CXR: pulmonary edema and/or infiltrates, consistent with COVID pneumonia  -Negative Hepatitis B panel and Quantiferon gold  -CRP 9/11: 3.09  -No improvement with empiric trial of diuresis  Plan:  -Continue Dexamethasone (10d complete on 9/18)  -Continue Baricitinib (14d until 9/25)  -Continue DuoNeb PRN  -RT-C, continue to assess respiratory status  -Continue telemetry monitoring  -Keep patient's  Roldan Voss (DPOA) updated daily on status  -Guaifenesin ER 1200 mg for symptomatic management  -Recommended for mobilization and self proning  -Obtaining TTE  -Trend CRP q48h next draw on 9/13/21  -Discontinue Baricitinib if ALT >250; AST >225, eGFR <15; ALC I <200, ANC <1000        Asthma- (present on admission)  Assessment & Plan  -No PFT's on file  Plan:  -Continue Fluticase Furoate-Vilanterol 1 puff / day  -Continue Albuterol 2 puffs q6h PRN  -Continue Montelukast      Aspirin-exacerbated respiratory disease (AERD)  Assessment & Plan  -On home desensitization therapy  Plan:  -Holding Aspirin/Fexofenadine at this time    HSV infection  Assessment & Plan  -On  chronic suppressive therapy  Plan:  -Continue home Valacyclovir 500 mg q24h    Migraines  Assessment & Plan  -On home Amitriptyline for prophylaxis and Sumatriptan for abortive therapy  Plan:  -Continue home Amitriptyline  -Continue home Sumatriptan    Class 3 severe obesity in adult (HCC)- (present on admission)  Assessment & Plan  -BMI 43.84  Plan:  - patient on importance of lifestyle modifications including increased physical activity and diet for weight loss

## 2021-09-12 NOTE — CARE PLAN
The patient is Watcher - Medium risk of patient condition declining or worsening    Shift Goals  Clinical Goals: to maintain adequate oxygenation  Patient Goals: to get better  Family Goals: for Génesis to get better    Progress made toward(s) clinical / shift goals:  Guilherme is able to get up to bed side commode independently.      Patient is not progressing towards the following goals:  Requiring high flow to maintain pulse ox sats >90%; lungs dim with crackles; dyspnea with exertion, and desats easily with minimal movement.  Non-productive cough.  Receiving dexamethasone, lasix, and baricitinib.        Problem: Respiratory  Goal: Patient will achieve/maintain optimum respiratory ventilation and gas exchange  Outcome: Not Progressing         Problem: Mobility  Goal: Patient's capacity to carry out activities will improve  Outcome: Progressing

## 2021-09-12 NOTE — CARE PLAN
The patient is Stable - Low risk of patient condition declining or worsening    Shift Goals  Clinical Goals: to maintain adequate oxygenation  Patient Goals: to get better  Family Goals: for Shylene to get better    Progress made toward(s) clinical / shift goals:  Pt whiteboard updated on plan of care. Pt encouraged to call for assistance. Pt encouraged to voice any concerns or questions regarding care plan. Pt updated on plan of care as it develops and changes. Pt will verbalize pain using 0-10 pain scale, when to ask for pain medications, and medication information.       Problem: Pain - Standard  Goal: Alleviation of pain or a reduction in pain to the patient’s comfort goal  Outcome: Progressing     Problem: Knowledge Deficit - Standard  Goal: Patient and family/care givers will demonstrate understanding of plan of care, disease process/condition, diagnostic tests and medications  Outcome: Progressing

## 2021-09-13 ENCOUNTER — TELEPHONE (OUTPATIENT)
Dept: OTHER | Facility: MEDICAL CENTER | Age: 36
End: 2021-09-13

## 2021-09-13 ENCOUNTER — APPOINTMENT (OUTPATIENT)
Dept: CARDIOLOGY | Facility: MEDICAL CENTER | Age: 36
DRG: 177 | End: 2021-09-13
Attending: STUDENT IN AN ORGANIZED HEALTH CARE EDUCATION/TRAINING PROGRAM
Payer: COMMERCIAL

## 2021-09-13 LAB
ALBUMIN SERPL BCP-MCNC: 3.4 G/DL (ref 3.2–4.9)
ALBUMIN/GLOB SERPL: 1 G/DL
ALP SERPL-CCNC: 40 U/L (ref 30–99)
ALT SERPL-CCNC: 38 U/L (ref 2–50)
ANION GAP SERPL CALC-SCNC: 8 MMOL/L (ref 7–16)
AST SERPL-CCNC: 35 U/L (ref 12–45)
BACTERIA UR CULT: NORMAL
BASOPHILS # BLD AUTO: 0 % (ref 0–1.8)
BASOPHILS # BLD: 0 K/UL (ref 0–0.12)
BILIRUB SERPL-MCNC: 0.4 MG/DL (ref 0.1–1.5)
BUN SERPL-MCNC: 12 MG/DL (ref 8–22)
CALCIUM SERPL-MCNC: 8.6 MG/DL (ref 8.5–10.5)
CHLORIDE SERPL-SCNC: 99 MMOL/L (ref 96–112)
CO2 SERPL-SCNC: 29 MMOL/L (ref 20–33)
CREAT SERPL-MCNC: 0.63 MG/DL (ref 0.5–1.4)
CRP SERPL HS-MCNC: 0.85 MG/DL (ref 0–0.75)
EOSINOPHIL # BLD AUTO: 0 K/UL (ref 0–0.51)
EOSINOPHIL NFR BLD: 0 % (ref 0–6.9)
ERYTHROCYTE [DISTWIDTH] IN BLOOD BY AUTOMATED COUNT: 41.5 FL (ref 35.9–50)
GAMMA INTERFERON BACKGROUND BLD IA-ACNC: 0.06 IU/ML
GLOBULIN SER CALC-MCNC: 3.3 G/DL (ref 1.9–3.5)
GLUCOSE SERPL-MCNC: 113 MG/DL (ref 65–99)
HCT VFR BLD AUTO: 43.5 % (ref 37–47)
HGB BLD-MCNC: 14.4 G/DL (ref 12–16)
LV EJECT FRACT  99904: 55
LV EJECT FRACT MOD 2C 99903: 53.53
LV EJECT FRACT MOD 4C 99902: 55.82
LV EJECT FRACT MOD BP 99901: 53.2
LYMPHOCYTES # BLD AUTO: 2.43 K/UL (ref 1–4.8)
LYMPHOCYTES NFR BLD: 34.2 % (ref 22–41)
M TB IFN-G BLD-IMP: NEGATIVE
M TB IFN-G CD4+ BCKGRND COR BLD-ACNC: 0 IU/ML
MANUAL DIFF BLD: NORMAL
MCH RBC QN AUTO: 30.1 PG (ref 27–33)
MCHC RBC AUTO-ENTMCNC: 33.1 G/DL (ref 33.6–35)
MCV RBC AUTO: 90.8 FL (ref 81.4–97.8)
METAMYELOCYTES NFR BLD MANUAL: 0.9 %
MITOGEN IGNF BCKGRD COR BLD-ACNC: >10 IU/ML
MONOCYTES # BLD AUTO: 0.68 K/UL (ref 0–0.85)
MONOCYTES NFR BLD AUTO: 9.6 % (ref 0–13.4)
MORPHOLOGY BLD-IMP: NORMAL
NEUTROPHILS # BLD AUTO: 3.93 K/UL (ref 2–7.15)
NEUTROPHILS NFR BLD: 55.3 % (ref 44–72)
NRBC # BLD AUTO: 0 K/UL
NRBC BLD-RTO: 0 /100 WBC
PLATELET # BLD AUTO: 322 K/UL (ref 164–446)
PLATELET BLD QL SMEAR: NORMAL
PMV BLD AUTO: 10 FL (ref 9–12.9)
POTASSIUM SERPL-SCNC: 4.6 MMOL/L (ref 3.6–5.5)
PROT SERPL-MCNC: 6.7 G/DL (ref 6–8.2)
QFT TB2 - NIL TBQ2: -0.01 IU/ML
RBC # BLD AUTO: 4.79 M/UL (ref 4.2–5.4)
RBC BLD AUTO: NORMAL
SIGNIFICANT IND 70042: NORMAL
SITE SITE: NORMAL
SODIUM SERPL-SCNC: 136 MMOL/L (ref 135–145)
SOURCE SOURCE: NORMAL
WBC # BLD AUTO: 7.1 K/UL (ref 4.8–10.8)

## 2021-09-13 PROCEDURE — 85007 BL SMEAR W/DIFF WBC COUNT: CPT

## 2021-09-13 PROCEDURE — 36415 COLL VENOUS BLD VENIPUNCTURE: CPT

## 2021-09-13 PROCEDURE — 86140 C-REACTIVE PROTEIN: CPT

## 2021-09-13 PROCEDURE — 80053 COMPREHEN METABOLIC PANEL: CPT

## 2021-09-13 PROCEDURE — 700111 HCHG RX REV CODE 636 W/ 250 OVERRIDE (IP): Performed by: HOSPITALIST

## 2021-09-13 PROCEDURE — 85027 COMPLETE CBC AUTOMATED: CPT

## 2021-09-13 PROCEDURE — 93325 DOPPLER ECHO COLOR FLOW MAPG: CPT | Mod: 26 | Performed by: INTERNAL MEDICINE

## 2021-09-13 PROCEDURE — 94640 AIRWAY INHALATION TREATMENT: CPT

## 2021-09-13 PROCEDURE — 99233 SBSQ HOSP IP/OBS HIGH 50: CPT | Mod: GC | Performed by: HOSPITALIST

## 2021-09-13 PROCEDURE — A9270 NON-COVERED ITEM OR SERVICE: HCPCS | Performed by: INTERNAL MEDICINE

## 2021-09-13 PROCEDURE — 700102 HCHG RX REV CODE 250 W/ 637 OVERRIDE(OP): Performed by: STUDENT IN AN ORGANIZED HEALTH CARE EDUCATION/TRAINING PROGRAM

## 2021-09-13 PROCEDURE — 770020 HCHG ROOM/CARE - TELE (206)

## 2021-09-13 PROCEDURE — 700102 HCHG RX REV CODE 250 W/ 637 OVERRIDE(OP): Performed by: INTERNAL MEDICINE

## 2021-09-13 PROCEDURE — A9270 NON-COVERED ITEM OR SERVICE: HCPCS | Performed by: STUDENT IN AN ORGANIZED HEALTH CARE EDUCATION/TRAINING PROGRAM

## 2021-09-13 PROCEDURE — 93321 DOPPLER ECHO F-UP/LMTD STD: CPT | Mod: 26 | Performed by: INTERNAL MEDICINE

## 2021-09-13 PROCEDURE — 700111 HCHG RX REV CODE 636 W/ 250 OVERRIDE (IP): Performed by: STUDENT IN AN ORGANIZED HEALTH CARE EDUCATION/TRAINING PROGRAM

## 2021-09-13 PROCEDURE — 93325 DOPPLER ECHO COLOR FLOW MAPG: CPT

## 2021-09-13 PROCEDURE — 700102 HCHG RX REV CODE 250 W/ 637 OVERRIDE(OP): Performed by: HOSPITALIST

## 2021-09-13 PROCEDURE — 93308 TTE F-UP OR LMTD: CPT | Mod: 26 | Performed by: INTERNAL MEDICINE

## 2021-09-13 PROCEDURE — A9270 NON-COVERED ITEM OR SERVICE: HCPCS | Performed by: HOSPITALIST

## 2021-09-13 RX ORDER — FEXOFENADINE HCL 60 MG/1
180 TABLET, FILM COATED ORAL DAILY
Status: DISCONTINUED | OUTPATIENT
Start: 2021-09-13 | End: 2021-09-19 | Stop reason: HOSPADM

## 2021-09-13 RX ORDER — FUROSEMIDE 10 MG/ML
40 INJECTION INTRAMUSCULAR; INTRAVENOUS ONCE
Status: COMPLETED | OUTPATIENT
Start: 2021-09-13 | End: 2021-09-13

## 2021-09-13 RX ORDER — LOPERAMIDE HYDROCHLORIDE 2 MG/1
2 CAPSULE ORAL 4 TIMES DAILY PRN
Status: DISCONTINUED | OUTPATIENT
Start: 2021-09-13 | End: 2021-09-19 | Stop reason: HOSPADM

## 2021-09-13 RX ORDER — ASPIRIN 325 MG
325 TABLET ORAL 2 TIMES DAILY
Status: DISCONTINUED | OUTPATIENT
Start: 2021-09-13 | End: 2021-09-19 | Stop reason: HOSPADM

## 2021-09-13 RX ADMIN — OXYCODONE HYDROCHLORIDE AND ACETAMINOPHEN 1000 MG: 500 TABLET ORAL at 04:32

## 2021-09-13 RX ADMIN — VALACYCLOVIR HYDROCHLORIDE 500 MG: 500 TABLET, FILM COATED ORAL at 04:32

## 2021-09-13 RX ADMIN — FEXOFENADINE HCL 180 MG: 60 TABLET, FILM COATED ORAL at 12:40

## 2021-09-13 RX ADMIN — ASPIRIN 325 MG ORAL TABLET 325 MG: 325 PILL ORAL at 17:13

## 2021-09-13 RX ADMIN — ENOXAPARIN SODIUM 40 MG: 40 INJECTION SUBCUTANEOUS at 04:32

## 2021-09-13 RX ADMIN — DEXAMETHASONE SODIUM PHOSPHATE 6 MG: 10 INJECTION INTRAMUSCULAR; INTRAVENOUS at 04:33

## 2021-09-13 RX ADMIN — GUAIFENESIN 1200 MG: 600 TABLET, EXTENDED RELEASE ORAL at 04:32

## 2021-09-13 RX ADMIN — AMITRIPTYLINE HYDROCHLORIDE 20 MG: 10 TABLET, FILM COATED ORAL at 22:09

## 2021-09-13 RX ADMIN — MONTELUKAST 10 MG: 10 TABLET, FILM COATED ORAL at 04:32

## 2021-09-13 RX ADMIN — Medication 1000 UNITS: at 04:32

## 2021-09-13 RX ADMIN — ENOXAPARIN SODIUM 40 MG: 40 INJECTION SUBCUTANEOUS at 17:14

## 2021-09-13 RX ADMIN — BARICITINIB 4 MG: 2 TABLET, FILM COATED ORAL at 17:14

## 2021-09-13 RX ADMIN — FUROSEMIDE 40 MG: 10 INJECTION, SOLUTION INTRAMUSCULAR; INTRAVENOUS at 17:14

## 2021-09-13 RX ADMIN — BUDESONIDE AND FORMOTEROL FUMARATE DIHYDRATE 2 PUFF: 160; 4.5 AEROSOL RESPIRATORY (INHALATION) at 19:06

## 2021-09-13 RX ADMIN — GUAIFENESIN 1200 MG: 600 TABLET, EXTENDED RELEASE ORAL at 17:14

## 2021-09-13 ASSESSMENT — ENCOUNTER SYMPTOMS
HEADACHES: 0
DEPRESSION: 0
MYALGIAS: 0
DIAPHORESIS: 0
CHILLS: 0
DOUBLE VISION: 0
FEVER: 0
COUGH: 1
VOMITING: 0
SHORTNESS OF BREATH: 1
NAUSEA: 0
ABDOMINAL PAIN: 0
CONSTIPATION: 0
PALPITATIONS: 0
BLOOD IN STOOL: 0
DIARRHEA: 0
BLURRED VISION: 0
LOSS OF CONSCIOUSNESS: 0
WEAKNESS: 0

## 2021-09-13 ASSESSMENT — FIBROSIS 4 INDEX: FIB4 SCORE: 0.65

## 2021-09-13 NOTE — DISCHARGE PLANNING
Anticipated Discharge Disposition:   TBD    Action:   Per chart review, pt is on 40 LPM heated high flow nasal cannula.     Barriers to Discharge:   Medical clearance    Plan:   Hospital Care Management will continue to follow and assist with discharge planning needs.        Care Transition Team Assessment    This RN TONY spoke to pt via phone, verified facesheet and obtained the following information. Pt lives in a 2 story house with her  and 17 y.o. child. Pt denies use of home oxygen or other DMEs. Pt was independent with ADLs and IADLs prior to hospitalization. Pt was able to drive to appointments. Pt has insurance coverage. Pt's preferred pharmacy is [facility].             Information Source  Information Given By: Patient  Informant's Name: Guilherme Flaherty      Elopement Risk  Legal Hold: No  Ambulatory or Self Mobile in Wheelchair: No-Not an Elopement Risk  Disoriented: No  Psychiatric Symptoms: None  History of Wandering: No  Elopement this Admit: No  Vocalizing Wanting to Leave: No  Displays Behaviors, Body Language Wanting to Leave: No-Not at Risk for Elopement  Elopement Risk: Not at Risk for Elopement    Interdisciplinary Discharge Planning  Lives with - Patient's Self Care Capacity: Spouse, Child Less than 18 Years of Age  Patient or legal guardian wants to designate a caregiver: No  Support Systems: Spouse / Significant Other, Children  Housing / Facility: 2 Story House  Assistance Needed: Unknown at this Time  Durable Medical Equipment: Not Applicable    Discharge Preparedness  What is your plan after discharge?: Uncertain - pending medical team collaboration  What are your discharge supports?: Spouse, Child  Prior Functional Level: Independent with Activities of Daily Living, Independent with Medication Management, Drives Self, Ambulatory    Functional Assesment  Prior Functional Level: Independent with Activities of Daily Living, Independent with Medication Management, Drives  Self, Ambulatory    Finances  Financial Barriers to Discharge: No  Prescription Coverage: Yes    Vision / Hearing Impairment  Right Eye Vision: Wears Glasses  Left Eye Vision: Wears Glasses         Advance Directive  Advance Directive?: None  Advance Directive offered?: AD Booklet refused    Domestic Abuse  Have you ever been the victim of abuse or violence?: No  Physical Abuse or Sexual Abuse: No  Verbal Abuse or Emotional Abuse: No  Possible Abuse/Neglect Reported to:: Not Applicable    Psychological Assessment  History of Substance Abuse: None  History of Psychiatric Problems: No    Discharge Risks or Barriers  Discharge risks or barriers?: Complex medical needs  Patient risk factors: Complex medical needs    Anticipated Discharge Information  Discharge Disposition: Still a Patient (30)

## 2021-09-13 NOTE — PROGRESS NOTES
Daily Progress Note:     Date of Service: 9/13/2021  Primary Team: [facility] Purple Team   Attending: Kaushal Zhang M.D.   Senior Resident: Dr. Bowden  Intern: Dr. Grijalva  Contact:  [phone]    ID:  35 y/o F with severe COVID PNA on HFNC FiO2 85% - with Dexamethasone and Baricitinib therapy.    Subjective:  -Respiratory status down to FiO2 85%  -Patient able to tolerate sitting up in bed and subjectively feels breathing slightly improved to yesterday. Still reports significant shortness of breath with ambulation.  -No other acute complaints at this time  -Discussed and confirmed full code status.     Consultants/Specialty:  None    Review of Systems:    Review of Systems   Constitutional: Negative for chills, diaphoresis and fever.   HENT: Negative for hearing loss.    Eyes: Negative for blurred vision and double vision.   Respiratory: Positive for cough and shortness of breath.    Cardiovascular: Negative for chest pain, palpitations and leg swelling.   Gastrointestinal: Negative for abdominal pain, blood in stool, constipation, diarrhea, melena, nausea and vomiting.   Genitourinary: Negative for dysuria and hematuria.   Musculoskeletal: Negative for joint pain and myalgias.   Skin: Negative for rash.   Neurological: Negative for loss of consciousness, weakness and headaches.   Psychiatric/Behavioral: Negative for depression.       Objective Data:   Physical Exam:   Vitals:   Temp:  [35.9 °C (96.6 °F)-36.4 °C (97.5 °F)] 36.2 °C (97.2 °F)  Pulse:  [68-92] 91  Resp:  [18-22] 20  BP: ()/(53-79) 117/79  SpO2:  [90 %-97 %] 96 %    Physical Exam  Physical Exam  Vitals and nursing note reviewed.   Constitutional:       Appearance: She is ill-appearing. She is not diaphoretic.   HENT:      Head: Normocephalic and atraumatic.      Mouth/Throat:      Mouth: Mucous membranes are dry.   Eyes:      Extraocular Movements: Extraocular movements intact.      Conjunctiva/sclera: Conjunctivae normal.      Pupils: Pupils  are equal, round, and reactive to light.   Cardiovascular:      Rate and Rhythm: Normal rate and regular rhythm.      Pulses: Normal pulses.      Heart sounds: No murmur heard.   No friction rub. No gallop.    Pulmonary:      Effort: Pulmonary effort is normal.      Breath sounds: No stridor. Rales present. No wheezing or rhonchi.      Comments: On high flow nasal cannula sitting up in bed speaking in full sentences without having to stop  Chest:      Chest wall: No tenderness.   Abdominal:      General: Abdomen is flat. There is no distension.      Palpations: Abdomen is soft.      Tenderness: There is no abdominal tenderness. There is no guarding.   Musculoskeletal:      Cervical back: Neck supple.      Right lower leg: No edema.      Left lower leg: No edema.   Skin:     General: Skin is warm and dry.      Capillary Refill: Capillary refill takes less than 2 seconds.   Neurological:      General: No focal deficit present.      Mental Status: She is alert and oriented to person, place, and time.   Psychiatric:         Mood and Affect: Mood normal.     Labs:   Lab Results   Component Value Date/Time    WBC 7.1 09/13/2021 04:21 AM    RBC 4.79 09/13/2021 04:21 AM    HEMOGLOBIN 14.4 09/13/2021 04:21 AM    HEMATOCRIT 43.5 09/13/2021 04:21 AM    MCV 90.8 09/13/2021 04:21 AM    MCH 30.1 09/13/2021 04:21 AM    MCHC 33.1 (L) 09/13/2021 04:21 AM    MPV 10.0 09/13/2021 04:21 AM    NEUTSPOLYS 55.30 09/13/2021 04:21 AM    LYMPHOCYTES 34.20 09/13/2021 04:21 AM    MONOCYTES 9.60 09/13/2021 04:21 AM    EOSINOPHILS 0.00 09/13/2021 04:21 AM    BASOPHILS 0.00 09/13/2021 04:21 AM      Lab Results   Component Value Date/Time    SODIUM 136 09/13/2021 04:21 AM    POTASSIUM 4.6 09/13/2021 04:21 AM    CHLORIDE 99 09/13/2021 04:21 AM    CO2 29 09/13/2021 04:21 AM    GLUCOSE 113 (H) 09/13/2021 04:21 AM    BUN 12 09/13/2021 04:21 AM    CREATININE 0.63 09/13/2021 04:21 AM    CREATININE 0.7 10/01/2008 10:43 AM        Imaging:    DX-CHEST-PORTABLE (1 VIEW)   Final Result         1.  Pulmonary edema and/or infiltrates are identified, which appear somewhat increased since the prior exam.      EC-ECHOCARDIOGRAM LTD W/O CONT    (Results Pending)       Problem Representation:     Mrs. Flaherty is a 35 y/o F with PMH Asthma, with Aspirin-exacerbation of respiratory disease (on desensitization therapy), Migraines, recurrent HSV on chronic suppressive therapy admitted for severe COVID-19 pneumonia with acute hypoxic respiratory failure on HFNC requiring FiO2 85% on Dexamethasone and Baricitinib therapy with Full Code status. Patient is a watcher for concerns of further decompensation requiring ETT intubation and ICU transfer.      Acute hypoxemic respiratory failure due to COVID-19 ([facility])  Assessment & Plan  -Failed home O2 monitoring program on 9/6 and was re-admitted with O2 sats in the 80s on 5L NC  -CXR: pulmonary edema and/or infiltrates, consistent with COVID pneumonia  -Negative Hepatitis B panel and Quantiferon gold  -CRP 3.09 -> 0.85  -No improvement with empiric trial of diuresis  Plan:  -Continue Dexamethasone (10d complete on 9/18)  -Continue Baricitinib (14d until 9/25)  -Continue DuoNeb PRN  -RT-C, continue to assess respiratory status  -Continue telemetry monitoring  -Keep patient's  Roldan Voss (DPOA) updated daily on status  -Guaifenesin ER 1200 mg for symptomatic management  -Recommended for mobilization and self proning  -Obtaining TTE  -Trend CRP q48h next draw on 9/15/21  -Discontinue Baricitinib if ALT >250; AST >225, eGFR <15; ALC I <200, ANC <1000  -Notified charge nurse to have Rapid Response team to keep close monitoring on this patient for potential acute decompensation in setting of ongoing lack of ICU bed availability        Asthma- (present on admission)  Assessment & Plan  -No PFT's on file  Plan:  -Continue Fluticase Furoate-Vilanterol 1 puff / day  -Continue Albuterol 2 puffs q6h PRN  -Continue  Montelukast      Aspirin-exacerbated respiratory disease (AERD)  Assessment & Plan  -On home desensitization therapy  Plan:  -Continue ASA 325mg BID  -Continue Fexofenadine 180mg daily    HSV infection  Assessment & Plan  -On chronic suppressive therapy  Plan:  -Continue home Valacyclovir 500 mg q24h    Migraines  Assessment & Plan  -On home Amitriptyline for prophylaxis and Sumatriptan for abortive therapy  Plan:  -Continue home Amitriptyline  -Continue home Sumatriptan    Class 3 severe obesity in adult (HCC)- (present on admission)  Assessment & Plan  -BMI 43.84  Plan:  - patient on importance of lifestyle modifications including increased physical activity and diet for weight loss

## 2021-09-14 ENCOUNTER — APPOINTMENT (OUTPATIENT)
Dept: MEDICAL GROUP | Facility: PHYSICIAN GROUP | Age: 36
End: 2021-09-14
Payer: COMMERCIAL

## 2021-09-14 LAB
ALBUMIN SERPL BCP-MCNC: 3.8 G/DL (ref 3.2–4.9)
ALBUMIN/GLOB SERPL: 1.2 G/DL
ALP SERPL-CCNC: 44 U/L (ref 30–99)
ALT SERPL-CCNC: 86 U/L (ref 2–50)
ANION GAP SERPL CALC-SCNC: 13 MMOL/L (ref 7–16)
AST SERPL-CCNC: 59 U/L (ref 12–45)
BASOPHILS # BLD AUTO: 0 % (ref 0–1.8)
BASOPHILS # BLD: 0 K/UL (ref 0–0.12)
BILIRUB SERPL-MCNC: 0.5 MG/DL (ref 0.1–1.5)
BUN SERPL-MCNC: 13 MG/DL (ref 8–22)
CALCIUM SERPL-MCNC: 8.8 MG/DL (ref 8.5–10.5)
CHLORIDE SERPL-SCNC: 99 MMOL/L (ref 96–112)
CO2 SERPL-SCNC: 26 MMOL/L (ref 20–33)
CREAT SERPL-MCNC: 0.56 MG/DL (ref 0.5–1.4)
EOSINOPHIL # BLD AUTO: 0 K/UL (ref 0–0.51)
EOSINOPHIL NFR BLD: 0 % (ref 0–6.9)
ERYTHROCYTE [DISTWIDTH] IN BLOOD BY AUTOMATED COUNT: 40.3 FL (ref 35.9–50)
GLOBULIN SER CALC-MCNC: 3.1 G/DL (ref 1.9–3.5)
GLUCOSE SERPL-MCNC: 107 MG/DL (ref 65–99)
HCT VFR BLD AUTO: 43.5 % (ref 37–47)
HGB BLD-MCNC: 14.5 G/DL (ref 12–16)
IMM GRANULOCYTES # BLD AUTO: 0.04 K/UL (ref 0–0.11)
IMM GRANULOCYTES NFR BLD AUTO: 0.6 % (ref 0–0.9)
LYMPHOCYTES # BLD AUTO: 2.51 K/UL (ref 1–4.8)
LYMPHOCYTES NFR BLD: 40 % (ref 22–41)
MCH RBC QN AUTO: 29.8 PG (ref 27–33)
MCHC RBC AUTO-ENTMCNC: 33.3 G/DL (ref 33.6–35)
MCV RBC AUTO: 89.5 FL (ref 81.4–97.8)
MONOCYTES # BLD AUTO: 0.61 K/UL (ref 0–0.85)
MONOCYTES NFR BLD AUTO: 9.7 % (ref 0–13.4)
NEUTROPHILS # BLD AUTO: 3.12 K/UL (ref 2–7.15)
NEUTROPHILS NFR BLD: 49.7 % (ref 44–72)
NRBC # BLD AUTO: 0 K/UL
NRBC BLD-RTO: 0 /100 WBC
PLATELET # BLD AUTO: 371 K/UL (ref 164–446)
PMV BLD AUTO: 9.9 FL (ref 9–12.9)
POTASSIUM SERPL-SCNC: 4.1 MMOL/L (ref 3.6–5.5)
PROT SERPL-MCNC: 6.9 G/DL (ref 6–8.2)
RBC # BLD AUTO: 4.86 M/UL (ref 4.2–5.4)
SODIUM SERPL-SCNC: 138 MMOL/L (ref 135–145)
WBC # BLD AUTO: 6.3 K/UL (ref 4.8–10.8)

## 2021-09-14 PROCEDURE — 36415 COLL VENOUS BLD VENIPUNCTURE: CPT

## 2021-09-14 PROCEDURE — A9270 NON-COVERED ITEM OR SERVICE: HCPCS | Performed by: STUDENT IN AN ORGANIZED HEALTH CARE EDUCATION/TRAINING PROGRAM

## 2021-09-14 PROCEDURE — 700102 HCHG RX REV CODE 250 W/ 637 OVERRIDE(OP): Performed by: STUDENT IN AN ORGANIZED HEALTH CARE EDUCATION/TRAINING PROGRAM

## 2021-09-14 PROCEDURE — A9270 NON-COVERED ITEM OR SERVICE: HCPCS | Performed by: HOSPITALIST

## 2021-09-14 PROCEDURE — 85025 COMPLETE CBC W/AUTO DIFF WBC: CPT

## 2021-09-14 PROCEDURE — 94640 AIRWAY INHALATION TREATMENT: CPT

## 2021-09-14 PROCEDURE — A9270 NON-COVERED ITEM OR SERVICE: HCPCS | Performed by: INTERNAL MEDICINE

## 2021-09-14 PROCEDURE — 700102 HCHG RX REV CODE 250 W/ 637 OVERRIDE(OP): Performed by: HOSPITALIST

## 2021-09-14 PROCEDURE — 80053 COMPREHEN METABOLIC PANEL: CPT

## 2021-09-14 PROCEDURE — 94760 N-INVAS EAR/PLS OXIMETRY 1: CPT

## 2021-09-14 PROCEDURE — 770001 HCHG ROOM/CARE - MED/SURG/GYN PRIV*

## 2021-09-14 PROCEDURE — 700111 HCHG RX REV CODE 636 W/ 250 OVERRIDE (IP): Performed by: STUDENT IN AN ORGANIZED HEALTH CARE EDUCATION/TRAINING PROGRAM

## 2021-09-14 PROCEDURE — 700111 HCHG RX REV CODE 636 W/ 250 OVERRIDE (IP): Performed by: HOSPITALIST

## 2021-09-14 PROCEDURE — 99233 SBSQ HOSP IP/OBS HIGH 50: CPT | Mod: GC | Performed by: HOSPITALIST

## 2021-09-14 PROCEDURE — 700102 HCHG RX REV CODE 250 W/ 637 OVERRIDE(OP): Performed by: INTERNAL MEDICINE

## 2021-09-14 RX ORDER — FUROSEMIDE 10 MG/ML
40 INJECTION INTRAMUSCULAR; INTRAVENOUS ONCE
Status: COMPLETED | OUTPATIENT
Start: 2021-09-14 | End: 2021-09-14

## 2021-09-14 RX ORDER — BISACODYL 10 MG
10 SUPPOSITORY, RECTAL RECTAL
Status: DISCONTINUED | OUTPATIENT
Start: 2021-09-14 | End: 2021-09-19 | Stop reason: HOSPADM

## 2021-09-14 RX ORDER — POLYETHYLENE GLYCOL 3350 17 G/17G
1 POWDER, FOR SOLUTION ORAL
Status: DISCONTINUED | OUTPATIENT
Start: 2021-09-14 | End: 2021-09-19 | Stop reason: HOSPADM

## 2021-09-14 RX ORDER — AMOXICILLIN 250 MG
2 CAPSULE ORAL 2 TIMES DAILY PRN
Status: DISCONTINUED | OUTPATIENT
Start: 2021-09-14 | End: 2021-09-19 | Stop reason: HOSPADM

## 2021-09-14 RX ADMIN — VALACYCLOVIR HYDROCHLORIDE 500 MG: 500 TABLET, FILM COATED ORAL at 05:23

## 2021-09-14 RX ADMIN — ENOXAPARIN SODIUM 40 MG: 40 INJECTION SUBCUTANEOUS at 05:23

## 2021-09-14 RX ADMIN — ASPIRIN 325 MG ORAL TABLET 325 MG: 325 PILL ORAL at 17:52

## 2021-09-14 RX ADMIN — AMITRIPTYLINE HYDROCHLORIDE 20 MG: 10 TABLET, FILM COATED ORAL at 21:19

## 2021-09-14 RX ADMIN — BARICITINIB 4 MG: 2 TABLET, FILM COATED ORAL at 21:20

## 2021-09-14 RX ADMIN — ENOXAPARIN SODIUM 40 MG: 40 INJECTION SUBCUTANEOUS at 21:20

## 2021-09-14 RX ADMIN — Medication 1000 UNITS: at 05:23

## 2021-09-14 RX ADMIN — OXYCODONE HYDROCHLORIDE AND ACETAMINOPHEN 1000 MG: 500 TABLET ORAL at 05:22

## 2021-09-14 RX ADMIN — ASPIRIN 325 MG ORAL TABLET 325 MG: 325 PILL ORAL at 05:23

## 2021-09-14 RX ADMIN — MONTELUKAST 10 MG: 10 TABLET, FILM COATED ORAL at 05:22

## 2021-09-14 RX ADMIN — FUROSEMIDE 40 MG: 10 INJECTION, SOLUTION INTRAMUSCULAR; INTRAVENOUS at 13:48

## 2021-09-14 RX ADMIN — LOPERAMIDE HYDROCHLORIDE 2 MG: 2 CAPSULE ORAL at 05:22

## 2021-09-14 RX ADMIN — GUAIFENESIN 1200 MG: 600 TABLET, EXTENDED RELEASE ORAL at 17:52

## 2021-09-14 RX ADMIN — DEXAMETHASONE SODIUM PHOSPHATE 6 MG: 10 INJECTION INTRAMUSCULAR; INTRAVENOUS at 06:00

## 2021-09-14 RX ADMIN — GUAIFENESIN 1200 MG: 600 TABLET, EXTENDED RELEASE ORAL at 05:23

## 2021-09-14 RX ADMIN — FEXOFENADINE HCL 180 MG: 60 TABLET, FILM COATED ORAL at 05:22

## 2021-09-14 ASSESSMENT — ENCOUNTER SYMPTOMS
HEADACHES: 0
WEAKNESS: 0
ABDOMINAL PAIN: 0
MYALGIAS: 0
DIAPHORESIS: 0
CONSTIPATION: 0
BLOOD IN STOOL: 0
SHORTNESS OF BREATH: 1
LOSS OF CONSCIOUSNESS: 0
PALPITATIONS: 0
BLURRED VISION: 0
CHILLS: 0
DOUBLE VISION: 0
NAUSEA: 0
VOMITING: 0
FEVER: 0
DEPRESSION: 0
COUGH: 1
DIARRHEA: 0

## 2021-09-14 NOTE — CARE PLAN
Problem: Nutritional:  Goal: Achieve adequate nutritional intake  Description: Patient will consume 50-75% or > of meals  Outcome: Progressing

## 2021-09-14 NOTE — PROGRESS NOTES
Daily Progress Note:     Date of Service: 9/14/2021  Primary Team: [facility] Purple Team   Attending: Kaushal Zhang M.D.   Senior Resident: Dr. Bowden  Intern: Dr. Grijalva  Contact:  [phone]    ID:  35 y/o F with severe COVID PNA on HFNC FiO2 75%, on dexamethasone and Baricitinib therapy.    Subjective:   Patient previously on 80% FiO2 overnight, now on FiO2 75% this AM. She appears comfortable and denies ongoing dyspnea- she feels most comfortable laying on her side. She notes her sore throat is improved. She denies chest tightness. Feels guaifenesin is helping her cough. She had 2 BM which were watery yesterday and feels imodium helped- no BM so far today.     Review of Systems:    Review of Systems   Constitutional: Negative for chills, diaphoresis and fever.   HENT: Negative for hearing loss.    Eyes: Negative for blurred vision and double vision.   Respiratory: Positive for cough (improved with guafenesin) and shortness of breath (not ongoing currently in room; improved when laying on her sides).    Cardiovascular: Negative for chest pain, palpitations and leg swelling.   Gastrointestinal: Negative for abdominal pain, blood in stool, constipation, diarrhea, melena, nausea and vomiting.   Genitourinary: Negative for dysuria and hematuria.   Musculoskeletal: Negative for joint pain and myalgias.   Skin: Negative for rash.   Neurological: Negative for loss of consciousness, weakness and headaches.   Psychiatric/Behavioral: Negative for depression.     Objective Data:   Physical Exam:   Vitals:   Temp:  [36.2 °C (97.1 °F)-36.9 °C (98.5 °F)] 36.2 °C (97.1 °F)  Pulse:  [] 84  Resp:  [18-20] 18  BP: (114-125)/(64-77) 117/71  SpO2:  [87 %-94 %] 90 %    Physical Exam  Vitals and nursing note reviewed.   Constitutional:       Appearance: She is not ill-appearing or diaphoretic.   HENT:      Head: Normocephalic and atraumatic.      Mouth/Throat:      Mouth: Mucous membranes are dry.   Eyes:      Extraocular  Movements: Extraocular movements intact.      Conjunctiva/sclera: Conjunctivae normal.      Pupils: Pupils are equal, round, and reactive to light.   Cardiovascular:      Rate and Rhythm: Normal rate and regular rhythm.      Pulses: Normal pulses.      Heart sounds: No murmur heard.   No friction rub. No gallop.    Pulmonary:      Effort: Pulmonary effort is normal.      Breath sounds: No stridor. Wheezing (slight, b/l UL) present. No rhonchi or rales (not appreciated on exam today).      Comments: On high flow nasal cannula lying on left side  Chest:      Chest wall: No tenderness.   Abdominal:      General: Abdomen is flat. There is no distension.      Palpations: Abdomen is soft.      Tenderness: There is no abdominal tenderness. There is no guarding.   Musculoskeletal:      Cervical back: Neck supple.      Right lower leg: No edema.      Left lower leg: No edema.   Skin:     General: Skin is warm and dry.      Capillary Refill: Capillary refill takes less than 2 seconds.   Neurological:      General: No focal deficit present.      Mental Status: She is alert and oriented to person, place, and time.   Psychiatric:         Mood and Affect: Mood normal.       Labs:   Recent Labs     09/12/21 0201 09/13/21 0421 09/14/21 0249   WBC 5.5 7.1 6.3   RBC 4.69 4.79 4.86   HEMOGLOBIN 14.0 14.4 14.5   HEMATOCRIT 42.5 43.5 43.5   MCV 90.6 90.8 89.5   MCH 29.9 30.1 29.8   RDW 42.2 41.5 40.3   PLATELETCT 306 322 371   MPV 10.0 10.0 9.9   NEUTSPOLYS 56.10 55.30 49.70   LYMPHOCYTES 26.30 34.20 40.00   MONOCYTES 17.60* 9.60 9.70   EOSINOPHILS 0.00 0.00 0.00   BASOPHILS 0.00 0.00 0.00   RBCMORPHOLO Normal Normal  --      Recent Labs     09/12/21 0201 09/13/21 0421 09/14/21  0249   SODIUM 136 136 138   POTASSIUM 4.5 4.6 4.1   CHLORIDE 99 99 99   CO2 26 29 26   GLUCOSE 117* 113* 107*   BUN 14 12 13   CREATININE 0.58 0.63 0.56     Imaging:   ECHO (9/13): Left ventricular systolic function is normal. Left ventricular ejection  fraction is visually estimated to be 55%. No significant valve abnormalities.     Problem Representation:      Mrs. Flaherty is a 37 y/o F with PMH Asthma, with Aspirin-exacerbation of respiratory disease (on desensitization therapy), migraines, recurrent HSV on chronic suppressive therapy admitted for severe COVID-19 pneumonia with acute hypoxic respiratory failure on HFNC requiring FiO2 75% on dexamethasone and Baricitinib therapy with Full Code status. Patient is a watcher for concerns of further decompensation requiring ETT intubation and ICU transfer.      #Acute hypoxemic respiratory failure due to COVID-19   -Failed home O2 monitoring program on 9/6 and was re-admitted with O2 sats in the 80s on 5L NC. CXR (9/11) with pulmonary edema and/or infiltrates, consistent with COVID PNA. Neg Hep panel and Quantiferon gold. No improvement with empiric trial of diuresis thus far. CRP 3.09 --> .85. TTE (9/13) LV systolic function WNL and LVEF estimated to be 55%; no significant valve abnormalities.   -Repeat LASIX challenge with 40mg IV PO as accurate I&Os have not been kept with previous challenge   -Continue dexamethasone (9/9-9/18)  -Continue Baricitinib (9/11-9/25)  -Continue guaifenesin ER 1200 mg for symptomatic management as pt feels it helps her  -Continue DuoNeb PRN  -RT-C, continue to assess respiratory status  -Continue telemetry monitoring  -Keep patient's  Roldan Voss (DPOA) updated daily on status  -Continue mobilization and self proning  -Trend CRP q48h next draw on 9/15/21  -Discontinue Baricitinib if ALT >250; AST >225, eGFR <15; ALC I <200, ANC <1000  -Notified charge nurse to have Rapid Response team to keep close monitoring on this patient for potential acute decompensation in setting of ongoing lack of ICU bed availability     #Asthma  No PFT's on file. Patient's last exacerbation 2 months prior  -Continue Fluticase Furoate-Vilanterol 1 puff / day  -Continue Albuterol 2 puffs q6h PRN  -Continue  Montelukast     #Aspirin-exacerbated respiratory disease (AERD)  Has been on home desensitization therapy  -Continue ASA 325mg BID  -Continue Fexofenadine 180mg daily     #HSV infection  On chronic suppressive therapy at home   -Continue home Valacyclovir 500 mg q24h     #Migraines  On home amitriptyline for prophylaxis and sumatriptan for abortive therapy  -Continue home amitriptyline 20mg PO qD   -Continue home sumatriptan 50mg q8 hrs PRN      #Obesity, class 3   BMI 43.84  - patient on importance of lifestyle modifications including increased physical activity and diet for weight loss    Una Grijalva, DO   Internal Medicine PGY-1

## 2021-09-14 NOTE — PROGRESS NOTES
Monitor summary:      Rhythm: SR  Rate:   Ectopy:  Measurements: .14/.09/.38        12hr chart check

## 2021-09-14 NOTE — PROGRESS NOTES
Virtual Visit: New Patient   This visit was conducted via Zoom using secure and encrypted videoconferencing technology.   The patient was in a private location in the state of [place].    The patient's identity was confirmed and verbal consent was obtained for this virtual visit.    Subjective:     CC: No chief complaint on file.      Guilherme Flaherty is a 36 y.o. female presenting to [facility] care and to discuss the evaluation and management of:    ***    ROS  See HPI    Current medicines (including changes today)  No current facility-administered medications for this visit.     No current outpatient medications on file.     Facility-Administered Medications Ordered in Other Visits   Medication Dose Route Frequency Provider Last Rate Last Admin   • senna-docusate (PERICOLACE or SENOKOT S) 8.6-50 MG per tablet 2 Tablet  2 Tablet Oral BID PRN LA NENA Paulino.O.        And   • polyethylene glycol/lytes (MIRALAX) PACKET 1 Packet  1 Packet Oral QDAY PRN Una Grijalva D.O.        And   • magnesium hydroxide (MILK OF MAGNESIA) suspension 30 mL  30 mL Oral QDAY PRN Una Grijalva D.O.        And   • bisacodyl (DULCOLAX) suppository 10 mg  10 mg Rectal QDAY PRN Una Grijalva D.O.       • aspirin (ASA) tablet 325 mg  325 mg Oral BID Melvin Bowden M.D.   325 mg at 09/14/21 0523   • fexofenadine (ALLEGRA) tablet 180 mg  180 mg Oral DAILY Melvin Bowden M.D.   180 mg at 09/14/21 0522   • loperamide (IMODIUM) capsule 2 mg  2 mg Oral 4X/DAY PRN Pauline Duke M.D.   2 mg at 09/14/21 0522   • benzocaine-menthol (CEPACOL) lozenge 1 Lozenge  1 Lozenge Mouth/Throat Q2HRS PRN Melvin oBwden M.D.   1 Lozenge at 09/12/21 1151   • Respiratory Therapy Consult   Nebulization Continuous RT Kaushal Zhang M.D.       • acetaminophen (Tylenol) tablet 650 mg  650 mg Oral Q6HRS PRN Kaushal Zhang M.D.   650 mg at 09/12/21 0829   • albuterol inhaler 2 Puff  2 Puff Inhalation Q6HRS PRN (RT) Kaushal Zhang M.D.       •  amitriptyline (ELAVIL) tablet 20 mg  20 mg Oral Nightly Kaushal Zhang M.D.   20 mg at 09/13/21 2209   • ascorbic acid (Vitamin C) tablet 1,000 mg  1,000 mg Oral DAILY Kaushal Zhang M.D.   1,000 mg at 09/14/21 0522   • dexamethasone pf (DECADRON) injection 6 mg  6 mg Oral DAILY Kaushal Zhang M.D.   6 mg at 09/14/21 0600   • guaiFENesin ER (MUCINEX) tablet 1,200 mg  1,200 mg Oral Q12HRS Kaushal Zhang M.D.   1,200 mg at 09/14/21 0523   • montelukast (SINGULAIR) tablet 10 mg  10 mg Oral DAILY Kaushal Zhang M.D.   10 mg at 09/14/21 0522   • SUMAtriptan (IMITREX) tablet 50 mg  50 mg Oral Q8HRS PRN Kaushal Zhang M.D.       • valACYclovir (VALTREX) caplet 500 mg  500 mg Oral DAILY Kaushal Zhang M.D.   500 mg at 09/14/21 0523   • vitamin D3 (cholecalciferol) tablet 1,000 Units  1,000 Units Oral DAILY Kaushal Zhang M.D.   1,000 Units at 09/14/21 0523   • baricitinib (Olumiant) tablet 4 mg  4 mg Oral DAILY AT 1800 Sylvia Pemberton M.D.   4 mg at 09/13/21 1714   • budesonide-formoterol (SYMBICORT) 160-4.5 MCG/ACT inhaler 2 Puff  2 Puff Inhalation BID (RT) Kaushal Zhang M.D.   2 Puff at 09/13/21 1906   • enoxaparin (LOVENOX) inj 40 mg  40 mg Subcutaneous BID Kaushal Zhang M.D.   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    Physical Exam:***  Constitutional: Alert, no distress, well-groomed.  Skin: No rashes in visible areas.  Eye: Round. Conjunctiva clear, lids normal. No icterus.   ENMT: Lips pink without lesions, good dentition, moist mucous membranes. Phonation normal.  Neck: No masses, no thyromegaly. Moves freely without pain.  Respiratory: Unlabored respiratory effort, no cough or audible wheeze  Psych: Alert and oriented x3, normal affect and mood.     Assessment and Plan:   The following treatment plan was discussed:     There are no diagnoses linked to this encounter.    Follow-up: No follow-ups on file.

## 2021-09-14 NOTE — DIETARY
"Nutrition services: Day 3 of admit.  Guilherme Flaherty is a 36 y.o. female with admitting DX of acute hypoxemic respiratory failure.   Pt noted with poor PO intake and wt loss on nutrition admit screen.  Per department guidelines dietary staff not permitted to enter COVID isolation rooms.  Attempted to call pt without success - will follow-up as appropriate and continue to monitor; below information obtained via the EMR.     Assessment:  Height: 154.9 cm (5' 1\")  Weight: 101 kg (223 lb 12.3 oz) - via bed scale.   Body mass index is 42.28 kg/m²., BMI classification: Obese Class III.   Diet/Intake: Regular.      Evaluation:   1. Pt noted with HSV infection, asthma, migraines.  Additional PMHx reviewed at this time.  2. Pt presented with shortness of breath.  Was dx with COVID19 9/6.  No outstanding GI distress noted at this time.    3. Per chart review, pt weighed 105.2 kg 9/6 - pt with a 4% wt loss over the past week, which is considered severe.  4. Per flowsheet, pt is consuming 25-50% or 50-75% thus far.  5. +HHFNC.  6. Current clinical picture and MD progress notes reviewed.    7. No staged wounds or edema noted at this time.  8. Meds: Baricitinib, Decadron, Mucinex, Vit D3.  Additional meds and labs reviewed at this time.   9. LBM: 9/14.     Malnutrition Risk: At risk for malnutrition 2/2 4% wt loss in 1 weeks time and hypermetabolic tendencies of COVID19.  Unable to determine via ASPEN Guidelines at this time.     Recommendations/Plan:  1. Encourage intake of meals.  Continue to document % consumed under ADLs.   2. Continue to monitor weight.  3. Nutrition rep will continue to call patient for ongoing meal and snack preferences.  4. If appropriate at DC please refer to outpatient nutrition services for weight management.     RD follows.              "

## 2021-09-14 NOTE — PROGRESS NOTES
Received report from Day shift RN. Assumed care of patient at 1900. Patient A/Ox4 at this this time. On 45L O2 w/ FiO2 @ 75%, saturations between 92-97%. No acute complaints of pain or discomfort. Call light and belongings within reach. Bed in lowest locked position. Upper side rails raised. Hourly rounding in place. Will continue to monitor.           COVID-19 Surge in effect

## 2021-09-15 LAB
ALBUMIN SERPL BCP-MCNC: 3.4 G/DL (ref 3.2–4.9)
ALBUMIN/GLOB SERPL: 1.1 G/DL
ALP SERPL-CCNC: 44 U/L (ref 30–99)
ALT SERPL-CCNC: 139 U/L (ref 2–50)
ANION GAP SERPL CALC-SCNC: 10 MMOL/L (ref 7–16)
AST SERPL-CCNC: 58 U/L (ref 12–45)
BASOPHILS # BLD AUTO: 0.1 % (ref 0–1.8)
BASOPHILS # BLD: 0.01 K/UL (ref 0–0.12)
BILIRUB SERPL-MCNC: 0.5 MG/DL (ref 0.1–1.5)
BUN SERPL-MCNC: 12 MG/DL (ref 8–22)
CALCIUM SERPL-MCNC: 8.5 MG/DL (ref 8.5–10.5)
CHLORIDE SERPL-SCNC: 98 MMOL/L (ref 96–112)
CO2 SERPL-SCNC: 29 MMOL/L (ref 20–33)
CREAT SERPL-MCNC: 0.53 MG/DL (ref 0.5–1.4)
CRP SERPL HS-MCNC: <0.3 MG/DL (ref 0–0.75)
EOSINOPHIL # BLD AUTO: 0 K/UL (ref 0–0.51)
EOSINOPHIL NFR BLD: 0 % (ref 0–6.9)
ERYTHROCYTE [DISTWIDTH] IN BLOOD BY AUTOMATED COUNT: 39.6 FL (ref 35.9–50)
GLOBULIN SER CALC-MCNC: 3 G/DL (ref 1.9–3.5)
GLUCOSE SERPL-MCNC: 112 MG/DL (ref 65–99)
HCT VFR BLD AUTO: 41.7 % (ref 37–47)
HGB BLD-MCNC: 14.3 G/DL (ref 12–16)
IMM GRANULOCYTES # BLD AUTO: 0.07 K/UL (ref 0–0.11)
IMM GRANULOCYTES NFR BLD AUTO: 0.8 % (ref 0–0.9)
LYMPHOCYTES # BLD AUTO: 3.12 K/UL (ref 1–4.8)
LYMPHOCYTES NFR BLD: 35.2 % (ref 22–41)
MCH RBC QN AUTO: 30.8 PG (ref 27–33)
MCHC RBC AUTO-ENTMCNC: 34.3 G/DL (ref 33.6–35)
MCV RBC AUTO: 89.9 FL (ref 81.4–97.8)
MONOCYTES # BLD AUTO: 0.77 K/UL (ref 0–0.85)
MONOCYTES NFR BLD AUTO: 8.7 % (ref 0–13.4)
NEUTROPHILS # BLD AUTO: 4.89 K/UL (ref 2–7.15)
NEUTROPHILS NFR BLD: 55.2 % (ref 44–72)
NRBC # BLD AUTO: 0 K/UL
NRBC BLD-RTO: 0 /100 WBC
PLATELET # BLD AUTO: 373 K/UL (ref 164–446)
PMV BLD AUTO: 10.1 FL (ref 9–12.9)
POTASSIUM SERPL-SCNC: 3.8 MMOL/L (ref 3.6–5.5)
PROT SERPL-MCNC: 6.4 G/DL (ref 6–8.2)
RBC # BLD AUTO: 4.64 M/UL (ref 4.2–5.4)
SODIUM SERPL-SCNC: 137 MMOL/L (ref 135–145)
WBC # BLD AUTO: 8.9 K/UL (ref 4.8–10.8)

## 2021-09-15 PROCEDURE — A9270 NON-COVERED ITEM OR SERVICE: HCPCS | Performed by: STUDENT IN AN ORGANIZED HEALTH CARE EDUCATION/TRAINING PROGRAM

## 2021-09-15 PROCEDURE — 700102 HCHG RX REV CODE 250 W/ 637 OVERRIDE(OP): Performed by: STUDENT IN AN ORGANIZED HEALTH CARE EDUCATION/TRAINING PROGRAM

## 2021-09-15 PROCEDURE — 770001 HCHG ROOM/CARE - MED/SURG/GYN PRIV*

## 2021-09-15 PROCEDURE — 700102 HCHG RX REV CODE 250 W/ 637 OVERRIDE(OP): Performed by: HOSPITALIST

## 2021-09-15 PROCEDURE — A9270 NON-COVERED ITEM OR SERVICE: HCPCS | Performed by: INTERNAL MEDICINE

## 2021-09-15 PROCEDURE — 86140 C-REACTIVE PROTEIN: CPT

## 2021-09-15 PROCEDURE — 80053 COMPREHEN METABOLIC PANEL: CPT

## 2021-09-15 PROCEDURE — A9270 NON-COVERED ITEM OR SERVICE: HCPCS | Performed by: HOSPITALIST

## 2021-09-15 PROCEDURE — 700102 HCHG RX REV CODE 250 W/ 637 OVERRIDE(OP): Performed by: INTERNAL MEDICINE

## 2021-09-15 PROCEDURE — 94640 AIRWAY INHALATION TREATMENT: CPT

## 2021-09-15 PROCEDURE — 700111 HCHG RX REV CODE 636 W/ 250 OVERRIDE (IP): Performed by: HOSPITALIST

## 2021-09-15 PROCEDURE — 36415 COLL VENOUS BLD VENIPUNCTURE: CPT

## 2021-09-15 PROCEDURE — 700111 HCHG RX REV CODE 636 W/ 250 OVERRIDE (IP): Performed by: STUDENT IN AN ORGANIZED HEALTH CARE EDUCATION/TRAINING PROGRAM

## 2021-09-15 PROCEDURE — 99233 SBSQ HOSP IP/OBS HIGH 50: CPT | Mod: GC | Performed by: HOSPITALIST

## 2021-09-15 PROCEDURE — 85025 COMPLETE CBC W/AUTO DIFF WBC: CPT

## 2021-09-15 PROCEDURE — 94760 N-INVAS EAR/PLS OXIMETRY 1: CPT

## 2021-09-15 RX ORDER — FUROSEMIDE 10 MG/ML
40 INJECTION INTRAMUSCULAR; INTRAVENOUS ONCE
Status: DISCONTINUED | OUTPATIENT
Start: 2021-09-15 | End: 2021-09-15

## 2021-09-15 RX ORDER — FUROSEMIDE 10 MG/ML
40 INJECTION INTRAMUSCULAR; INTRAVENOUS
Status: COMPLETED | OUTPATIENT
Start: 2021-09-15 | End: 2021-09-15

## 2021-09-15 RX ADMIN — ASPIRIN 325 MG ORAL TABLET 325 MG: 325 PILL ORAL at 16:31

## 2021-09-15 RX ADMIN — DEXAMETHASONE SODIUM PHOSPHATE 6 MG: 10 INJECTION INTRAMUSCULAR; INTRAVENOUS at 06:01

## 2021-09-15 RX ADMIN — Medication 1000 UNITS: at 06:00

## 2021-09-15 RX ADMIN — GUAIFENESIN 1200 MG: 600 TABLET, EXTENDED RELEASE ORAL at 16:31

## 2021-09-15 RX ADMIN — ASPIRIN 325 MG ORAL TABLET 325 MG: 325 PILL ORAL at 06:03

## 2021-09-15 RX ADMIN — VALACYCLOVIR HYDROCHLORIDE 500 MG: 500 TABLET, FILM COATED ORAL at 06:01

## 2021-09-15 RX ADMIN — FUROSEMIDE 40 MG: 10 INJECTION, SOLUTION INTRAMUSCULAR; INTRAVENOUS at 11:29

## 2021-09-15 RX ADMIN — BARICITINIB 4 MG: 2 TABLET, FILM COATED ORAL at 16:31

## 2021-09-15 RX ADMIN — AMITRIPTYLINE HYDROCHLORIDE 20 MG: 10 TABLET, FILM COATED ORAL at 20:08

## 2021-09-15 RX ADMIN — OXYCODONE HYDROCHLORIDE AND ACETAMINOPHEN 1000 MG: 500 TABLET ORAL at 06:00

## 2021-09-15 RX ADMIN — ENOXAPARIN SODIUM 40 MG: 40 INJECTION SUBCUTANEOUS at 16:30

## 2021-09-15 RX ADMIN — ENOXAPARIN SODIUM 40 MG: 40 INJECTION SUBCUTANEOUS at 06:00

## 2021-09-15 RX ADMIN — MONTELUKAST 10 MG: 10 TABLET, FILM COATED ORAL at 06:00

## 2021-09-15 RX ADMIN — GUAIFENESIN 1200 MG: 600 TABLET, EXTENDED RELEASE ORAL at 06:00

## 2021-09-15 RX ADMIN — FEXOFENADINE HCL 180 MG: 60 TABLET, FILM COATED ORAL at 06:00

## 2021-09-15 ASSESSMENT — ENCOUNTER SYMPTOMS
NAUSEA: 0
HEADACHES: 0
VOMITING: 0
CONSTIPATION: 0
DIARRHEA: 0
ABDOMINAL PAIN: 0
FEVER: 0
DIAPHORESIS: 0
BLURRED VISION: 0
SHORTNESS OF BREATH: 1
PALPITATIONS: 0
DOUBLE VISION: 0
DEPRESSION: 0
MYALGIAS: 0
WEAKNESS: 0
BLOOD IN STOOL: 0
LOSS OF CONSCIOUSNESS: 0
COUGH: 1
CHILLS: 0

## 2021-09-15 NOTE — PROGRESS NOTES
Daily Progress Note:     Date of Service: 9/15/2021  Primary Team: [facility] Purple Team   Attending: Kaushal Zhang M.D.   Senior Resident: Dr. Bowden  Intern: Dr. Grijalva  Contact:  [phone]    ID:  35 y/o F with severe COVID PNA on HFNC FiO2 70% 40L, on dexamethasone and Baricitinib therapy.    Subjective  Patient's last BM was yesterday.  Denies diarrhea that is ongoing.  She notes that shortness of breath and sore throat and cough are all better.  She denies any repeat episodes of chest tightness.    With IV Lasix 40 mg once yesterday she had an output of 1.7 L.  She was seen resting comfortably this morning on HFNC 70% FiO2 down from 75% on 40 L.    Review of Systems:    Review of Systems   Constitutional: Negative for chills, diaphoresis and fever.   HENT: Negative for hearing loss.    Eyes: Negative for blurred vision and double vision.   Respiratory: Positive for cough (improved with guafenesin) and shortness of breath (not ongoing currently in room; improved when laying on her sides and overall).    Cardiovascular: Negative for chest pain, palpitations and leg swelling.   Gastrointestinal: Negative for abdominal pain, blood in stool, constipation, diarrhea, melena, nausea and vomiting.   Genitourinary: Negative for dysuria and hematuria.   Musculoskeletal: Negative for joint pain and myalgias.   Skin: Negative for rash.   Neurological: Negative for loss of consciousness, weakness and headaches.   Psychiatric/Behavioral: Negative for depression.     Objective Data:   Physical Exam:   Vitals:   Temp:  [36.7 °C (98 °F)-36.7 °C (98.1 °F)] 36.7 °C (98.1 °F)  Pulse:  [70-88] 70  Resp:  [18-20] 20  BP: (106)/(68-70) 106/68  SpO2:  [87 %-94 %] 94 %     Physical Exam  Vitals and nursing note reviewed.   Constitutional:       Appearance: She is not ill-appearing or diaphoretic.   HENT:      Head: Normocephalic and atraumatic.      Mouth/Throat:      Mouth: Mucous membranes are dry.   Eyes:      Extraocular  Movements: Extraocular movements intact.      Conjunctiva/sclera: Conjunctivae normal.      Pupils: Pupils are equal, round, and reactive to light.   Cardiovascular:      Rate and Rhythm: Normal rate and regular rhythm.      Pulses: Normal pulses.      Heart sounds: No murmur heard.   No friction rub. No gallop.    Pulmonary:      Effort: Pulmonary effort is normal.      Breath sounds: No stridor. No wheezing (slight, b/l UL -not appreciated today), rhonchi or rales (not appreciated on exam today).      Comments: On high flow nasal cannula sitting up  Chest:      Chest wall: No tenderness.   Abdominal:      General: Abdomen is flat. There is no distension.      Palpations: Abdomen is soft.      Tenderness: There is no abdominal tenderness. There is no guarding.   Musculoskeletal:      Cervical back: Neck supple.      Right lower leg: No edema.      Left lower leg: No edema.   Skin:     General: Skin is warm and dry.      Capillary Refill: Capillary refill takes less than 2 seconds.   Neurological:      General: No focal deficit present.      Mental Status: She is alert and oriented to person, place, and time.   Psychiatric:         Mood and Affect: Mood normal.     Labs:   Recent Labs     09/13/21  0421 09/14/21  0249 09/15/21  0443   WBC 7.1 6.3 8.9   RBC 4.79 4.86 4.64   HEMOGLOBIN 14.4 14.5 14.3   HEMATOCRIT 43.5 43.5 41.7   MCV 90.8 89.5 89.9   MCH 30.1 29.8 30.8   RDW 41.5 40.3 39.6   PLATELETCT 322 371 373   MPV 10.0 9.9 10.1   NEUTSPOLYS 55.30 49.70 55.20   LYMPHOCYTES 34.20 40.00 35.20   MONOCYTES 9.60 9.70 8.70   EOSINOPHILS 0.00 0.00 0.00   BASOPHILS 0.00 0.00 0.10   RBCMORPHOLO Normal  --   --        Assessment and Plan      Mrs. Flaherty is a 35 y/o F with PMH Asthma, with Aspirin-exacerbation of respiratory disease (on desensitization therapy), migraines, recurrent HSV on chronic suppressive therapy admitted for severe COVID-19 pneumonia with acute hypoxic respiratory failure on HFNC requiring FiO2 70%,  40 L on dexamethasone and Baricitinib therapy with Full Code status. Patient is a watcher for concerns of further decompensation requiring ETT intubation and ICU transfer.       #Acute hypoxemic respiratory failure   #COVID-19 PNA   -Failed home O2 monitoring program on 9/6 and was re-admitted with O2 sats in the 80s on 5L NC. CXR (9/11) with pulmonary edema and/or infiltrates, consistent with COVID PNA. Neg Hep panel and Quantiferon gold. No improvement with empiric trial of diuresis thus far. CRP 3.09 --> .85. TTE (9/13) LV systolic function WNL and LVEF estimated to be 55%; no significant valve abnormalities. Patient with improvement in FiO2- unclear if 2/2 LASIX challenge vs improvement in COVID-PNA  -Repeat LASIX challenge with 40mg IV once today  -Continue dexamethasone (9/9-9/18)  -Continue Baricitinib (9/11-9/25)  -Continue guaifenesin ER 1200 mg for symptomatic management as pt feels it helps her  -Continue DuoNeb PRN  -RT-C, continue to assess respiratory status  -Continue telemetry monitoring  -Keep patient's  Roldan Voss (DPOA) updated daily on status  -Continue mobilization and self proning  -Trend CRP, D-dimer q48h   -Discontinue Baricitinib if ALT >250; AST >225, eGFR <15; ALC I <200, ANC <1000  -Notified charge nurse to have Rapid Response team to keep close monitoring on this patient for potential acute decompensation in setting of ongoing lack of ICU bed availability     #Asthma  No PFT's on file. Patient's last exacerbation 2 months prior  -Continue Fluticase Furoate-Vilanterol 1 puff / day  -Continue Albuterol 2 puffs q6h PRN  -Continue Montelukast     #Aspirin-exacerbated respiratory disease (AERD)  Has been on home desensitization therapy  -Continue ASA 325mg BID  -Continue Fexofenadine 180mg daily     #HSV infection  On chronic suppressive therapy at home   -Continue home Valacyclovir 500 mg q24h     #Migraines  On home amitriptyline for prophylaxis and sumatriptan for abortive  therapy  -Continue home amitriptyline 20mg PO qD   -Continue home sumatriptan 50mg q8 hrs PRN      #Obesity, class 3   BMI 43.84  - patient on importance of lifestyle modifications including increased physical activity and diet for weight loss     Una Grijalva, DO   Internal Medicine PGY-1

## 2021-09-15 NOTE — DISCHARGE PLANNING
Anticipated Discharge Disposition:   TBD    Action:   Per chart review, pt still on 40 LPM oxygen via heated high flow nasal cannula, alert, able to mobilize with no assistance required.    Barriers to Discharge:   Medical clearance  High flow nasal cannula.    Plan:   Hospital Care Management will continue to follow and assist with discharge planning needs.

## 2021-09-16 LAB
ALBUMIN SERPL BCP-MCNC: 3.5 G/DL (ref 3.2–4.9)
ALBUMIN/GLOB SERPL: 1.3 G/DL
ALP SERPL-CCNC: 42 U/L (ref 30–99)
ALT SERPL-CCNC: 125 U/L (ref 2–50)
ANION GAP SERPL CALC-SCNC: 9 MMOL/L (ref 7–16)
AST SERPL-CCNC: 34 U/L (ref 12–45)
BACTERIA BLD CULT: NORMAL
BACTERIA BLD CULT: NORMAL
BASOPHILS # BLD AUTO: 0.1 % (ref 0–1.8)
BASOPHILS # BLD: 0.01 K/UL (ref 0–0.12)
BILIRUB SERPL-MCNC: 0.4 MG/DL (ref 0.1–1.5)
BUN SERPL-MCNC: 14 MG/DL (ref 8–22)
CALCIUM SERPL-MCNC: 8.7 MG/DL (ref 8.5–10.5)
CHLORIDE SERPL-SCNC: 96 MMOL/L (ref 96–112)
CO2 SERPL-SCNC: 32 MMOL/L (ref 20–33)
CREAT SERPL-MCNC: 0.59 MG/DL (ref 0.5–1.4)
CRP SERPL HS-MCNC: <0.3 MG/DL (ref 0–0.75)
D DIMER PPP IA.FEU-MCNC: <0.27 UG/ML (FEU) (ref 0–0.5)
EOSINOPHIL # BLD AUTO: 0 K/UL (ref 0–0.51)
EOSINOPHIL NFR BLD: 0 % (ref 0–6.9)
ERYTHROCYTE [DISTWIDTH] IN BLOOD BY AUTOMATED COUNT: 38.5 FL (ref 35.9–50)
GLOBULIN SER CALC-MCNC: 2.8 G/DL (ref 1.9–3.5)
GLUCOSE SERPL-MCNC: 113 MG/DL (ref 65–99)
HCT VFR BLD AUTO: 39.5 % (ref 37–47)
HGB BLD-MCNC: 13.5 G/DL (ref 12–16)
IMM GRANULOCYTES # BLD AUTO: 0.07 K/UL (ref 0–0.11)
IMM GRANULOCYTES NFR BLD AUTO: 0.6 % (ref 0–0.9)
LYMPHOCYTES # BLD AUTO: 2.69 K/UL (ref 1–4.8)
LYMPHOCYTES NFR BLD: 23.7 % (ref 22–41)
MCH RBC QN AUTO: 30.6 PG (ref 27–33)
MCHC RBC AUTO-ENTMCNC: 34.2 G/DL (ref 33.6–35)
MCV RBC AUTO: 89.6 FL (ref 81.4–97.8)
MONOCYTES # BLD AUTO: 1.03 K/UL (ref 0–0.85)
MONOCYTES NFR BLD AUTO: 9.1 % (ref 0–13.4)
NEUTROPHILS # BLD AUTO: 7.54 K/UL (ref 2–7.15)
NEUTROPHILS NFR BLD: 66.5 % (ref 44–72)
NRBC # BLD AUTO: 0 K/UL
NRBC BLD-RTO: 0 /100 WBC
PLATELET # BLD AUTO: 381 K/UL (ref 164–446)
PMV BLD AUTO: 10.1 FL (ref 9–12.9)
POTASSIUM SERPL-SCNC: 3.8 MMOL/L (ref 3.6–5.5)
PROT SERPL-MCNC: 6.3 G/DL (ref 6–8.2)
RBC # BLD AUTO: 4.41 M/UL (ref 4.2–5.4)
SIGNIFICANT IND 70042: NORMAL
SIGNIFICANT IND 70042: NORMAL
SITE SITE: NORMAL
SITE SITE: NORMAL
SODIUM SERPL-SCNC: 137 MMOL/L (ref 135–145)
SOURCE SOURCE: NORMAL
SOURCE SOURCE: NORMAL
WBC # BLD AUTO: 11.3 K/UL (ref 4.8–10.8)

## 2021-09-16 PROCEDURE — 94640 AIRWAY INHALATION TREATMENT: CPT

## 2021-09-16 PROCEDURE — 700102 HCHG RX REV CODE 250 W/ 637 OVERRIDE(OP): Performed by: HOSPITALIST

## 2021-09-16 PROCEDURE — A9270 NON-COVERED ITEM OR SERVICE: HCPCS | Performed by: STUDENT IN AN ORGANIZED HEALTH CARE EDUCATION/TRAINING PROGRAM

## 2021-09-16 PROCEDURE — 700102 HCHG RX REV CODE 250 W/ 637 OVERRIDE(OP): Performed by: STUDENT IN AN ORGANIZED HEALTH CARE EDUCATION/TRAINING PROGRAM

## 2021-09-16 PROCEDURE — 700102 HCHG RX REV CODE 250 W/ 637 OVERRIDE(OP): Performed by: INTERNAL MEDICINE

## 2021-09-16 PROCEDURE — 86140 C-REACTIVE PROTEIN: CPT

## 2021-09-16 PROCEDURE — 36415 COLL VENOUS BLD VENIPUNCTURE: CPT

## 2021-09-16 PROCEDURE — A9270 NON-COVERED ITEM OR SERVICE: HCPCS | Performed by: INTERNAL MEDICINE

## 2021-09-16 PROCEDURE — 85025 COMPLETE CBC W/AUTO DIFF WBC: CPT

## 2021-09-16 PROCEDURE — 700111 HCHG RX REV CODE 636 W/ 250 OVERRIDE (IP): Performed by: HOSPITALIST

## 2021-09-16 PROCEDURE — 80053 COMPREHEN METABOLIC PANEL: CPT

## 2021-09-16 PROCEDURE — A9270 NON-COVERED ITEM OR SERVICE: HCPCS | Performed by: HOSPITALIST

## 2021-09-16 PROCEDURE — 770001 HCHG ROOM/CARE - MED/SURG/GYN PRIV*

## 2021-09-16 PROCEDURE — 94760 N-INVAS EAR/PLS OXIMETRY 1: CPT

## 2021-09-16 PROCEDURE — 700111 HCHG RX REV CODE 636 W/ 250 OVERRIDE (IP): Performed by: STUDENT IN AN ORGANIZED HEALTH CARE EDUCATION/TRAINING PROGRAM

## 2021-09-16 PROCEDURE — 99232 SBSQ HOSP IP/OBS MODERATE 35: CPT | Mod: GC | Performed by: HOSPITALIST

## 2021-09-16 PROCEDURE — 85379 FIBRIN DEGRADATION QUANT: CPT

## 2021-09-16 RX ORDER — FUROSEMIDE 10 MG/ML
40 INJECTION INTRAMUSCULAR; INTRAVENOUS ONCE
Status: COMPLETED | OUTPATIENT
Start: 2021-09-16 | End: 2021-09-16

## 2021-09-16 RX ADMIN — ENOXAPARIN SODIUM 40 MG: 40 INJECTION SUBCUTANEOUS at 17:34

## 2021-09-16 RX ADMIN — DEXAMETHASONE SODIUM PHOSPHATE 6 MG: 10 INJECTION INTRAMUSCULAR; INTRAVENOUS at 04:31

## 2021-09-16 RX ADMIN — MONTELUKAST 10 MG: 10 TABLET, FILM COATED ORAL at 04:25

## 2021-09-16 RX ADMIN — BUDESONIDE AND FORMOTEROL FUMARATE DIHYDRATE 2 PUFF: 160; 4.5 AEROSOL RESPIRATORY (INHALATION) at 19:52

## 2021-09-16 RX ADMIN — OXYCODONE HYDROCHLORIDE AND ACETAMINOPHEN 1000 MG: 500 TABLET ORAL at 04:24

## 2021-09-16 RX ADMIN — FEXOFENADINE HCL 180 MG: 60 TABLET, FILM COATED ORAL at 04:25

## 2021-09-16 RX ADMIN — AMITRIPTYLINE HYDROCHLORIDE 20 MG: 10 TABLET, FILM COATED ORAL at 21:00

## 2021-09-16 RX ADMIN — GUAIFENESIN 1200 MG: 600 TABLET, EXTENDED RELEASE ORAL at 04:24

## 2021-09-16 RX ADMIN — FUROSEMIDE 40 MG: 10 INJECTION, SOLUTION INTRAMUSCULAR; INTRAVENOUS at 14:43

## 2021-09-16 RX ADMIN — ASPIRIN 325 MG ORAL TABLET 325 MG: 325 PILL ORAL at 17:34

## 2021-09-16 RX ADMIN — ASPIRIN 325 MG ORAL TABLET 325 MG: 325 PILL ORAL at 04:25

## 2021-09-16 RX ADMIN — Medication 1000 UNITS: at 04:25

## 2021-09-16 RX ADMIN — VALACYCLOVIR HYDROCHLORIDE 500 MG: 500 TABLET, FILM COATED ORAL at 04:25

## 2021-09-16 RX ADMIN — BARICITINIB 4 MG: 2 TABLET, FILM COATED ORAL at 17:34

## 2021-09-16 RX ADMIN — ENOXAPARIN SODIUM 40 MG: 40 INJECTION SUBCUTANEOUS at 04:31

## 2021-09-16 RX ADMIN — GUAIFENESIN 1200 MG: 600 TABLET, EXTENDED RELEASE ORAL at 17:34

## 2021-09-16 ASSESSMENT — ENCOUNTER SYMPTOMS
CHILLS: 0
VOMITING: 0
MYALGIAS: 0
DOUBLE VISION: 0
LOSS OF CONSCIOUSNESS: 0
SHORTNESS OF BREATH: 1
BLURRED VISION: 0
DEPRESSION: 0
PALPITATIONS: 0
ABDOMINAL PAIN: 0
FEVER: 0
HEADACHES: 0
BLOOD IN STOOL: 0
CONSTIPATION: 0
DIAPHORESIS: 0
WEAKNESS: 0
NAUSEA: 0
COUGH: 0
DIARRHEA: 0

## 2021-09-16 NOTE — PROGRESS NOTES
Daily Progress Note:     Date of Service: 9/16/2021  Primary Team: [facility]   Attending: Kaushal Zhang M.D.   Senior Resident: Dr. Bowden  Intern: Dr. Grijalva  Contact:  [phone]    Chief Complaint:   Shortness of breath     Subjective  -Resting comfortably on HHFNC FiO2 50% with SpO2 93.   -Ambulates to bathroom comfortably without feeling as short of breath  -Last BM today, no longer having diarrhea  -Sore throat and cough have nearly resolved and she continues to feel like guaifenesin helps her    Review of Systems:   Review of Systems   Constitutional: Negative for chills, diaphoresis and fever.   HENT: Negative for hearing loss.    Eyes: Negative for blurred vision and double vision.   Respiratory: Positive for shortness of breath (minimal on exertion). Negative for cough (improved with guafenesin).    Cardiovascular: Negative for chest pain, palpitations and leg swelling.   Gastrointestinal: Negative for abdominal pain, blood in stool, constipation, diarrhea, melena, nausea and vomiting.   Genitourinary: Negative for dysuria and hematuria.   Musculoskeletal: Negative for joint pain and myalgias.   Skin: Negative for rash.   Neurological: Negative for loss of consciousness, weakness and headaches.   Psychiatric/Behavioral: Negative for depression.     Objective Data:   Physical Exam:   Vitals:   Temp:  [35.9 °C (96.7 °F)-36.3 °C (97.4 °F)] 35.9 °C (96.7 °F)  Pulse:  [67-81] 79  Resp:  [16-18] 18  BP: (101-124)/(58-75) 101/65  SpO2:  [89 %-94 %] 93 %    Physical Exam  Vitals and nursing note reviewed.   Constitutional:       Appearance: She is not ill-appearing or diaphoretic.   HENT:      Head: Normocephalic and atraumatic.      Mouth/Throat:      Mouth: Mucous membranes are dry.   Eyes:      Extraocular Movements: Extraocular movements intact.      Conjunctiva/sclera: Conjunctivae normal.      Pupils: Pupils are equal, round, and reactive to light.   Cardiovascular:      Rate and Rhythm: Normal  rate and regular rhythm.      Pulses: Normal pulses.      Heart sounds: No murmur heard.   No friction rub. No gallop.    Pulmonary:      Effort: Pulmonary effort is normal.      Breath sounds: No stridor. No wheezing (slight, b/l UL -not appreciated today), rhonchi or rales (not appreciated on exam today).      Comments: On high flow nasal cannula sitting up  Chest:      Chest wall: No tenderness.   Abdominal:      General: Abdomen is flat. There is no distension.      Palpations: Abdomen is soft.      Tenderness: There is no abdominal tenderness. There is no guarding.   Musculoskeletal:      Cervical back: Neck supple.      Right lower leg: No edema.      Left lower leg: No edema.   Skin:     General: Skin is warm and dry.      Capillary Refill: Capillary refill takes less than 2 seconds.   Neurological:      General: No focal deficit present.      Mental Status: She is alert and oriented to person, place, and time.   Psychiatric:         Mood and Affect: Mood normal.     Labs:   Reviewed     Assessment and Plan      Mrs. Flaherty is a 35 y/o F with PMH asthma (no PFTs on file), with Aspirin-exacerbation of respiratory disease (ongoing desensitization therapy), migraines, recurrent HSV (on chronic suppressive therapy) admitted for acute hypoxic respiratory failure 2/2 COVID-19 pneumonia currently requiring HFNC requiring FiO2 50% on dexamethasone and Baricitinib therapy with Full Code status. Patient is a watcher for concerns of further decompensation requiring ETT intubation and ICU transfer.       #Acute hypoxemic respiratory failure   #COVID-19 PNA   -Failed home O2 monitoring program on 9/6 and was re-admitted with O2 sats in the 80s on 5L NC. CXR (9/11) with pulmonary edema and/or infiltrates, consistent with COVID PNA. Neg Hep panel and Quantiferon gold. No improvement with empiric trial of diuresis thus far. CRP 3.09 --> .85. TTE (9/13) LV systolic function WNL and LVEF estimated to be 55%; no significant  valve abnormalities. Patient with improvement in FiO2- unclear if 2/2 LASIX challenge vs improvement in COVID-PNA  -Repeat LASIX challenge with 40mg IV once today  -Continue dexamethasone (9/9-9/18)  -Continue Baricitinib (9/11-9/25)  -Continue guaifenesin ER 1200 mg for symptomatic management as pt feels it helps her  -Continue DuoNeb PRN  -RT-C, continue to assess respiratory status  -Continue telemetry monitoring  -Keep patient's  Roldan Voss (DPOA) updated daily on status  -Continue mobilization and self proning  -Trend CRP, D-dimer q48h   -Discontinue Baricitinib if ALT >250; AST >225, eGFR <15; ALC I <200, ANC <1000  -Notified charge nurse to have Rapid Response team to keep close monitoring on this patient for potential acute decompensation in setting of ongoing lack of ICU bed availability     #Asthma  No PFT's on file. Patient's last exacerbation 2 months prior  -Continue Fluticase Furoate-Vilanterol 1 puff / day  -Continue Albuterol 2 puffs q6h PRN  -Continue Montelukast     #Aspirin-exacerbated respiratory disease (AERD)  Has been on home desensitization therapy  -Continue ASA 325mg BID  -Continue Fexofenadine 180mg daily     #HSV infection  On chronic suppressive therapy at home   -Continue home Valacyclovir 500 mg q24h     #Migraines  On home amitriptyline for prophylaxis and sumatriptan for abortive therapy  -Continue home amitriptyline 20mg PO qD   -Continue home sumatriptan 50mg q8 hrs PRN      #Obesity, class 3   BMI 43.84  - patient on importance of lifestyle modifications including increased physical activity and diet for weight loss     Una Grijalva, DO   Internal Medicine PGY-1

## 2021-09-16 NOTE — CARE PLAN
The patient is Stable - Low risk of patient condition declining or worsening    Shift Goals  Clinical Goals: maintain oxygen saturations  Patient Goals: comfort  Family Goals: N/A    Progress made toward(s) clinical / shift goals:  patient on high flow, maintaining oxygen saturations above 90% at current flow    Patient is not progressing towards the following goals:

## 2021-09-17 LAB
ALBUMIN SERPL BCP-MCNC: 3.4 G/DL (ref 3.2–4.9)
ALBUMIN/GLOB SERPL: 1.2 G/DL
ALP SERPL-CCNC: 42 U/L (ref 30–99)
ALT SERPL-CCNC: 93 U/L (ref 2–50)
ANION GAP SERPL CALC-SCNC: 9 MMOL/L (ref 7–16)
AST SERPL-CCNC: 22 U/L (ref 12–45)
BASOPHILS # BLD AUTO: 0.2 % (ref 0–1.8)
BASOPHILS # BLD: 0.02 K/UL (ref 0–0.12)
BILIRUB SERPL-MCNC: 0.2 MG/DL (ref 0.1–1.5)
BUN SERPL-MCNC: 12 MG/DL (ref 8–22)
CALCIUM SERPL-MCNC: 8.5 MG/DL (ref 8.5–10.5)
CHLORIDE SERPL-SCNC: 95 MMOL/L (ref 96–112)
CO2 SERPL-SCNC: 32 MMOL/L (ref 20–33)
CREAT SERPL-MCNC: 0.64 MG/DL (ref 0.5–1.4)
EOSINOPHIL # BLD AUTO: 0 K/UL (ref 0–0.51)
EOSINOPHIL NFR BLD: 0 % (ref 0–6.9)
ERYTHROCYTE [DISTWIDTH] IN BLOOD BY AUTOMATED COUNT: 38.7 FL (ref 35.9–50)
GLOBULIN SER CALC-MCNC: 2.8 G/DL (ref 1.9–3.5)
GLUCOSE SERPL-MCNC: 114 MG/DL (ref 65–99)
HCT VFR BLD AUTO: 40.2 % (ref 37–47)
HGB BLD-MCNC: 13.7 G/DL (ref 12–16)
IMM GRANULOCYTES # BLD AUTO: 0.16 K/UL (ref 0–0.11)
IMM GRANULOCYTES NFR BLD AUTO: 1.4 % (ref 0–0.9)
LYMPHOCYTES # BLD AUTO: 3.28 K/UL (ref 1–4.8)
LYMPHOCYTES NFR BLD: 28 % (ref 22–41)
MCH RBC QN AUTO: 30.6 PG (ref 27–33)
MCHC RBC AUTO-ENTMCNC: 34.1 G/DL (ref 33.6–35)
MCV RBC AUTO: 89.7 FL (ref 81.4–97.8)
MONOCYTES # BLD AUTO: 1.13 K/UL (ref 0–0.85)
MONOCYTES NFR BLD AUTO: 9.7 % (ref 0–13.4)
NEUTROPHILS # BLD AUTO: 7.11 K/UL (ref 2–7.15)
NEUTROPHILS NFR BLD: 60.7 % (ref 44–72)
NRBC # BLD AUTO: 0 K/UL
NRBC BLD-RTO: 0 /100 WBC
PLATELET # BLD AUTO: 414 K/UL (ref 164–446)
PMV BLD AUTO: 10.1 FL (ref 9–12.9)
POTASSIUM SERPL-SCNC: 3.6 MMOL/L (ref 3.6–5.5)
PROT SERPL-MCNC: 6.2 G/DL (ref 6–8.2)
RBC # BLD AUTO: 4.48 M/UL (ref 4.2–5.4)
SODIUM SERPL-SCNC: 136 MMOL/L (ref 135–145)
WBC # BLD AUTO: 11.7 K/UL (ref 4.8–10.8)

## 2021-09-17 PROCEDURE — 700111 HCHG RX REV CODE 636 W/ 250 OVERRIDE (IP): Performed by: STUDENT IN AN ORGANIZED HEALTH CARE EDUCATION/TRAINING PROGRAM

## 2021-09-17 PROCEDURE — 85025 COMPLETE CBC W/AUTO DIFF WBC: CPT

## 2021-09-17 PROCEDURE — 36415 COLL VENOUS BLD VENIPUNCTURE: CPT

## 2021-09-17 PROCEDURE — 97165 OT EVAL LOW COMPLEX 30 MIN: CPT

## 2021-09-17 PROCEDURE — 700102 HCHG RX REV CODE 250 W/ 637 OVERRIDE(OP): Performed by: STUDENT IN AN ORGANIZED HEALTH CARE EDUCATION/TRAINING PROGRAM

## 2021-09-17 PROCEDURE — 94760 N-INVAS EAR/PLS OXIMETRY 1: CPT

## 2021-09-17 PROCEDURE — 99232 SBSQ HOSP IP/OBS MODERATE 35: CPT | Mod: GC | Performed by: HOSPITALIST

## 2021-09-17 PROCEDURE — 770001 HCHG ROOM/CARE - MED/SURG/GYN PRIV*

## 2021-09-17 PROCEDURE — 700111 HCHG RX REV CODE 636 W/ 250 OVERRIDE (IP): Performed by: HOSPITALIST

## 2021-09-17 PROCEDURE — A9270 NON-COVERED ITEM OR SERVICE: HCPCS | Performed by: STUDENT IN AN ORGANIZED HEALTH CARE EDUCATION/TRAINING PROGRAM

## 2021-09-17 PROCEDURE — A9270 NON-COVERED ITEM OR SERVICE: HCPCS | Performed by: INTERNAL MEDICINE

## 2021-09-17 PROCEDURE — 80053 COMPREHEN METABOLIC PANEL: CPT

## 2021-09-17 PROCEDURE — 97161 PT EVAL LOW COMPLEX 20 MIN: CPT

## 2021-09-17 PROCEDURE — 94640 AIRWAY INHALATION TREATMENT: CPT

## 2021-09-17 PROCEDURE — A9270 NON-COVERED ITEM OR SERVICE: HCPCS | Performed by: HOSPITALIST

## 2021-09-17 PROCEDURE — 700102 HCHG RX REV CODE 250 W/ 637 OVERRIDE(OP): Performed by: HOSPITALIST

## 2021-09-17 PROCEDURE — 700102 HCHG RX REV CODE 250 W/ 637 OVERRIDE(OP): Performed by: INTERNAL MEDICINE

## 2021-09-17 RX ORDER — FUROSEMIDE 10 MG/ML
40 INJECTION INTRAMUSCULAR; INTRAVENOUS
Status: COMPLETED | OUTPATIENT
Start: 2021-09-17 | End: 2021-09-17

## 2021-09-17 RX ORDER — VALACYCLOVIR HYDROCHLORIDE 500 MG/1
500 TABLET, FILM COATED ORAL DAILY
Status: DISCONTINUED | OUTPATIENT
Start: 2021-09-17 | End: 2021-09-19 | Stop reason: HOSPADM

## 2021-09-17 RX ADMIN — Medication 1000 UNITS: at 04:58

## 2021-09-17 RX ADMIN — FUROSEMIDE 40 MG: 10 INJECTION, SOLUTION INTRAMUSCULAR; INTRAVENOUS at 10:08

## 2021-09-17 RX ADMIN — MONTELUKAST 10 MG: 10 TABLET, FILM COATED ORAL at 04:58

## 2021-09-17 RX ADMIN — VALACYCLOVIR HYDROCHLORIDE 500 MG: 500 TABLET, FILM COATED ORAL at 15:00

## 2021-09-17 RX ADMIN — GUAIFENESIN 1200 MG: 600 TABLET, EXTENDED RELEASE ORAL at 04:57

## 2021-09-17 RX ADMIN — ENOXAPARIN SODIUM 40 MG: 40 INJECTION SUBCUTANEOUS at 17:38

## 2021-09-17 RX ADMIN — OXYCODONE HYDROCHLORIDE AND ACETAMINOPHEN 1000 MG: 500 TABLET ORAL at 04:57

## 2021-09-17 RX ADMIN — BUDESONIDE AND FORMOTEROL FUMARATE DIHYDRATE 2 PUFF: 160; 4.5 AEROSOL RESPIRATORY (INHALATION) at 10:08

## 2021-09-17 RX ADMIN — DEXAMETHASONE SODIUM PHOSPHATE 6 MG: 10 INJECTION INTRAMUSCULAR; INTRAVENOUS at 06:00

## 2021-09-17 RX ADMIN — FEXOFENADINE HCL 180 MG: 60 TABLET, FILM COATED ORAL at 04:57

## 2021-09-17 RX ADMIN — ASPIRIN 325 MG ORAL TABLET 325 MG: 325 PILL ORAL at 17:38

## 2021-09-17 RX ADMIN — ENOXAPARIN SODIUM 40 MG: 40 INJECTION SUBCUTANEOUS at 04:58

## 2021-09-17 RX ADMIN — ASPIRIN 325 MG ORAL TABLET 325 MG: 325 PILL ORAL at 04:58

## 2021-09-17 RX ADMIN — AMITRIPTYLINE HYDROCHLORIDE 20 MG: 10 TABLET, FILM COATED ORAL at 20:26

## 2021-09-17 RX ADMIN — GUAIFENESIN 1200 MG: 600 TABLET, EXTENDED RELEASE ORAL at 17:39

## 2021-09-17 RX ADMIN — BARICITINIB 4 MG: 2 TABLET, FILM COATED ORAL at 17:38

## 2021-09-17 ASSESSMENT — COGNITIVE AND FUNCTIONAL STATUS - GENERAL
DAILY ACTIVITIY SCORE: 24
SUGGESTED CMS G CODE MODIFIER MOBILITY: CH
SUGGESTED CMS G CODE MODIFIER DAILY ACTIVITY: CH
MOBILITY SCORE: 24

## 2021-09-17 ASSESSMENT — ENCOUNTER SYMPTOMS
BLURRED VISION: 0
DOUBLE VISION: 0
DIAPHORESIS: 0
SHORTNESS OF BREATH: 1
PALPITATIONS: 0
LOSS OF CONSCIOUSNESS: 0
CONSTIPATION: 0
CHILLS: 0
MYALGIAS: 0
NAUSEA: 0
VOMITING: 0
ABDOMINAL PAIN: 0
FEVER: 0
DIARRHEA: 0
COUGH: 0
BLOOD IN STOOL: 0
DEPRESSION: 0
HEADACHES: 0
WEAKNESS: 0

## 2021-09-17 ASSESSMENT — GAIT ASSESSMENTS
DISTANCE (FEET): 100
DEVIATION: NO DEVIATION
GAIT LEVEL OF ASSIST: INDEPENDENT

## 2021-09-17 ASSESSMENT — ACTIVITIES OF DAILY LIVING (ADL): TOILETING: INDEPENDENT

## 2021-09-17 NOTE — PROGRESS NOTES
Daily Progress Note     Date of Service: 9/17/2021  Primary Team: [facility]   Attending: Kaushal Zhang M.D.   Senior Resident: Dr. Bowden   Intern: Dr. Grijalva   Contact:  [phone]    Chief Complaint:   Shortness of breath     Subjective  -Seen resting comfortably on 11 L oxymask, PT/OT had just finished working with her-- no significant desat during her session with them; able to ambulate to bathroom  -Dyspnea only noted with excessive ambulation- desat to 87-88  -BM yesterday    Review of Systems:    Review of Systems   Constitutional: Negative for chills, diaphoresis and fever.   HENT: Negative for hearing loss.    Eyes: Negative for blurred vision and double vision.   Respiratory: Positive for shortness of breath (only apparent with repeated exertion). Negative for cough (improved with guafenesin).    Cardiovascular: Negative for chest pain, palpitations and leg swelling.   Gastrointestinal: Negative for abdominal pain, blood in stool, constipation, diarrhea, melena, nausea and vomiting.   Genitourinary: Negative for dysuria and hematuria.   Musculoskeletal: Negative for joint pain and myalgias.   Skin: Negative for rash.   Neurological: Negative for loss of consciousness, weakness and headaches.   Psychiatric/Behavioral: Negative for depression.     Objective Data:   Physical Exam:   Vitals:   Temp:  [35.8 °C (96.4 °F)-36.5 °C (97.7 °F)] 36.5 °C (97.7 °F)  Pulse:  [79-98] 88  Resp:  [18-20] 20  BP: (101-137)/(65-81) 113/67  SpO2:  [90 %-94 %] 90 %    Physical Exam  Vitals and nursing note reviewed.   Constitutional:       Appearance: She is not ill-appearing or diaphoretic.   HENT:      Head: Normocephalic and atraumatic.   Eyes:      Extraocular Movements: Extraocular movements intact.      Conjunctiva/sclera: Conjunctivae normal.      Pupils: Pupils are equal, round, and reactive to light.   Cardiovascular:      Rate and Rhythm: Normal rate and regular rhythm.      Pulses: Normal pulses.       Heart sounds: No murmur heard.   No friction rub. No gallop.    Pulmonary:      Effort: Pulmonary effort is normal.      Breath sounds: No stridor. No wheezing (slight, b/l UL -not appreciated today), rhonchi or rales (not appreciated on exam today).      Comments: On oxymask sitting up  Chest:      Chest wall: No tenderness.   Abdominal:      General: Abdomen is flat. There is no distension.      Palpations: Abdomen is soft.      Tenderness: There is no abdominal tenderness. There is no guarding.   Musculoskeletal:      Cervical back: Neck supple.      Right lower leg: No edema.      Left lower leg: No edema.   Skin:     General: Skin is warm and dry.      Capillary Refill: Capillary refill takes less than 2 seconds.   Neurological:      General: No focal deficit present.      Mental Status: She is alert and oriented to person, place, and time.   Psychiatric:         Mood and Affect: Mood normal.     Labs:   Reviewed in Epic     Imaging:   None new    Assessment and Plan      Mrs. Flaherty is a 35 y/o F with PMH asthma (no PFTs on file), with Aspirin-exacerbation of respiratory disease (lifelong aspirin therapy), migraines, recurrent HSV (on chronic suppressive therapy) admitted for acute hypoxic respiratory failure 2/2 COVID-19 pneumonia currently on 11L NC and on dexamethasone and Baricitinib therapy with Full Code status. Patient is a watcher for concerns of further decompensation requiring ETT intubation and ICU transfer.       #Acute hypoxemic respiratory failure   #COVID-19 PNA   -Failed home O2 monitoring program on 9/6 and was re-admitted with O2 sats in the 80s on 5L NC  -CXR (9/11) with pulmonary edema and/or infiltrate  -Neg hep panel and quantiferon gold  -TTE (9/13) LV systolic function WNL and LVEF estimated to be 55%; no significant valve abnormalities  -Patient with improvement in FiO2/ resp status- unclear if 2/2 LASIX challenge vs improvement in COVID-PNA  Plan  -Repeat LASIX challenge with 40mg IV  once today  -Continue dexamethasone (9/9-9/18)   -Continue Baricitinib (9/11-9/25 or dc at discharge)  -Continue guaifenesin ER 1200 mg for symptomatic management as pt feels it helps her  -Continue DuoNeb PRN  -RT-C, continue to assess respiratory status  -Continue mobilization and self proning  -Continue telemetry monitoring  -Keep patient's  Roldan Voss (DPOA) updated daily on status  -Discontinue Baricitinib if ALT >250; AST >225, eGFR <15; ALC I <200, ANC <1000  -Notified charge nurse to have Rapid Response team to keep close monitoring on this patient for potential acute decompensation in setting of ongoing lack of ICU bed availability     #Asthma  No PFT's on file. Last exacerbation 2 months prior  -Continue home Fluticase Furoate-Vilanterol 1 puff / day  -Continue home Albuterol 2 puffs q6h PRN  -Continue home Montelukast     #Aspirin-exacerbated respiratory disease (AERD)  -Dx in 2014. BL dec sense of smell. Has been on chronic home desensitization therapy. Hx of chronic sinusitis with polyps. Prev underwent sinus surgery with Dr. Moore with massive recurrence of polyps.   -Failed prednisone, Augmentin, and budesonide rinses  -Septoplasty with b/l sinusotomy, b/l ethmoidectomy, b/l maxillary antrostomy, and b/l sphenoidotomy all in Feb 2021.  Plan  -Continue home ASA 325mg BID -- lifelong aspirin therapy to dec resp sx and progression of polyps   -Continue home Fexofenadine 180mg daily     #HSV infection  On chronic suppressive therapy at home   -Continue home Valacyclovir 500 mg q24h     #Migraines  On home amitriptyline for prophylaxis and sumatriptan for abortive therapy  -Continue home amitriptyline 20mg PO qD   -Continue home sumatriptan 50mg q8 hrs PRN      #Obesity, class 3   BMI 43.84  - patient on importance of lifestyle modifications including increased physical activity and diet for weight loss     Una Grijalva, DO   Internal Medicine PGY-1

## 2021-09-17 NOTE — DISCHARGE PLANNING
Anticipated Discharge Disposition:   Home with home oxygen    Action:   Per chart review, pt is on 10 LPM oxygen via oxymask. PT/OT orders in place. Per OT note, no further OT need, PT pending.     Pt will need O2 eval and DME O2 order, as needed, closer to discharge.     Barriers to Discharge:   Medical clearance  PT eval and recommendations    Plan:   Hospital Care Management will continue to follow and assist with discharge planning needs.

## 2021-09-17 NOTE — THERAPY
Occupational Therapy   Initial Evaluation     Patient Name: Guilherme Flaherty  Age:  36 y.o., Sex:  female  Medical Record #: [id number]  Today's Date: 9/17/2021          Assessment  Patient is 36 y.o. female with a diagnosis of SOB COVID +.  Pt is at or near his/her functional baseline. Pt with no further skilled OT needs in the acute care setting at this time.      Plan     Occupational Therapy for Evaluation only       Discharge Recommendations: (P) Anticipate that the patient will have no further occupational therapy needs after discharge from the hospital        09/17/21 0792   Prior Living Situation   Prior Services None   Housing / Facility 2 Story House   Equipment Owned None   Lives with - Patient's Self Care Capacity Spouse;Child Less than 18 Years of Age   Prior Level of ADL Function   Self Feeding Independent   Grooming / Hygiene Independent   Bathing Independent   Dressing Independent   Toileting Independent   ADL Assessment   Grooming Independent   Upper Body Dressing Independent   Lower Body Dressing Independent   Toileting Independent   Functional Mobility   Sit to Stand Independent   Bed, Chair, Wheelchair Transfer Independent

## 2021-09-17 NOTE — THERAPY
Physical Therapy   Initial Evaluation     Patient Name: Guilherme Flaherty  Age:  36 y.o., Sex:  female  Medical Record #: [id number]  Today's Date: 9/17/2021     Precautions  Comments: (P) O2 needs 2/2 COVID    Assessment  Patient is 36 y.o. female with a diagnosis of COVID PNA.  Pt is only limited at this time by respiratory status/ needs. Demonstrated functional strength and balance to allow pt to independently mobilize and ambulate in room. Pt does have a 2 story home but is able to stay on ground floor upon DC home. Do not anticipate pt will have issues with stairs from a strength perspective, will be most limited by endurance. Discussed self pacing/energy conservation strategies which pt was receptive to. No further acute PT needs.       Plan    Patient will not be actively followed for physical therapy services at this time, however may be seen if requested by physician for 1 more visit within 30 days to address any discharge or equipment needs (particualrly as needed for stairs)    DC Equipment Recommendations: (P) None  Discharge Recommendations: (P) Anticipate that the patient will have no further physical therapy needs after discharge from the hospital       09/17/21 1357   Precautions   Comments O2 needs 2/2 COVID   Vitals   O2 (LPM) 10   O2 Delivery Device Oxymask   Pain 0 - 10 Group   Therapist Pain Assessment Nurse Notified;0   Prior Living Situation   Prior Services Home-Independent   Housing / Facility 2 Story House   Steps Into Home 1   Steps In Home   (FOS to bedroom)   Equipment Owned None   Lives with - Patient's Self Care Capacity Spouse;Child Less than 18 Years of Age  (18 y/o Son)   Comments pt reports they recently sold their home and will be moving   Prior Level of Functional Mobility   Bed Mobility Independent   Transfer Status Independent   Ambulation Independent   Distance Ambulation (Feet)   (community)   Assistive Devices Used None   Stairs Independent   Cognition    Cognition /  Consciousness WDL   Level of Consciousness Alert   Comments pleasant and very receptive to PT   Active ROM Lower Body    Active ROM Lower Body  WDL   Strength Lower Body   Lower Body Strength  WDL   Balance Assessment   Sitting Balance (Static) Good   Sitting Balance (Dynamic) Good   Standing Balance (Static) Good   Standing Balance (Dynamic) Good   Weight Shift Sitting Good   Weight Shift Standing Good   Comments w/o AD   Gait Analysis   Gait Level Of Assist Independent   Assistive Device None   Distance (Feet) 100   # of Times Distance was Traveled 1   Deviation No deviation   # of Stairs Climbed 0   Weight Bearing Status no restrictions   Comments limited to ambulation in room.    Bed Mobility    Supine to Sit Independent   Sit to Supine Independent   Scooting Independent   Functional Mobility   Sit to Stand Independent   Bed, Chair, Wheelchair Transfer Independent   Transfer Method Stand Step

## 2021-09-18 LAB
ALBUMIN SERPL BCP-MCNC: 3.4 G/DL (ref 3.2–4.9)
ALBUMIN/GLOB SERPL: 1.3 G/DL
ALP SERPL-CCNC: 42 U/L (ref 30–99)
ALT SERPL-CCNC: 74 U/L (ref 2–50)
ANION GAP SERPL CALC-SCNC: 10 MMOL/L (ref 7–16)
AST SERPL-CCNC: 24 U/L (ref 12–45)
BASOPHILS # BLD AUTO: 0.4 % (ref 0–1.8)
BASOPHILS # BLD: 0.04 K/UL (ref 0–0.12)
BILIRUB SERPL-MCNC: 0.3 MG/DL (ref 0.1–1.5)
BUN SERPL-MCNC: 12 MG/DL (ref 8–22)
CALCIUM SERPL-MCNC: 8.4 MG/DL (ref 8.5–10.5)
CHLORIDE SERPL-SCNC: 95 MMOL/L (ref 96–112)
CO2 SERPL-SCNC: 32 MMOL/L (ref 20–33)
CREAT SERPL-MCNC: 0.69 MG/DL (ref 0.5–1.4)
EOSINOPHIL # BLD AUTO: 0.28 K/UL (ref 0–0.51)
EOSINOPHIL NFR BLD: 2.6 % (ref 0–6.9)
ERYTHROCYTE [DISTWIDTH] IN BLOOD BY AUTOMATED COUNT: 40 FL (ref 35.9–50)
GLOBULIN SER CALC-MCNC: 2.7 G/DL (ref 1.9–3.5)
GLUCOSE SERPL-MCNC: 103 MG/DL (ref 65–99)
HCT VFR BLD AUTO: 39.6 % (ref 37–47)
HGB BLD-MCNC: 13.2 G/DL (ref 12–16)
IMM GRANULOCYTES # BLD AUTO: 0.11 K/UL (ref 0–0.11)
IMM GRANULOCYTES NFR BLD AUTO: 1 % (ref 0–0.9)
LYMPHOCYTES # BLD AUTO: 4.57 K/UL (ref 1–4.8)
LYMPHOCYTES NFR BLD: 43 % (ref 22–41)
MCH RBC QN AUTO: 30.1 PG (ref 27–33)
MCHC RBC AUTO-ENTMCNC: 33.3 G/DL (ref 33.6–35)
MCV RBC AUTO: 90.4 FL (ref 81.4–97.8)
MONOCYTES # BLD AUTO: 0.93 K/UL (ref 0–0.85)
MONOCYTES NFR BLD AUTO: 8.7 % (ref 0–13.4)
NEUTROPHILS # BLD AUTO: 4.71 K/UL (ref 2–7.15)
NEUTROPHILS NFR BLD: 44.3 % (ref 44–72)
NRBC # BLD AUTO: 0 K/UL
NRBC BLD-RTO: 0 /100 WBC
PLATELET # BLD AUTO: 422 K/UL (ref 164–446)
PMV BLD AUTO: 10.2 FL (ref 9–12.9)
POTASSIUM SERPL-SCNC: 3.6 MMOL/L (ref 3.6–5.5)
PROT SERPL-MCNC: 6.1 G/DL (ref 6–8.2)
RBC # BLD AUTO: 4.38 M/UL (ref 4.2–5.4)
SODIUM SERPL-SCNC: 137 MMOL/L (ref 135–145)
WBC # BLD AUTO: 10.6 K/UL (ref 4.8–10.8)

## 2021-09-18 PROCEDURE — 700102 HCHG RX REV CODE 250 W/ 637 OVERRIDE(OP): Performed by: INTERNAL MEDICINE

## 2021-09-18 PROCEDURE — 94640 AIRWAY INHALATION TREATMENT: CPT

## 2021-09-18 PROCEDURE — 85025 COMPLETE CBC W/AUTO DIFF WBC: CPT

## 2021-09-18 PROCEDURE — 700102 HCHG RX REV CODE 250 W/ 637 OVERRIDE(OP): Performed by: HOSPITALIST

## 2021-09-18 PROCEDURE — 80053 COMPREHEN METABOLIC PANEL: CPT

## 2021-09-18 PROCEDURE — 99232 SBSQ HOSP IP/OBS MODERATE 35: CPT | Mod: GC | Performed by: HOSPITALIST

## 2021-09-18 PROCEDURE — 770020 HCHG ROOM/CARE - TELE (206)

## 2021-09-18 PROCEDURE — 94760 N-INVAS EAR/PLS OXIMETRY 1: CPT

## 2021-09-18 PROCEDURE — A9270 NON-COVERED ITEM OR SERVICE: HCPCS | Performed by: STUDENT IN AN ORGANIZED HEALTH CARE EDUCATION/TRAINING PROGRAM

## 2021-09-18 PROCEDURE — 36415 COLL VENOUS BLD VENIPUNCTURE: CPT

## 2021-09-18 PROCEDURE — A9270 NON-COVERED ITEM OR SERVICE: HCPCS | Performed by: HOSPITALIST

## 2021-09-18 PROCEDURE — A9270 NON-COVERED ITEM OR SERVICE: HCPCS | Performed by: INTERNAL MEDICINE

## 2021-09-18 PROCEDURE — 700102 HCHG RX REV CODE 250 W/ 637 OVERRIDE(OP): Performed by: STUDENT IN AN ORGANIZED HEALTH CARE EDUCATION/TRAINING PROGRAM

## 2021-09-18 PROCEDURE — 700111 HCHG RX REV CODE 636 W/ 250 OVERRIDE (IP): Performed by: HOSPITALIST

## 2021-09-18 RX ADMIN — FEXOFENADINE HCL 180 MG: 60 TABLET, FILM COATED ORAL at 04:48

## 2021-09-18 RX ADMIN — GUAIFENESIN 1200 MG: 600 TABLET, EXTENDED RELEASE ORAL at 04:48

## 2021-09-18 RX ADMIN — DEXAMETHASONE SODIUM PHOSPHATE 6 MG: 10 INJECTION INTRAMUSCULAR; INTRAVENOUS at 04:49

## 2021-09-18 RX ADMIN — MONTELUKAST 10 MG: 10 TABLET, FILM COATED ORAL at 04:48

## 2021-09-18 RX ADMIN — BARICITINIB 4 MG: 2 TABLET, FILM COATED ORAL at 17:32

## 2021-09-18 RX ADMIN — ENOXAPARIN SODIUM 40 MG: 40 INJECTION SUBCUTANEOUS at 04:48

## 2021-09-18 RX ADMIN — VALACYCLOVIR HYDROCHLORIDE 500 MG: 500 TABLET, FILM COATED ORAL at 04:49

## 2021-09-18 RX ADMIN — ASPIRIN 325 MG ORAL TABLET 325 MG: 325 PILL ORAL at 17:32

## 2021-09-18 RX ADMIN — OXYCODONE HYDROCHLORIDE AND ACETAMINOPHEN 1000 MG: 500 TABLET ORAL at 04:48

## 2021-09-18 RX ADMIN — Medication 1000 UNITS: at 04:48

## 2021-09-18 RX ADMIN — AMITRIPTYLINE HYDROCHLORIDE 20 MG: 10 TABLET, FILM COATED ORAL at 22:02

## 2021-09-18 RX ADMIN — ENOXAPARIN SODIUM 40 MG: 40 INJECTION SUBCUTANEOUS at 17:32

## 2021-09-18 RX ADMIN — BUDESONIDE AND FORMOTEROL FUMARATE DIHYDRATE 2 PUFF: 160; 4.5 AEROSOL RESPIRATORY (INHALATION) at 09:05

## 2021-09-18 RX ADMIN — ASPIRIN 325 MG ORAL TABLET 325 MG: 325 PILL ORAL at 04:48

## 2021-09-18 RX ADMIN — BUDESONIDE AND FORMOTEROL FUMARATE DIHYDRATE 2 PUFF: 160; 4.5 AEROSOL RESPIRATORY (INHALATION) at 18:29

## 2021-09-18 ASSESSMENT — FIBROSIS 4 INDEX: FIB4 SCORE: 0.24

## 2021-09-18 NOTE — PROGRESS NOTES
Daily Progress Note:     Date of Service: 9/18/2021  Primary Team: [facility] Purple Team   Attending: Kaushal Zhang M.D.   Senior Resident: Dr. Bowden    Intern: Dr. Grijalva    Contact:  [phone]    Chief Complaint:   Shortness of breath     Subjective:   -Seen resting comfortably eating breakfast with 5L NC, SPO2 93-97  -Denies any ongoing complaints other than dyspnea with excessive ambulation  -Last BM this a.m.    Review of Systems:    As above    Objective Data:   Physical Exam:   Vitals:   Temp:  [36.1 °C (97 °F)-36.6 °C (97.9 °F)] 36.3 °C (97.4 °F)  Pulse:  [78-96] 78  Resp:  [16-20] 16  BP: (105-124)/(59-82) 106/59  SpO2:  [90 %-97 %] 97 %    Physical Exam  Vitals and nursing note reviewed.   Constitutional:       Appearance: She is not ill-appearing or diaphoretic.   HENT:      Head: Normocephalic and atraumatic.   Eyes:      Extraocular Movements: Extraocular movements intact.      Conjunctiva/sclera: Conjunctivae normal.      Pupils: Pupils are equal, round, and reactive to light.   Cardiovascular:      Rate and Rhythm: Normal rate and regular rhythm.      Pulses: Normal pulses.      Heart sounds: No murmur heard.   No friction rub. No gallop.    Pulmonary:      Effort: Pulmonary effort is normal.      Breath sounds: No stridor. No wheezing (slight, b/l UL -not appreciated today), rhonchi or rales (not appreciated on exam today).      Comments: On NC sitting up eating breakfast  Chest:      Chest wall: No tenderness.   Abdominal:      General: Abdomen is flat. There is no distension.      Palpations: Abdomen is soft.      Tenderness: There is no abdominal tenderness. There is no guarding.   Musculoskeletal:      Cervical back: Neck supple.      Right lower leg: No edema.      Left lower leg: No edema.   Skin:     General: Skin is warm and dry.      Capillary Refill: Capillary refill takes less than 2 seconds.   Neurological:      General: No focal deficit present.      Mental Status: She is alert and  oriented to person, place, and time.   Psychiatric:         Mood and Affect: Mood normal.     Labs:   Reviewed in Epic     Assessment and Plan     Mrs. Flaherty is a 35 y/o F with PMH asthma (no PFTs on file), with Aspirin-exacerbation of respiratory disease (lifelong aspirin therapy), migraines, recurrent HSV (on chronic suppressive therapy) admitted for acute hypoxic respiratory failure 2/2 COVID-19 pneumonia currently on 5L NC s/p 10d dexamethasone and continuing Baricitinib therapy with Full Code status. Patient is a watcher for concerns of further decompensation requiring ETT intubation and ICU transfer- likely discharge tomorrow if able to remain stable on NC.      #Acute hypoxemic respiratory failure   #COVID-19 PNA   -Failed home O2 monitoring program on 9/6 and was re-admitted with O2 sats in the 80s on 5L NC  -CXR (9/11) with pulmonary edema and/or infiltrate  -Neg hep panel and quantiferon gold  -TTE (9/13) LV systolic function WNL and LVEF estimated to be 55%; no significant valve abnormalities  -Patient with improvement in FiO2/ resp status- unclear if 2/2 LASIX challenge vs improvement in COVID-PNA  -S/p 10d dexamethasone   Plan  -Discont guaifenesin ER 1200 mg for symptomatic management as pt feels it helps her  -Continue Baricitinib (9/11-9/25 or dc at discharge)  -Continue DuoNeb PRN  -RT-C, continue to assess respiratory status  -Continue mobilization and self proning   -Continue telemetry monitoring  -Cleared by PT/OT  -Keep patient's  Roldan Voss (DPOA) updated daily on status  -Discontinue Baricitinib if ALT >250; AST >225, eGFR <15; ALC I <200, ANC <1000  -Notified charge nurse to have Rapid Response team to keep close monitoring on this patient for potential acute decompensation in setting of ongoing lack of ICU bed availability     #Asthma  No PFT's on file. Last exacerbation 2 months prior  -Continue home Fluticase Furoate-Vilanterol 1 puff / day  -Continue home Albuterol 2 puffs q6h  PRN  -Continue home Montelukast     #Aspirin-exacerbated respiratory disease (AERD)  -Dx in 2014. BL dec sense of smell. Has been on chronic home desensitization therapy. Hx of chronic sinusitis with polyps. Prev underwent sinus surgery with Dr. Moore with massive recurrence of polyps.   -Failed prednisone, Augmentin, and budesonide rinses  -Septoplasty with b/l sinusotomy, b/l ethmoidectomy, b/l maxillary antrostomy, and b/l sphenoidotomy all in Feb 2021.  Plan  -Continue home ASA 325mg BID -- lifelong aspirin therapy to dec resp sx and progression of polyps   -Continue home Fexofenadine 180mg daily     #HSV infection  On chronic suppressive therapy at home   -Continue home Valacyclovir 500 mg q24h     #Migraines  On home amitriptyline for prophylaxis and sumatriptan for abortive therapy  -Continue home amitriptyline 20mg PO qD   -Continue home sumatriptan 50mg q8 hrs PRN      #Obesity, class 3   BMI 43.84  - patient on importance of lifestyle modifications including increased physical activity and diet for weight loss     Una Grijalva, DO   Internal Medicine PGY-1

## 2021-09-18 NOTE — DISCHARGE PLANNING
Care Transition Team Discharge Planning    Anticipated Discharge Disposition:DC home pending.     Action: Chari spoke with patient about DC planning. She reported that she was a recent patient. She has her DME at home and will have spouse bring it upon being discharge.    Chari spoke with medical doctor who stated that the patient will not discharge today maybe tomorrow.    Barriers to Discharge: Awaiting medical clearance.    Plan: Chari will assist medical team with DC planning.

## 2021-09-19 VITALS
TEMPERATURE: 97.6 F | OXYGEN SATURATION: 90 % | HEIGHT: 61 IN | HEART RATE: 84 BPM | RESPIRATION RATE: 18 BRPM | BODY MASS INDEX: 43.79 KG/M2 | SYSTOLIC BLOOD PRESSURE: 136 MMHG | WEIGHT: 231.92 LBS | DIASTOLIC BLOOD PRESSURE: 79 MMHG

## 2021-09-19 LAB
ALBUMIN SERPL BCP-MCNC: 3.6 G/DL (ref 3.2–4.9)
ALBUMIN/GLOB SERPL: 1.4 G/DL
ALP SERPL-CCNC: 44 U/L (ref 30–99)
ALT SERPL-CCNC: 59 U/L (ref 2–50)
ANION GAP SERPL CALC-SCNC: 15 MMOL/L (ref 7–16)
AST SERPL-CCNC: 17 U/L (ref 12–45)
BASOPHILS # BLD AUTO: 0.2 % (ref 0–1.8)
BASOPHILS # BLD: 0.02 K/UL (ref 0–0.12)
BILIRUB SERPL-MCNC: <0.2 MG/DL (ref 0.1–1.5)
BUN SERPL-MCNC: 9 MG/DL (ref 8–22)
CALCIUM SERPL-MCNC: 8.8 MG/DL (ref 8.5–10.5)
CHLORIDE SERPL-SCNC: 95 MMOL/L (ref 96–112)
CO2 SERPL-SCNC: 27 MMOL/L (ref 20–33)
CREAT SERPL-MCNC: 0.52 MG/DL (ref 0.5–1.4)
EOSINOPHIL # BLD AUTO: 0 K/UL (ref 0–0.51)
EOSINOPHIL NFR BLD: 0 % (ref 0–6.9)
ERYTHROCYTE [DISTWIDTH] IN BLOOD BY AUTOMATED COUNT: 39.1 FL (ref 35.9–50)
GLOBULIN SER CALC-MCNC: 2.6 G/DL (ref 1.9–3.5)
GLUCOSE SERPL-MCNC: 138 MG/DL (ref 65–99)
HCT VFR BLD AUTO: 37.2 % (ref 37–47)
HGB BLD-MCNC: 12.5 G/DL (ref 12–16)
IMM GRANULOCYTES # BLD AUTO: 0.19 K/UL (ref 0–0.11)
IMM GRANULOCYTES NFR BLD AUTO: 1.6 % (ref 0–0.9)
LYMPHOCYTES # BLD AUTO: 2.35 K/UL (ref 1–4.8)
LYMPHOCYTES NFR BLD: 20.2 % (ref 22–41)
MCH RBC QN AUTO: 30.2 PG (ref 27–33)
MCHC RBC AUTO-ENTMCNC: 33.6 G/DL (ref 33.6–35)
MCV RBC AUTO: 89.9 FL (ref 81.4–97.8)
MONOCYTES # BLD AUTO: 0.95 K/UL (ref 0–0.85)
MONOCYTES NFR BLD AUTO: 8.2 % (ref 0–13.4)
NEUTROPHILS # BLD AUTO: 8.11 K/UL (ref 2–7.15)
NEUTROPHILS NFR BLD: 69.8 % (ref 44–72)
NRBC # BLD AUTO: 0 K/UL
NRBC BLD-RTO: 0 /100 WBC
PLATELET # BLD AUTO: 450 K/UL (ref 164–446)
PMV BLD AUTO: 10.1 FL (ref 9–12.9)
POTASSIUM SERPL-SCNC: 3.7 MMOL/L (ref 3.6–5.5)
PROT SERPL-MCNC: 6.2 G/DL (ref 6–8.2)
RBC # BLD AUTO: 4.14 M/UL (ref 4.2–5.4)
SODIUM SERPL-SCNC: 137 MMOL/L (ref 135–145)
WBC # BLD AUTO: 11.6 K/UL (ref 4.8–10.8)

## 2021-09-19 PROCEDURE — 700111 HCHG RX REV CODE 636 W/ 250 OVERRIDE (IP): Performed by: HOSPITALIST

## 2021-09-19 PROCEDURE — A9270 NON-COVERED ITEM OR SERVICE: HCPCS | Performed by: HOSPITALIST

## 2021-09-19 PROCEDURE — 80053 COMPREHEN METABOLIC PANEL: CPT

## 2021-09-19 PROCEDURE — 700102 HCHG RX REV CODE 250 W/ 637 OVERRIDE(OP): Performed by: HOSPITALIST

## 2021-09-19 PROCEDURE — 85025 COMPLETE CBC W/AUTO DIFF WBC: CPT

## 2021-09-19 PROCEDURE — A9270 NON-COVERED ITEM OR SERVICE: HCPCS | Performed by: STUDENT IN AN ORGANIZED HEALTH CARE EDUCATION/TRAINING PROGRAM

## 2021-09-19 PROCEDURE — 36415 COLL VENOUS BLD VENIPUNCTURE: CPT

## 2021-09-19 PROCEDURE — 700102 HCHG RX REV CODE 250 W/ 637 OVERRIDE(OP): Performed by: STUDENT IN AN ORGANIZED HEALTH CARE EDUCATION/TRAINING PROGRAM

## 2021-09-19 PROCEDURE — 99238 HOSP IP/OBS DSCHRG MGMT 30/<: CPT | Mod: GC | Performed by: HOSPITALIST

## 2021-09-19 PROCEDURE — 94760 N-INVAS EAR/PLS OXIMETRY 1: CPT

## 2021-09-19 PROCEDURE — 94640 AIRWAY INHALATION TREATMENT: CPT

## 2021-09-19 RX ORDER — VALACYCLOVIR HYDROCHLORIDE 500 MG/1
500 TABLET, FILM COATED ORAL DAILY
Qty: 40 TABLET | Refills: 0 | Status: SHIPPED | OUTPATIENT
Start: 2021-09-19

## 2021-09-19 RX ADMIN — ASPIRIN 325 MG ORAL TABLET 325 MG: 325 PILL ORAL at 06:12

## 2021-09-19 RX ADMIN — VALACYCLOVIR HYDROCHLORIDE 500 MG: 500 TABLET, FILM COATED ORAL at 06:12

## 2021-09-19 RX ADMIN — MONTELUKAST 10 MG: 10 TABLET, FILM COATED ORAL at 06:12

## 2021-09-19 RX ADMIN — ENOXAPARIN SODIUM 40 MG: 40 INJECTION SUBCUTANEOUS at 06:12

## 2021-09-19 RX ADMIN — OXYCODONE HYDROCHLORIDE AND ACETAMINOPHEN 1000 MG: 500 TABLET ORAL at 06:12

## 2021-09-19 RX ADMIN — FEXOFENADINE HCL 180 MG: 60 TABLET, FILM COATED ORAL at 06:12

## 2021-09-19 RX ADMIN — BUDESONIDE AND FORMOTEROL FUMARATE DIHYDRATE 2 PUFF: 160; 4.5 AEROSOL RESPIRATORY (INHALATION) at 06:24

## 2021-09-19 RX ADMIN — Medication 1000 UNITS: at 06:00

## 2021-09-19 NOTE — DISCHARGE PLANNING
Anticipated Discharge Disposition:   Home with home oxygen, remote home monitoring    Action:   Per chart review, pt has a discharge order, on oxygen, AAOx4, on 3 LPM  oxygen, able to mobilize with no assistance required.     RN CM spoke to pt via phone. Per pt, she is under service with [employer] and her  will bring her portable oxygen tank. Pt asked if she will be back on remote home monitoring because she was on it at home. Voalte message sent to MD, pending remote home monitoring order.     Barriers to Discharge:   Remote home monitoring set up, pending order.   Pt's  to bring her portable oxygen tank.    Plan:   [facility] Care Management will continue to follow and assist with discharge planning needs.

## 2021-09-19 NOTE — DISCHARGE SUMMARY
Discharge Summary    CHIEF COMPLAINT ON ADMISSION  Chief Complaint   Patient presents with   • Shortness of Breath     COVID POS. Home O2 monitoring called and told her to come in. PT was found to be 80% on 5L NC and increased WOB.      Reason for Admission  Shortness of breath      Admission Date  9/11/2021    CODE STATUS  Full Code    HPI & HOSPITAL COURSE    Mrs. Flaherty is a 37 y/o F with PMH asthma (no PFTs on file), with Aspirin-exacerbation of respiratory disease (lifelong aspirin therapy), migraines, recurrent HSV (on chronic suppressive therapy) admitted for acute hypoxic respiratory failure 2/2 COVID-19 pneumonia.     #Acute hypoxemic respiratory failure   #COVID-19 PNA   -Failed home O2 monitoring program on 9/6 and was re-admitted with O2 sats in the 80s on 5L NC - eventually required % FiO2 this admission   -Able to be weaned back down to NC without need for intubation  -CXR (9/11) with pulmonary edema and/or infiltrate  -TTE (9/13) LV systolic function WNL and LVEF estimated to be 55%; no significant valve abnormalities  -S/p 10d dexamethasone   -S/p 9d Baricitinib   Plan on Discharge  -Home o2 requirement 2L at rest and 3L ambulating  -Home o2 monitoring with instruction to go to urgent care/ED if worsening o2 sats     #Asthma  No PFT's on file. Last exacerbation 2 months prior  Plan on Discharge  -Continue home Fluticase Furoate-Vilanterol 1 puff / day  -Continue home Albuterol 2 puffs q6h PRN  -Continue home Montelukast     #Aspirin-exacerbated respiratory disease (AERD)  -Dx in 2014. BL dec sense of smell. Has been on chronic home desensitization therapy. Hx of chronic sinusitis with polyps. Prev underwent sinus surgery with Dr. Moore with massive recurrence of polyps.   -Failed prednisone, Augmentin, and budesonide rinses  -Septoplasty with b/l sinusotomy, b/l ethmoidectomy, b/l maxillary antrostomy, and b/l sphenoidotomy all in Feb 2021.  Plan on Discharge   -Continue home ASA 325mg BID --  lifelong aspirin therapy to dec resp sx and progression of polyps   -Continue home Fexofenadine 180mg daily     #HSV infection  On chronic suppressive therapy at home   Plan on discharge  -Continue home Valacyclovir 500 mg q24h     #Migraines  On home amitriptyline for prophylaxis and sumatriptan for abortive therapy  Plan on Discharge  -Continue home amitriptyline 20mg PO qD   -Continue home sumatriptan 50mg q8 hrs PRN      #Obesity, class 3   BMI 43.84. Counseled patient on importance of lifestyle modifications including increased physical activity and diet for weight loss this admission     Therefore, she is discharged in good and stable condition to home with close outpatient follow-up.    The patient met 2-midnight criteria for an inpatient stay at the time of discharge.     Physical Exam  Vitals and nursing note reviewed.   Constitutional:       Appearance: She is not ill-appearing or diaphoretic.   HENT:      Head: Normocephalic and atraumatic.   Eyes:      Extraocular Movements: Extraocular movements intact.      Conjunctiva/sclera: Conjunctivae normal.      Pupils: Pupils are equal, round, and reactive to light.   Cardiovascular:      Rate and Rhythm: Normal rate and regular rhythm.      Pulses: Normal pulses.      Heart sounds: No murmur heard.   No friction rub. No gallop.    Pulmonary:      Effort: Pulmonary effort is normal.      Breath sounds: No stridor. No wheezing (slight, b/l UL -not appreciated today), rhonchi or rales (not appreciated on exam today).      Comments: On NC sitting up eating breakfast  Chest:      Chest wall: No tenderness.   Abdominal:      General: Abdomen is flat. There is no distension.      Palpations: Abdomen is soft.      Tenderness: There is no abdominal tenderness. There is no guarding.   Musculoskeletal:      Cervical back: Neck supple.      Right lower leg: No edema.      Left lower leg: No edema.   Skin:     General: Skin is warm and dry.      Capillary Refill: Capillary  refill takes less than 2 seconds.   Neurological:      General: No focal deficit present.      Mental Status: She is alert and oriented to person, place, and time.   Psychiatric:         Mood and Affect: Mood normal.     Discharge Date  9/19/2021    FOLLOW UP ITEMS POST DISCHARGE  FU with Dr. Grijalva outpatient [facility] in 2 weeks   2L o2 at rest, 3L ambulatory -- monitor o2 sats    DISCHARGE DIAGNOSES  Principal Problem:    Acute hypoxemic respiratory failure due to COVID-19 ([facility]) POA: Unknown  Active Problems:    Class 3 severe obesity in adult ([facility]) POA: Yes    Asthma POA: Yes    Migraines POA: Unknown    HSV infection POA: Unknown    Aspirin-exacerbated respiratory disease (AERD) POA: Unknown  Resolved Problems:    Acute hypoxic respiratory failure secondary to COVID pneumonia POA: Yes    Hypokalemia POA: Yes    Samter's triad POA: Unknown    FOLLOW UP  Future Appointments   Date Time Provider Department Center   10/1/2021  7:20 AM Phil Stinson M.D. [facility] None     [facility] INTERNAL MEDICINE  [address] [place] [id number] [phone]  Call in 2 weeks  [phone]    MEDICATIONS ON DISCHARGE     Medication List      CHANGE how you take these medications      Instructions   * valACYclovir 500 MG Tabs  What changed: Another medication with the same name was added. Make sure you understand how and when to take each.  Commonly known as: VALTREX   Take 500 mg by mouth every day.  Dose: 500 mg     * valACYclovir 500 MG Tabs  What changed: You were already taking a medication with the same name, and this prescription was added. Make sure you understand how and when to take each.  Commonly known as: VALTREX   Take 1 Tablet by mouth every day.  Dose: 500 mg         * This list has 2 medication(s) that are the same as other medications prescribed for you. Read the directions carefully, and ask your doctor or other care provider to review them with you.            CONTINUE taking these medications       Instructions   albuterol 108 (90 Base) MCG/ACT Aers inhalation aerosol   Inhale 2 Puffs every 6 hours as needed for Shortness of Breath.  Dose: 2 Puff     amitriptyline 10 MG Tabs  Commonly known as: ELAVIL   Take 20 mg by mouth every evening.  Dose: 20 mg     aspirin 325 MG Tabs  Commonly known as: ASA   Take 325 mg by mouth 2 times a day.  Dose: 325 mg     dexamethasone 6 MG Tabs  Commonly known as: DECADRON   Take 6 mg by mouth every day. For 9 days  Dose: 6 mg     fexofenadine 180 MG tablet  Commonly known as: ALLEGRA   Take 180 mg by mouth every day.  Dose: 180 mg     Fluticasone Furoate-Vilanterol 100-25 MCG/INH Aepb  Commonly known as: BREO   Inhale 1 Puff every day.  Dose: 1 Puff     montelukast 10 MG Tabs  Commonly known as: SINGULAIR   Take 10 mg by mouth every day.  Dose: 10 mg     SUMAtriptan 50 MG Tabs  Commonly known as: IMITREX   Take  mg at the onset of aura/HA; may re-dose x1 after 2 hrs if HA persists; MDD: 200 mg; do not use >2 days/week.     Vitamin C 1000 MG Tabs   Take 1,000 mg by mouth every day.  Dose: 1,000 mg     VITAMIN D PO   Take 1 Tablet by mouth every day.  Dose: 1 Tablet     ZINC PO   Take 1 Tablet by mouth every day.  Dose: 1 Tablet        STOP taking these medications    Guaifenesin 1200 MG Tb12          Allergies  Allergies   Allergen Reactions   • Aspirin      Patient states de-sensitized. Currently taking ASA 325mg BID   • Ibuprofen Anaphylaxis and Rash     Patient states de-sensitized   • Ibuprofen      Throat closes, throat itches, runny nose    • Sulfa Drugs      Airway closes.   • Asa [Aspirin] Runny Nose   • Latex Rash   • Latex      Rash and itching   • Sulfa Drugs Shortness of Breath   • Tape      Blister at the site.       DIET  Orders Placed This Encounter   Procedures   • Diet Order Diet: Regular     Standing Status:   Standing     Number of Occurrences:   1     Order Specific Question:   Diet:     Answer:   Regular [1]       ACTIVITY  As tolerated.  Weight  bearing as tolerated    CONSULTATIONS  Intensivist     LABORATORY  Lab Results   Component Value Date    SODIUM 137 09/19/2021    POTASSIUM 3.7 09/19/2021    CHLORIDE 95 (L) 09/19/2021    CO2 27 09/19/2021    GLUCOSE 138 (H) 09/19/2021    BUN 9 09/19/2021    CREATININE 0.52 09/19/2021    CREATININE 0.7 10/01/2008        Lab Results   Component Value Date    WBC 11.6 (H) 09/19/2021    HEMOGLOBIN 12.5 09/19/2021    HEMATOCRIT 37.2 09/19/2021    PLATELETCT 450 (H) 09/19/2021

## 2021-09-19 NOTE — DISCHARGE INSTRUCTIONS
COVID-19  COVID-19 is a respiratory infection that is caused by a virus called severe acute respiratory syndrome coronavirus 2 (SARS-CoV-2). The disease is also known as coronavirus disease or novel coronavirus. In some people, the virus may not cause any symptoms. In others, it may cause a serious infection. The infection can get worse quickly and can lead to complications, such as:  · Pneumonia, or infection of the lungs.  · Acute respiratory distress syndrome or ARDS. This is fluid build-up in the lungs.  · Acute respiratory failure. This is a condition in which there is not enough oxygen passing from the lungs to the body.  · Sepsis or septic shock. This is a serious bodily reaction to an infection.  · Blood clotting problems.  · Secondary infections due to bacteria or fungus.  The virus that causes COVID-19 is contagious. This means that it can spread from person to person through droplets from coughs and sneezes (respiratory secretions).  What are the causes?  This illness is caused by a virus. You may catch the virus by:  · Breathing in droplets from an infected person's cough or sneeze.  · Touching something, like a table or a doorknob, that was exposed to the virus (contaminated) and then touching your mouth, nose, or eyes.  What increases the risk?  Risk for infection  You are more likely to be infected with this virus if you:  · Live in or travel to an area with a COVID-19 outbreak.  · Come in contact with a sick person who recently traveled to an area with a COVID-19 outbreak.  · Provide care for or live with a person who is infected with COVID-19.  Risk for serious illness  You are more likely to become seriously ill from the virus if you:  · Are 65 years of age or older.  · Have a long-term disease that lowers your body's ability to fight infection (immunocompromised).  · Live in a nursing home or long-term care facility.  · Have a long-term (chronic) disease such as:  ? Chronic lung disease, including  chronic obstructive pulmonary disease or asthma  ? Heart disease.  ? Diabetes.  ? Chronic kidney disease.  ? Liver disease.  · Are obese.  What are the signs or symptoms?  Symptoms of this condition can range from mild to severe. Symptoms may appear any time from 2 to 14 days after being exposed to the virus. They include:  · A fever.  · A cough.  · Difficulty breathing.  · Chills.  · Muscle pains.  · A sore throat.  · Loss of taste or smell.  Some people may also have stomach problems, such as nausea, vomiting, or diarrhea.  Other people may not have any symptoms of COVID-19.  How is this diagnosed?  This condition may be diagnosed based on:  · Your signs and symptoms, especially if:  ? You live in an area with a COVID-19 outbreak.  ? You recently traveled to or from an area where the virus is common.  ? You provide care for or live with a person who was diagnosed with COVID-19.  · A physical exam.  · Lab tests, which may include:  ? A nasal swab to take a sample of fluid from your nose.  ? A throat swab to take a sample of fluid from your throat.  ? A sample of mucus from your lungs (sputum).  ? Blood tests.  · Imaging tests, which may include, X-rays, CT scan, or ultrasound.  How is this treated?  At present, there is no medicine to treat COVID-19. Medicines that treat other diseases are being used on a trial basis to see if they are effective against COVID-19.  Your health care provider will talk with you about ways to treat your symptoms. For most people, the infection is mild and can be managed at home with rest, fluids, and over-the-counter medicines.  Treatment for a serious infection usually takes places in a hospital intensive care unit (ICU). It may include one or more of the following treatments. These treatments are given until your symptoms improve.  · Receiving fluids and medicines through an IV.  · Supplemental oxygen. Extra oxygen is given through a tube in the nose, a face mask, or a  grady.  · Positioning you to lie on your stomach (prone position). This makes it easier for oxygen to get into the lungs.  · Continuous positive airway pressure (CPAP) or bi-level positive airway pressure (BPAP) machine. This treatment uses mild air pressure to keep the airways open. A tube that is connected to a motor delivers oxygen to the body.  · Ventilator. This treatment moves air into and out of the lungs by using a tube that is placed in your windpipe.  · Tracheostomy. This is a procedure to create a hole in the neck so that a breathing tube can be inserted.  · Extracorporeal membrane oxygenation (ECMO). This procedure gives the lungs a chance to recover by taking over the functions of the heart and lungs. It supplies oxygen to the body and removes carbon dioxide.  Follow these instructions at home:  Lifestyle  · If you are sick, stay home except to get medical care. Your health care provider will tell you how long to stay home. Call your health care provider before you go for medical care.  · Rest at home as told by your health care provider.  · Do not use any products that contain nicotine or tobacco, such as cigarettes, e-cigarettes, and chewing tobacco. If you need help quitting, ask your health care provider.  · Return to your normal activities as told by your health care provider. Ask your health care provider what activities are safe for you.  General instructions  · Take over-the-counter and prescription medicines only as told by your health care provider.  · Drink enough fluid to keep your urine pale yellow.  · Keep all follow-up visits as told by your health care provider. This is important.  How is this prevented?    There is no vaccine to help prevent COVID-19 infection. However, there are steps you can take to protect yourself and others from this virus.  To protect yourself:   · Do not travel to areas where COVID-19 is a risk. The areas where COVID-19 is reported change often. To identify  high-risk areas and travel restrictions, check the [facility] travel website: wwwnc.cdc.gov/travel/notices  · If you live in, or must travel to, an area where COVID-19 is a risk, take precautions to avoid infection.  ? Stay away from people who are sick.  ? Wash your hands often with soap and water for 20 seconds. If soap and water are not available, use an alcohol-based hand .  ? Avoid touching your mouth, face, eyes, or nose.  ? Avoid going out in public, follow guidance from your state and local health authorities.  ? If you must go out in public, wear a cloth face covering or face mask.  ? Disinfect objects and surfaces that are frequently touched every day. This may include:  § Counters and tables.  § Doorknobs and light switches.  § Sinks and faucets.  § Electronics, such as phones, remote controls, keyboards, computers, and tablets.  To protect others:  If you have symptoms of COVID-19, take steps to prevent the virus from spreading to others.  · If you think you have a COVID-19 infection, contact your health care provider right away. Tell your health care team that you think you may have a COVID-19 infection.  · Stay home. Leave your house only to seek medical care. Do not use public transport.  · Do not travel while you are sick.  · Wash your hands often with soap and water for 20 seconds. If soap and water are not available, use alcohol-based hand .  · Stay away from other members of your household. Let healthy household members care for children and pets, if possible. If you have to care for children or pets, wash your hands often and wear a mask. If possible, stay in your own room, separate from others. Use a different bathroom.  · Make sure that all people in your household wash their hands well and often.  · Cough or sneeze into a tissue or your sleeve or elbow. Do not cough or sneeze into your hand or into the air.  · Wear a cloth face covering or face mask.  Where to find more  information  · Centers for Disease Control and Prevention: www.cdc.gov/coronavirus/2019-ncov/index.html  · World Health Organization: www.who.int/health-topics/coronavirus  Contact a health care provider if:  · You live in or have traveled to an area where COVID-19 is a risk and you have symptoms of the infection.  · You have had contact with someone who has COVID-19 and you have symptoms of the infection.  Get help right away if:  · You have trouble breathing.  · You have pain or pressure in your chest.  · You have confusion.  · You have bluish lips and fingernails.  · You have difficulty waking from sleep.  · You have symptoms that get worse.  These symptoms may represent a serious problem that is an emergency. Do not wait to see if the symptoms will go away. Get medical help right away. Call your local emergency services (911 in the [place] Do not drive yourself to the hospital. Let the emergency medical personnel know if you think you have COVID-19.  Summary  · COVID-19 is a respiratory infection that is caused by a virus. It is also known as coronavirus disease or novel coronavirus. It can cause serious infections, such as pneumonia, acute respiratory distress syndrome, acute respiratory failure, or sepsis.  · The virus that causes COVID-19 is contagious. This means that it can spread from person to person through droplets from coughs and sneezes.  · You are more likely to develop a serious illness if you are 65 years of age or older, have a weak immunity, live in a nursing home, or have chronic disease.  · There is no medicine to treat COVID-19. Your health care provider will talk with you about ways to treat your symptoms.  · Take steps to protect yourself and others from infection. Wash your hands often and disinfect objects and surfaces that are frequently touched every day. Stay away from people who are sick and wear a mask if you are sick.  This information is not intended to replace advice given to you by  your health care provider. Make sure you discuss any questions you have with your health care provider.  Document Released: 01/23/2020 Document Revised: 05/14/2020 Document Reviewed: 01/23/2020  Elsevier Patient Education © 2020 [employer]    Discharge Instructions    Discharged to home by car with relative. Discharged via wheelchair, hospital escort: Yes.  Special equipment needed: Oxygen    Be sure to schedule a follow-up appointment with your primary care doctor or any specialists as instructed.     Discharge Plan:   Diet Plan: Discussed  Activity Level: Discussed  Confirmed Follow up Appointment: Patient to Call and Schedule Appointment  Confirmed Symptoms Management: Discussed  Medication Reconciliation Updated: Yes    I understand that a diet low in cholesterol, fat, and sodium is recommended for good health. Unless I have been given specific instructions below for another diet, I accept this instruction as my diet prescription.   Other diet: Regular    Special Instructions: None    · Is patient discharged on Warfarin / Coumadin?   No     Depression / Suicide Risk    As you are discharged from this [facility] facility, it is important to learn how to keep safe from harming yourself.    Recognize the warning signs:  · Abrupt changes in personality, positive or negative- including increase in energy   · Giving away possessions  · Change in eating patterns- significant weight changes-  positive or negative  · Change in sleeping patterns- unable to sleep or sleeping all the time   · Unwillingness or inability to communicate  · Depression  · Unusual sadness, discouragement and loneliness  · Talk of wanting to die  · Neglect of personal appearance   · Rebelliousness- reckless behavior  · Withdrawal from people/activities they love  · Confusion- inability to concentrate     If you or a loved one observes any of these behaviors or has concerns about self-harm, here's what you can do:  · Talk about it- your  feelings and reasons for harming yourself  · Remove any means that you might use to hurt yourself (examples: pills, rope, extension cords, firearm)  · Get professional help from the community (Mental Health, Substance Abuse, psychological counseling)  · Do not be alone:Call your Safe Contact- someone whom you trust who will be there for you.  · Call your local CRISIS HOTLINE 157-2624 or 562-772-3307  · Call your local Children's Mobile Crisis Response Team [place] (152) 471-5376 or www.[facility]  · Call the toll free National Suicide Prevention Hotlines   · National Suicide Prevention Lifeline 664-794-XJPH (3158)  · National Hope Line Network 800-SUICIDE (394-6988)

## 2021-10-04 ENCOUNTER — OFFICE VISIT (OUTPATIENT)
Dept: INTERNAL MEDICINE | Facility: OTHER | Age: 36
End: 2021-10-04
Payer: COMMERCIAL

## 2021-10-04 VITALS
HEART RATE: 120 BPM | DIASTOLIC BLOOD PRESSURE: 92 MMHG | BODY MASS INDEX: 42.29 KG/M2 | SYSTOLIC BLOOD PRESSURE: 141 MMHG | TEMPERATURE: 99.1 F | OXYGEN SATURATION: 90 % | HEIGHT: 62 IN | WEIGHT: 229.8 LBS

## 2021-10-04 DIAGNOSIS — B00.9 HSV INFECTION: ICD-10-CM

## 2021-10-04 DIAGNOSIS — U07.1 PNEUMONIA DUE TO COVID-19 VIRUS: ICD-10-CM

## 2021-10-04 DIAGNOSIS — E66.01 CLASS 3 SEVERE OBESITY DUE TO EXCESS CALORIES IN ADULT, UNSPECIFIED BMI, UNSPECIFIED WHETHER SERIOUS COMORBIDITY PRESENT (HCC): ICD-10-CM

## 2021-10-04 DIAGNOSIS — Z86.79 HISTORY OF HIGH BLOOD PRESSURE: ICD-10-CM

## 2021-10-04 DIAGNOSIS — Z88.6 ASPIRIN-EXACERBATED RESPIRATORY DISEASE (AERD): ICD-10-CM

## 2021-10-04 DIAGNOSIS — J45.909 ASPIRIN-EXACERBATED RESPIRATORY DISEASE (AERD): ICD-10-CM

## 2021-10-04 DIAGNOSIS — J33.9 ASPIRIN-EXACERBATED RESPIRATORY DISEASE (AERD): ICD-10-CM

## 2021-10-04 DIAGNOSIS — G43.109 MIGRAINE WITH AURA AND WITHOUT STATUS MIGRAINOSUS, NOT INTRACTABLE: ICD-10-CM

## 2021-10-04 DIAGNOSIS — J12.82 PNEUMONIA DUE TO COVID-19 VIRUS: ICD-10-CM

## 2021-10-04 DIAGNOSIS — J45.909 MODERATE ASTHMA WITHOUT COMPLICATION, UNSPECIFIED WHETHER PERSISTENT: ICD-10-CM

## 2021-10-04 PROBLEM — I10 HIGH BLOOD PRESSURE: Status: ACTIVE | Noted: 2021-10-04

## 2021-10-04 PROCEDURE — 99204 OFFICE O/P NEW MOD 45 MIN: CPT | Mod: GC | Performed by: INTERNAL MEDICINE

## 2021-10-04 SDOH — ECONOMIC STABILITY: HOUSING INSECURITY
IN THE LAST 12 MONTHS, WAS THERE A TIME WHEN YOU DID NOT HAVE A STEADY PLACE TO SLEEP OR SLEPT IN A SHELTER (INCLUDING NOW)?: NO

## 2021-10-04 SDOH — ECONOMIC STABILITY: TRANSPORTATION INSECURITY
IN THE PAST 12 MONTHS, HAS LACK OF RELIABLE TRANSPORTATION KEPT YOU FROM MEDICAL APPOINTMENTS, MEETINGS, WORK OR FROM GETTING THINGS NEEDED FOR DAILY LIVING?: NO

## 2021-10-04 SDOH — ECONOMIC STABILITY: FOOD INSECURITY: WITHIN THE PAST 12 MONTHS, YOU WORRIED THAT YOUR FOOD WOULD RUN OUT BEFORE YOU GOT MONEY TO BUY MORE.: NEVER TRUE

## 2021-10-04 SDOH — ECONOMIC STABILITY: TRANSPORTATION INSECURITY
IN THE PAST 12 MONTHS, HAS THE LACK OF TRANSPORTATION KEPT YOU FROM MEDICAL APPOINTMENTS OR FROM GETTING MEDICATIONS?: NO

## 2021-10-04 SDOH — HEALTH STABILITY: PHYSICAL HEALTH: ON AVERAGE, HOW MANY DAYS PER WEEK DO YOU ENGAGE IN MODERATE TO STRENUOUS EXERCISE (LIKE A BRISK WALK)?: 0 DAYS

## 2021-10-04 SDOH — HEALTH STABILITY: MENTAL HEALTH
STRESS IS WHEN SOMEONE FEELS TENSE, NERVOUS, ANXIOUS, OR CAN'T SLEEP AT NIGHT BECAUSE THEIR MIND IS TROUBLED. HOW STRESSED ARE YOU?: NOT AT ALL

## 2021-10-04 SDOH — ECONOMIC STABILITY: TRANSPORTATION INSECURITY
IN THE PAST 12 MONTHS, HAS LACK OF TRANSPORTATION KEPT YOU FROM MEETINGS, WORK, OR FROM GETTING THINGS NEEDED FOR DAILY LIVING?: NO

## 2021-10-04 SDOH — HEALTH STABILITY: PHYSICAL HEALTH: ON AVERAGE, HOW MANY MINUTES DO YOU ENGAGE IN EXERCISE AT THIS LEVEL?: 0 MIN

## 2021-10-04 SDOH — ECONOMIC STABILITY: INCOME INSECURITY: HOW HARD IS IT FOR YOU TO PAY FOR THE VERY BASICS LIKE FOOD, HOUSING, MEDICAL CARE, AND HEATING?: NOT HARD AT ALL

## 2021-10-04 SDOH — ECONOMIC STABILITY: INCOME INSECURITY: IN THE LAST 12 MONTHS, WAS THERE A TIME WHEN YOU WERE NOT ABLE TO PAY THE MORTGAGE OR RENT ON TIME?: NO

## 2021-10-04 SDOH — ECONOMIC STABILITY: HOUSING INSECURITY: IN THE LAST 12 MONTHS, HOW MANY PLACES HAVE YOU LIVED?: 1

## 2021-10-04 SDOH — ECONOMIC STABILITY: FOOD INSECURITY: WITHIN THE PAST 12 MONTHS, THE FOOD YOU BOUGHT JUST DIDN'T LAST AND YOU DIDN'T HAVE MONEY TO GET MORE.: NEVER TRUE

## 2021-10-04 ASSESSMENT — PATIENT HEALTH QUESTIONNAIRE - PHQ9
4. FEELING TIRED OR HAVING LITTLE ENERGY: NOT AT ALL
1. LITTLE INTEREST OR PLEASURE IN DOING THINGS: NOT AT ALL
7. TROUBLE CONCENTRATING ON THINGS, SUCH AS READING THE NEWSPAPER OR WATCHING TELEVISION: SEVERAL DAYS
5. POOR APPETITE OR OVEREATING: NOT AT ALL
2. FEELING DOWN, DEPRESSED, IRRITABLE, OR HOPELESS: NOT AT ALL
SUM OF ALL RESPONSES TO PHQ9 QUESTIONS 1 AND 2: 0
8. MOVING OR SPEAKING SO SLOWLY THAT OTHER PEOPLE COULD HAVE NOTICED. OR THE OPPOSITE, BEING SO FIGETY OR RESTLESS THAT YOU HAVE BEEN MOVING AROUND A LOT MORE THAN USUAL: NOT AT ALL
6. FEELING BAD ABOUT YOURSELF - OR THAT YOU ARE A FAILURE OR HAVE LET YOURSELF OR YOUR FAMILY DOWN: NOT AL ALL
CLINICAL INTERPRETATION OF PHQ2 SCORE: 0
SUM OF ALL RESPONSES TO PHQ QUESTIONS 1-9: 1
9. THOUGHTS THAT YOU WOULD BE BETTER OFF DEAD, OR OF HURTING YOURSELF: NOT AT ALL
3. TROUBLE FALLING OR STAYING ASLEEP OR SLEEPING TOO MUCH: NOT AT ALL

## 2021-10-04 ASSESSMENT — SOCIAL DETERMINANTS OF HEALTH (SDOH)
HOW OFTEN DO YOU GET TOGETHER WITH FRIENDS OR RELATIVES?: MORE THAN THREE TIMES A WEEK
IN A TYPICAL WEEK, HOW MANY TIMES DO YOU TALK ON THE PHONE WITH FAMILY, FRIENDS, OR NEIGHBORS?: MORE THAN THREE TIMES A WEEK
HOW OFTEN DO YOU HAVE SIX OR MORE DRINKS ON ONE OCCASION: NEVER
HOW OFTEN DO YOU ATTENT MEETINGS OF THE CLUB OR ORGANIZATION YOU BELONG TO?: NEVER
DO YOU BELONG TO ANY CLUBS OR ORGANIZATIONS SUCH AS CHURCH GROUPS UNIONS, FRATERNAL OR ATHLETIC GROUPS, OR SCHOOL GROUPS?: NO
WITHIN THE PAST 12 MONTHS, YOU WORRIED THAT YOUR FOOD WOULD RUN OUT BEFORE YOU GOT THE MONEY TO BUY MORE: NEVER TRUE
HOW OFTEN DO YOU ATTEND CHURCH OR RELIGIOUS SERVICES?: NEVER
HOW OFTEN DO YOU GET TOGETHER WITH FRIENDS OR RELATIVES?: MORE THAN THREE TIMES A WEEK
IN A TYPICAL WEEK, HOW MANY TIMES DO YOU TALK ON THE PHONE WITH FAMILY, FRIENDS, OR NEIGHBORS?: MORE THAN THREE TIMES A WEEK
HOW HARD IS IT FOR YOU TO PAY FOR THE VERY BASICS LIKE FOOD, HOUSING, MEDICAL CARE, AND HEATING?: NOT HARD AT ALL
HOW OFTEN DO YOU ATTENT MEETINGS OF THE CLUB OR ORGANIZATION YOU BELONG TO?: NEVER
DO YOU BELONG TO ANY CLUBS OR ORGANIZATIONS SUCH AS CHURCH GROUPS UNIONS, FRATERNAL OR ATHLETIC GROUPS, OR SCHOOL GROUPS?: NO
HOW MANY DRINKS CONTAINING ALCOHOL DO YOU HAVE ON A TYPICAL DAY WHEN YOU ARE DRINKING: PATIENT DECLINED
HOW OFTEN DO YOU ATTEND CHURCH OR RELIGIOUS SERVICES?: NEVER
HOW OFTEN DO YOU HAVE A DRINK CONTAINING ALCOHOL: MONTHLY OR LESS

## 2021-10-04 ASSESSMENT — ENCOUNTER SYMPTOMS
DIARRHEA: 0
PALPITATIONS: 0
COUGH: 0
VOMITING: 0
DEPRESSION: 0
SHORTNESS OF BREATH: 1
HEADACHES: 0
FEVER: 0
CONSTIPATION: 0
NAUSEA: 0
DOUBLE VISION: 0
BLURRED VISION: 0
BLOOD IN STOOL: 0
MYALGIAS: 0
WEIGHT LOSS: 0
ABDOMINAL PAIN: 0
LOSS OF CONSCIOUSNESS: 0
DIAPHORESIS: 0
WEAKNESS: 0
CHILLS: 0

## 2021-10-04 ASSESSMENT — LIFESTYLE VARIABLES
HOW OFTEN DO YOU HAVE SIX OR MORE DRINKS ON ONE OCCASION: NEVER
HOW OFTEN DO YOU HAVE A DRINK CONTAINING ALCOHOL: MONTHLY OR LESS
HOW MANY STANDARD DRINKS CONTAINING ALCOHOL DO YOU HAVE ON A TYPICAL DAY: PATIENT DECLINED

## 2021-10-04 ASSESSMENT — FIBROSIS 4 INDEX: FIB4 SCORE: 0.18

## 2021-10-04 NOTE — ASSESSMENT & PLAN NOTE
-Patient with blood pressure of 141/92 on presentation, heart rate 120  -No prior history of hypertension  Plan  -Keep BP log  -Follow-up in 5 weeks

## 2021-10-04 NOTE — ASSESSMENT & PLAN NOTE
-No PFT's on file  -Last use of albuterol 10/1 after climbing a set of stairs while moving  -Follows with allergist  Plan:  -Continue Fluticase Furoate-Vilanterol 1 puff / day  -Continue Albuterol 2 puffs q6h PRN  -Continue Montelukast 10mg qD  -Follow with allergist-next appointment November

## 2021-10-04 NOTE — ASSESSMENT & PLAN NOTE
-Dx in 2014. BL dec sense of smell. Has been on chronic home desensitization therapy. Hx of chronic sinusitis with polyps. Prev underwent sinus surgery with Dr. Moore with massive recurrence of polyps  -Failed prednisone, Augmentin, and budesonide rinses  -Septoplasty with b/l sinusotomy, b/l ethmoidectomy, b/l maxillary antrostomy, and b/l sphenoidotomy all in Feb 2021  -Followed by an allergist and ENT-Dr. Hopkins  Plan:  -Continue ASA 325mg BID -- lifelong aspirin therapy to dec resp sx and progression of polyps   -Continue Fexofenadine 180mg daily  -Follow with allergist outpatient-next appointment November  -Follow with ENT-Dr. Hopkins next appointment Jan/feb

## 2021-10-04 NOTE — PROGRESS NOTES
Chief Complaint:  Post hospital admission follow up for COVID-19 PNA     History of Present Illness:   Guilherme Flaherty is a 36 year old female with past medical history of chronic HSV infection (on chronic suppressive therapy), aspirin exacerbated respiratory disease (on chronic aspirin, followed by allergist), migraines with aura, and asthma presenting for post hospital admission follow-up and to establish care.     She was admitted 9/11-9/19 for acute hypoxic respiratory failure 2/2 COVID-19 pneumonia.  She required % FiO2- did not require intubation. CXR (9/11) with pulmonary edema and/or infiltrate. TTE (9/13) LV systolic function WNL and LVEF estimated to be 55%; no significant valve abnormalities. S/p 10d dexamethasone. S/p 9d Baricitinib. Discharged on home O2 2L at rest and 3L ambulation.    Today she feels she is doing better in regards to both the dyspnea and cough. She continues to endorse the pleuritic chest pain (sharp 6/10) she experienced in the hospital however that has improved as well.  She has been trying to wean herself off the oxygen typically on RA at rest and still requiring 2 to 3 L oxygen on exertion.  The lowest she has dropped on oxygen is SPO2 88.  She drops to SPO2 85 on exertion without oxygen.  At rest on RA she has SPO2 90-92. Continues to use incentive spirometer.  Denies any new complaints.   Last rescue inhaler use 10/1 after climbing stairs.      Diet- 1 meal per day with vegetables, protein, carb.  Does not snack. Drinks coffee and water only   Exercise- walks 4-5x/ day with dog 20 min each     Past Medical History:   Past Medical History:   Diagnosis Date   • Abnormal weight gain    • Allergic rhinitis, cause unspecified    • Alopecia, unspecified    • Asthma 02/04/2021    Inhaler use daily and PRN.   • Depressive disorder, not elsewhere classified    • Dermatitis with stretch marks opening   • Environmental allergies     Cats, live stock, dogs, hola brush etc...   •  Family history of other chronic respiratory conditions    • Insomnia, unspecified    • Open wound of abdominal wall, anterior, without mention of complication    • Other atopic dermatitis and related conditions    • Other extrapyramidal disease and abnormal movement disorder    • Other malaise and fatigue    • Pap smear  10/2006   • Pneumococcal infection    • Pneumonia 2014   • Snoring    • Unspecified migraine      Patient Active Problem List    Diagnosis Date Noted   • High blood pressure 10/04/2021   • History of high blood pressure 10/04/2021   • Aspirin-exacerbated respiratory disease (AERD) 09/12/2021   • Acute hypoxemic respiratory failure due to COVID-19 ([facility]) 09/11/2021   • Migraines 09/11/2021   • HSV infection 09/11/2021   • Pneumonia due to COVID-19 virus 09/07/2021   • Class 3 severe obesity in adult ([facility]) 09/07/2021   • Asthma 09/07/2021   • Abnormal weight gain 09/16/2009   • Hair loss 09/16/2009   • 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   • Dermatitis 09/16/2009   • Allergic rhinitis 09/16/2009   • Insomnia 09/16/2009   • Family history of other chronic respiratory conditions 09/16/2009   • Open wound of anterior abdominal wall 09/16/2009   • Other malaise and fatigue 09/16/2009   • Depressive disorder, not elsewhere classified 09/16/2009   • Migraine 09/16/2009   • Other atopic dermatitis and related conditions 09/16/2009     Surgeries: 2008 hysterectomy, 3 C-sections, 2 sinus surgeries, abdominoplasty 2012 (PNA at that time, infected suture line in stomach- plastic surgery; deferred to next yr due to recent steroid use)    Allergies:  Aspirin (desensitized), Ibuprofen (desensitized), Sulfa drugs (anaphylaxis), Asa [aspirin], Latex, Latex, Sulfa drugs, and Tape (rash, itchy)    Medications:     Current Outpatient Medications:   •  valACYclovir, 500 mg, Oral, DAILY, Taking  •  amitriptyline, 20 mg, Oral, Nightly, Taking  •  fexofenadine, 180 mg, Oral, DAILY,  Taking  •  Vitamin C, 1,000 mg, Oral, DAILY, Taking  •  VITAMIN D PO, 1 Tablet, Oral, DAILY, Taking  •  Fluticasone Furoate-Vilanterol, 1 Puff, Inhalation, DAILY, Taking  •  aspirin, 325 mg, Oral, BID, Taking  •  montelukast, 10 mg, Oral, DAILY, Taking  •  SUMAtriptan, Take  mg at the onset of aura/HA; may re-dose x1 after 2 hrs if HA persists; MDD: 200 mg; do not use >2 days/week., Taking  •  albuterol, 2 Puff, Inhalation, Q6HRS PRN, Taking  •  Multiple Vitamins-Minerals (ZINC PO), 1 Tablet, Oral, DAILY (Patient not taking: Reported on 10/4/2021), Not Taking  •  valACYclovir, 500 mg, Oral, DAILY     Social History:   Social History     Tobacco Use   • Smoking status: Never Smoker   • Smokeless tobacco: Never Used   Vaping Use   • Vaping Use: Never used   Substance Use Topics   • Alcohol use: Yes     Comment: occasional   • Drug use: Never     Family History: Migraines mom side, clotting mom side  Family History   Problem Relation Age of Onset   • Other Mother         medical drug toxicity: warfarin, methadone   • Hypertension Father         stronf family hx. on dad's side of HTN   • Cancer Father         stomach     Review of Systems   Review of Systems   Constitutional: Negative for chills, diaphoresis, fever, malaise/fatigue and weight loss.   HENT: Negative for hearing loss.    Eyes: Negative for blurred vision and double vision.   Respiratory: Positive for shortness of breath (only apparent with exertion). Negative for cough (improved with guafenesin).    Cardiovascular: Positive for chest pain (pleuritic, improved). Negative for palpitations and leg swelling.   Gastrointestinal: Negative for abdominal pain, blood in stool, constipation, diarrhea, melena, nausea and vomiting.   Genitourinary: Negative for dysuria and hematuria.   Musculoskeletal: Negative for joint pain and myalgias.   Skin: Negative for rash.   Neurological: Negative for loss of consciousness, weakness and headaches.    Psychiatric/Behavioral: Negative for depression.      Vitals:   Vitals:    10/04/21 1524   BP: 141/92   Pulse: (!) 120   Temp: 37.3 °C (99.1 °F)   SpO2: 90%     Physical Exam:   Constitutional: NAD, well appearing.  HEENT: NC/AT  Cardiovascular: RRR.   No m/r/g. No carotid bruits. +CVT tenderness  Lungs: CTAB, no w/r/r, no respiratory distress.  Abdomen: Soft, NT/ND + BS, no masses, no suprapubic tenderness, no hepatomegaly.  Extremities:  2+ DP and radial pulses bilaterally.  No c/c/e.  Skin:  Warm and dry.    Neurologic: Alert & oriented x 3, CN II-XII grossly intact, strength and sensation grossly intact.  No focal deficits noted.  Psychiatric:  Affect normal, mood normal, judgment normal.    Assessment and Plan:    Mrs. Flaherty is a 37 y/o F with PMH asthma (no PFTs on file), with Aspirin-exacerbation of respiratory disease (lifelong aspirin therapy), migraines, recurrent HSV (on chronic suppressive therapy) presenting today for hospital follow-up s/p admission 9/11 for acute hypoxic respiratory failure 2/2 COVID-19 pneumonia.    #Pneumonia due to COVID-19 virus  -Hospital admission 9/11-9/19   -Failed home O2 monitoring program on 9/6 and was  re-admitted 9/11 with O2 sats in the 80s on 5L        NC-eventually required % FiO2- did not require  intubation.    -CXR (9/11) with pulmonary edema and/or infiltrate.    -TTE (9/13) LV systolic function WNL and LVEF estimated to  be 55%; no significant valve abnormalities   -S/p 10d dexamethasone   -S/p 9d Baricitinib   -Discharged 9/19 on home O2 2L at rest and 3L ambulation  -Currently denies any ongoing cough  -Continuing to require oxygen with exertion 2 to 3 L. Typically stays on RA at rest now  Plan:   -COVID-19 vaccine 90d after hospital admission (~Dec 19, 2021)  -Follow-up in 5 weeks    #High blood pressure  -Patient with blood pressure of 141/92 on presentation, heart rate 120  -No prior history of hypertension  Plan  -Keep BP log  -Follow-up in 5  weeks    #Asthma  -No PFT's on file  -Last use of albuterol 10/1 after climbing a set of stairs while moving  -Follows with allergist  Plan:  -Continue Fluticase Furoate-Vilanterol 1 puff / day  -Continue Albuterol 2 puffs q6h PRN  -Continue Montelukast 10mg qD  -Follow with allergist-next appointment November    #Aspirin-exacerbated respiratory disease (AERD)  -Dx in 2014. BL dec sense of smell. Has been on chronic home desensitization therapy. Hx of chronic sinusitis with polyps. Prev underwent sinus surgery with Dr. Moore with massive recurrence of polyps  -Failed prednisone, Augmentin, and budesonide rinses  -Septoplasty with b/l sinusotomy, b/l ethmoidectomy, b/l maxillary antrostomy, and b/l sphenoidotomy all in Feb 2021  -Followed by an allergist and ENT-Dr. Hopkins  Plan:  -Continue ASA 325mg BID -- lifelong aspirin therapy to dec resp sx and progression of polyps   -Continue Fexofenadine 180mg daily  -Follow with allergist outpatient-next appointment November  -Follow with ENT-Dr. Hopkins next appointment Jan/feb     #HSV infection  On chronic suppressive therapy at home   Plan:  Continue home Valacyclovir 500 mg q24h    #Migraine  On home amitriptyline for prophylaxis and sumatriptan for abortive therapy  -Followed by Dr Ramon   Plan:  -Continue amitriptyline 20mg PO qD   -Continue sumatriptan 50mg q8 hrs PRN   -Continue to follow with neurology-next appointment November    #Class 3 severe obesity in adult (HCC)  BMI 43.84  Plan:   -Counseled patient on importance of lifestyle modifications including increased physical activity and diet for weight loss   -Dietician referral     #Preventative Care   -Pap April 2021   -Mammography April 2021 for concern of mass with hx of benign R breast mass in past    Please note that this dictation was created using voice recognition software. I have made every reasonable attempt to correct obvious errors, but I expect that there are errors of grammar and possibly content  that I did not discover before finalizing the note.    Una Grijalva, PGY-1   Internal Medicine

## 2021-10-04 NOTE — ASSESSMENT & PLAN NOTE
BMI 43.84  Plan:   Counseled patient on importance of lifestyle modifications including increased physical activity and diet for weight loss

## 2021-10-04 NOTE — ASSESSMENT & PLAN NOTE
On home amitriptyline for prophylaxis and sumatriptan for abortive therapy  -Followed by Dr Ramon   Plan:  -Continue amitriptyline 20mg PO qD   -Continue sumatriptan 50mg q8 hrs PRN   -Continue to follow with neurology-next appointment November

## 2021-10-04 NOTE — ASSESSMENT & PLAN NOTE
-Hospital admission 9/11-9/19   -Failed home O2 monitoring program on 9/6 and was re-admitted 9/11 with O2 sats in the 80s on 5L        NC-eventually required % FiO2- did not require intubation.    -CXR (9/11) with pulmonary edema and/or infiltrate.    -TTE (9/13) LV systolic function WNL and LVEF estimated to be 55%; no significant valve abnormalities   -S/p 10d dexamethasone   -S/p 9d Baricitinib   -Discharged 9/19 on home O2 2L at rest and 3L ambulation  -Currently denies any ongoing cough  -Continuing to require oxygen with exertion 2 to 3 L. Typically stays on RA at rest now  Plan:   -COVID-19 vaccine 90d after hospital admission (~Dec 19, 2021)  -Follow-up in 5 weeks

## 2021-10-14 ENCOUNTER — TELEPHONE (OUTPATIENT)
Dept: INTERNAL MEDICINE | Facility: OTHER | Age: 36
End: 2021-10-14

## 2021-10-14 NOTE — TELEPHONE ENCOUNTER
Pt called and stated she is going on a 10 day roadtrip and needs a concentrator so that she doesn't have to carry a bunch of oxygen containers, please advise

## 2021-10-19 ENCOUNTER — OFFICE VISIT (OUTPATIENT)
Dept: NEUROLOGY | Facility: MEDICAL CENTER | Age: 36
End: 2021-10-19
Attending: PSYCHIATRY & NEUROLOGY
Payer: COMMERCIAL

## 2021-10-19 VITALS
WEIGHT: 232.14 LBS | DIASTOLIC BLOOD PRESSURE: 82 MMHG | HEART RATE: 102 BPM | SYSTOLIC BLOOD PRESSURE: 134 MMHG | HEIGHT: 60 IN | BODY MASS INDEX: 45.58 KG/M2 | OXYGEN SATURATION: 98 %

## 2021-10-19 DIAGNOSIS — G43.109 MIGRAINE WITH AURA AND WITHOUT STATUS MIGRAINOSUS, NOT INTRACTABLE: Primary | ICD-10-CM

## 2021-10-19 PROCEDURE — 99214 OFFICE O/P EST MOD 30 MIN: CPT | Performed by: PSYCHIATRY & NEUROLOGY

## 2021-10-19 RX ORDER — PROCHLORPERAZINE MALEATE 5 MG/1
TABLET ORAL
Qty: 20 TABLET | Refills: 5 | Status: SHIPPED | OUTPATIENT
Start: 2021-10-19 | End: 2021-11-19

## 2021-10-19 RX ORDER — AMITRIPTYLINE HYDROCHLORIDE 25 MG/1
50 TABLET, FILM COATED ORAL EVERY EVENING
Qty: 120 TABLET | Refills: 3 | Status: SHIPPED | OUTPATIENT
Start: 2021-10-19 | End: 2021-12-18

## 2021-10-19 RX ORDER — ELETRIPTAN HYDROBROMIDE 40 MG/1
TABLET, FILM COATED ORAL
Qty: 9 TABLET | Refills: 5 | Status: SHIPPED | OUTPATIENT
Start: 2021-10-19 | End: 2023-05-10

## 2021-10-19 ASSESSMENT — FIBROSIS 4 INDEX: FIB4 SCORE: 0.18

## 2021-10-19 NOTE — PROGRESS NOTES
"[facility] NEUROLOGY  GENERAL NEUROLOGY  FOLLOW-UP VISIT    CC: migraine with aura    INTERVAL HISTORY:  Guilherme Flaherty is a 36 y.o. woman with migraine with aura as well as insomnia, depression, and COVID-19 (on home O2).  I last saw her in the clinic on 8/6/2021.  At that time I recommended she start amitriptyline.  Today, she was unaccompanied, and she provided the following interval history:    The following is a summary of headache symptoms, presented in my standard format:    Family History: mother, aunts, sisters, grandfather all have migraines  Age at onset: 9 years old  Location: last year: bi-occipital, worse on the right  Radiation: last year: into the right eye  Frequency: migraines: 2-3/month, baseline headache: daily; lately 1-2 headaches/week  Duration: all day, fades around 15:00-16:00  Headache Days/Month: baseline: 30/30, current: 6/30  Quality: variable: \"pounding, sharp\"  Intensity: baseline: 10/10  Aura: visual (spots)  Photophobia/Phonophobia/Nausea/Vomiting: yes/yes/yes/yes  Provoked by Physical Activity?:   Triggers: none identified  Associated Symptoms:   Autonomic Signs (such as ptosis, miosis, conjunctival injection, rhinorrhea, increased lacrimation):   Head Trauma:   Association with Menses: none, s/p hysterectomy in 2008  ED Visits:   Hospitalizations:   Missed Work Days (in-home ): none  Sleep: 5-6 hours/night  Caffeine Intake: 1 coffee/morning, sometimes tea in the afternoon  Hydration: keeps well hydrated  Nutrition: skips meals sometimes  Exercise:   Analgesic Overuse:      Current Medication Regimen:  - sumatriptan: 100 is helpful, but it caused sedation     Medications Tried: Response  Preventive:  - topiramate: 50 mg caused side effects (falls, dropping objects)  - amitriptyline: 20 mg nightly was partially effective     Abortive:  -      Medications Not Tried:  - propranolol: will avoid due to presence of asthma    MEDICATIONS:  Current Outpatient Medications "   Medication Sig   • amitriptyline (ELAVIL) 25 MG Tab Take 2 Tablets by mouth every evening for 60 days.   • eletriptan (RELPAX) 40 MG tablet Take 40 mg at the onset of aura/HA; may re-dose x1 after 2 hrs if HA persists; MDD: 80 mg; do not use >2 days/week.   • prochlorperazine (COMPAZINE) 5 MG Tab Take 5-10 mg at the onset of aura/HA; may re-dose x1 after 2 hrs if HA persists; MDD: 20 mg; do not use >3 days/week.   • valACYclovir (VALTREX) 500 MG Tab Take 1 Tablet by mouth every day.   • fexofenadine (ALLEGRA) 180 MG tablet Take 180 mg by mouth every day.   • Ascorbic Acid (VITAMIN C) 1000 MG Tab Take 1,000 mg by mouth every day.   • VITAMIN D PO Take 1 Tablet by mouth every day.   • Fluticasone Furoate-Vilanterol (BREO) 100-25 MCG/INH AEROSOL POWDER, BREATH ACTIVATED Inhale 1 Puff every day.   • aspirin (ASA) 325 MG Tab Take 325 mg by mouth 2 times a day.   • montelukast (SINGULAIR) 10 MG Tab Take 10 mg by mouth every day.   • albuterol 108 (90 Base) MCG/ACT Aero Soln inhalation aerosol Inhale 2 Puffs every 6 hours as needed for Shortness of Breath.   • Multiple Vitamins-Minerals (ZINC PO) Take 1 Tablet by mouth every day. (Patient not taking: Reported on 10/4/2021)     MEDICAL, SOCIAL, AND FAMILY HISTORY:  There is no change in the patient's ROS or medical, social, or family histories since the previous visit on 8/6/2021.    REVIEW OF SYSTEMS:  A ROS was completed.  Pertinent positives and negatives were included in the HPI, above.  All other systems were reviewed and are negative.    PHYSICAL EXAM:  General/Medical:  - NAD  - NC in place    Neuro:  MENTAL STATUS: awake and alert; no deficits of speech or language; oriented to person, place, and time; affect was appropriate to situation; pleasant, cooperative    CRANIAL NERVES:    II: acuity: NT, fields: NT, pupils: NT, discs: NT    III/IV/VI: versions: grossly intact    V: facial sensation: NT    VII: facial expression: symmetric    VIII: hearing: intact to  voice    IX/X: palate: NT    XI: shoulder shrug: NT    XII: tongue: NT    MOTOR:  - bulk: NT  - tone: NT  Upper Extremity Strength  (R/L)    NT   Elbow flexion NT   Elbow extension NT   Shoulder abduction NT     Lower Extremity Strength  (R/L)   Hip flexion NT   Knee extension NT   Knee flexion NT   Ankle plantarflexion NT   Ankle dorsiflexion NT     - pronator drift: NT  - abnormal movements: none    SENSATION:  - light touch: NT  - vibration (R/L, seconds): NT at the great toes  - pinprick: NT  - proprioception: NT  - Romberg: absent    COORDINATION:  - finger to nose: NT  - finger tapping: NT    REFLEXES:  Reflex Right Left   BR NT NT   Biceps NT NT   Triceps NT NT   Patellae NT NT   Achilles NT NT   Toes NT NT     GAIT:  - NT    REVIEW OF IMAGING STUDIES:  No additional brain imaging since the last visit.    REVIEW OF LABORATORY STUDIES:  9/19/2021:  - CBC w/ diff: notable for WBC: 11.6  - CMP: notable for glucose: 138    ASSESSMENT:  Guilherme Flaherty is a 36 y.o. woman with chronic migraine with aura and a history otherwise notable for insomnia, depression, and COVID-19 (on home O2).  Plans/recommendations as follows:    PLAN:  Chronic Migraine w/ Aura:  Prevention:  - increase amitriptyline to 50 mg nightly (25 mg tablets)  - try supplementing:  - magnesium: 400-600 mg once or 200-300 mg twice daily  - riboflavin (vitamin B2): 400 mg once daily  - coenzyme Q10: 300 mg daily  - get 7-9 hours of sleep per night; can try supplementing melatonin 2-10 mg, 2-3 hours before bedtime  - drink plenty of fluids (urine should be nearly clear)  - avoid excessive caffeine intake (no more than 2 servings per day and nothing in the afternoon)  - eat regular meals (don't skip meals)  - get moderate exercise (even just a 20 minute walk daily)    Rescue:  - stop sumatriptan due to side effects  - take eletriptan 40 mg: take this at the onset of aura or headache pain; may re-dose x1 after 2 hours if headache persists; do  not use more than 2 days/week  - prochlorperazine 5-10 mg: take this at the onset of aura or headache to prevent or reduce nausea; may re-dose after 2 hours if headache or nausea persist; do not use more often than 3 days/week  - do not use analgesics (e.g., ibuprofen, acetaminophen) more than 2 days per week in order to avoid analgesic rebound headaches    - keep a headache log    Follow-Up:  - Return in about 8 weeks (around 12/14/2021).    Signed: Phil Stinson M.D.

## 2021-10-24 DIAGNOSIS — J12.82 PNEUMONIA DUE TO COVID-19 VIRUS: ICD-10-CM

## 2021-10-24 DIAGNOSIS — U07.1 PNEUMONIA DUE TO COVID-19 VIRUS: ICD-10-CM

## 2021-10-25 NOTE — PROGRESS NOTES
Patient reached out in regards to a road trip - placed an order for DME - O2 concentrator so she does not have to carry around her machine during trip.

## 2022-08-14 ENCOUNTER — OFFICE VISIT (OUTPATIENT)
Dept: URGENT CARE | Facility: PHYSICIAN GROUP | Age: 37
End: 2022-08-14
Payer: COMMERCIAL

## 2022-08-14 ENCOUNTER — APPOINTMENT (OUTPATIENT)
Dept: RADIOLOGY | Facility: IMAGING CENTER | Age: 37
End: 2022-08-14
Attending: NURSE PRACTITIONER
Payer: COMMERCIAL

## 2022-08-14 ENCOUNTER — APPOINTMENT (OUTPATIENT)
Dept: RADIOLOGY | Facility: IMAGING CENTER | Age: 37
End: 2022-08-14
Attending: NURSE PRACTITIONER

## 2022-08-14 VITALS
BODY MASS INDEX: 45.55 KG/M2 | HEIGHT: 60 IN | OXYGEN SATURATION: 96 % | WEIGHT: 232 LBS | HEART RATE: 116 BPM | RESPIRATION RATE: 16 BRPM | SYSTOLIC BLOOD PRESSURE: 140 MMHG | DIASTOLIC BLOOD PRESSURE: 100 MMHG | TEMPERATURE: 99 F

## 2022-08-14 DIAGNOSIS — M25.462 EFFUSION OF LEFT KNEE: ICD-10-CM

## 2022-08-14 DIAGNOSIS — S83.92XA SPRAIN OF LEFT KNEE, UNSPECIFIED LIGAMENT, INITIAL ENCOUNTER: ICD-10-CM

## 2022-08-14 DIAGNOSIS — S89.92XA INJURY OF LEFT KNEE, INITIAL ENCOUNTER: ICD-10-CM

## 2022-08-14 PROCEDURE — 73564 X-RAY EXAM KNEE 4 OR MORE: CPT | Mod: TC,LT | Performed by: RADIOLOGY

## 2022-08-14 PROCEDURE — 99203 OFFICE O/P NEW LOW 30 MIN: CPT | Performed by: NURSE PRACTITIONER

## 2022-08-14 ASSESSMENT — FIBROSIS 4 INDEX: FIB4 SCORE: 0.19
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
## 2022-08-25 PROBLEM — S83.512A LEFT ACL TEAR: Status: ACTIVE | Noted: 2022-08-25

## 2022-09-07 PROBLEM — S83.511A COMPLETE TEAR OF RIGHT ACL, INITIAL ENCOUNTER: Status: ACTIVE | Noted: 2022-09-07

## 2023-05-08 ENCOUNTER — TELEPHONE (OUTPATIENT)
Dept: NEUROLOGY | Facility: MEDICAL CENTER | Age: 38
End: 2023-05-08
Payer: COMMERCIAL

## 2023-05-08 DIAGNOSIS — G43.109 MIGRAINE WITH AURA AND WITHOUT STATUS MIGRAINOSUS, NOT INTRACTABLE: ICD-10-CM

## 2023-05-08 RX ORDER — AMITRIPTYLINE HYDROCHLORIDE 25 MG/1
50 TABLET, FILM COATED ORAL EVERY EVENING
Qty: 60 TABLET | Refills: 0 | Status: SHIPPED | OUTPATIENT
Start: 2023-05-08 | End: 2023-05-10

## 2023-05-08 NOTE — TELEPHONE ENCOUNTER
Pt called and scheduled a [facility] this Wednesday 5/10 with Shantell. Also asking if she's able to get a refill on her prescription amitriptyline.

## 2023-05-08 NOTE — TELEPHONE ENCOUNTER
Received request via: Patient    Was the patient seen in the last year in this department? No- LOV:10/19/21,follow up scheduled 5/10/23    Does the patient have an active prescription (recently filled or refills available) for medication(s) requested? No    Does the patient have MCFP Plus and need 100 day supply (blood pressure, diabetes and cholesterol meds only)? Medication is not for cholesterol, blood pressure or diabetes

## 2023-05-10 ENCOUNTER — OFFICE VISIT (OUTPATIENT)
Dept: NEUROLOGY | Facility: MEDICAL CENTER | Age: 38
End: 2023-05-10
Attending: PSYCHIATRY & NEUROLOGY
Payer: COMMERCIAL

## 2023-05-10 VITALS
SYSTOLIC BLOOD PRESSURE: 130 MMHG | OXYGEN SATURATION: 94 % | WEIGHT: 227.51 LBS | BODY MASS INDEX: 41.61 KG/M2 | RESPIRATION RATE: 16 BRPM | DIASTOLIC BLOOD PRESSURE: 96 MMHG | HEART RATE: 89 BPM | TEMPERATURE: 98.1 F

## 2023-05-10 DIAGNOSIS — G43.109 MIGRAINE WITH AURA AND WITHOUT STATUS MIGRAINOSUS, NOT INTRACTABLE: Primary | ICD-10-CM

## 2023-05-10 PROCEDURE — 99214 OFFICE O/P EST MOD 30 MIN: CPT | Performed by: PSYCHIATRY & NEUROLOGY

## 2023-05-10 PROCEDURE — 99212 OFFICE O/P EST SF 10 MIN: CPT | Performed by: PSYCHIATRY & NEUROLOGY

## 2023-05-10 RX ORDER — AMITRIPTYLINE HYDROCHLORIDE 25 MG/1
25 TABLET, FILM COATED ORAL NIGHTLY
Qty: 30 TABLET | Refills: 3 | Status: SHIPPED | OUTPATIENT
Start: 2023-05-10 | End: 2023-09-01 | Stop reason: SDUPTHER

## 2023-05-10 RX ORDER — PHENTERMINE HYDROCHLORIDE 37.5 MG/1
1 TABLET ORAL DAILY
COMMUNITY
Start: 2023-04-16 | End: 2023-08-14

## 2023-05-10 RX ORDER — RIMEGEPANT SULFATE 75 MG/75MG
75 TABLET, ORALLY DISINTEGRATING ORAL
Qty: 8 TABLET | Refills: 5 | Status: SHIPPED | OUTPATIENT
Start: 2023-05-10 | End: 2023-12-16 | Stop reason: SDUPTHER

## 2023-05-10 RX ORDER — ERENUMAB-AOOE 70 MG/ML
70 INJECTION SUBCUTANEOUS
Qty: 1 ML | Refills: 11 | Status: SHIPPED | OUTPATIENT
Start: 2023-05-10 | End: 2023-08-14

## 2023-05-10 ASSESSMENT — FIBROSIS 4 INDEX: FIB4 SCORE: 0.19

## 2023-05-10 ASSESSMENT — PATIENT HEALTH QUESTIONNAIRE - PHQ9: CLINICAL INTERPRETATION OF PHQ2 SCORE: 0

## 2023-05-10 NOTE — PROGRESS NOTES
"[facility] NEUROLOGY  GENERAL NEUROLOGY  FOLLOW-UP VISIT    CC: migraine with aura    INTERVAL HISTORY:  Guilherme Flaherty is a 37 y.o. woman with migraine with aura as well as insomnia, depression, and COVID-19 (on home O2).  I last saw her in the clinic on 10/19/2021.  At that time I recommended she increase amitriptyline to 50 mg nightly and try eletriptan PRN.  Today, she was unaccompanied, and she provided the following interval history:    The following is a summary of headache symptoms, presented in my standard format:    Family History: mother, aunts, sisters, grandfather all have migraines  Age at onset: 9 years old  Location: bi-occipital, worse on the right  Radiation: anterior, retro-orbital (right worse than left)  Frequency: baseline headache: daily, lately: 3/week, pain wakes her up from sleep  Duration: all day, fades around 15:00-16:00  Headache Days/Month: baseline: 30/30, current: 15/30  Quality: variable: \"pounding, throbbing\"  Intensity: baseline: 10/10  Aura: visual (spots)  Photophobia/Phonophobia/Nausea/Vomiting: yes/yes/yes/yes  Provoked by Physical Activity?:   Triggers: none identified  Associated Symptoms:   Autonomic Signs (such as ptosis, miosis, conjunctival injection, rhinorrhea, increased lacrimation):   Head Trauma:   Association with Menses: none, s/p hysterectomy in 2008  ED Visits:   Hospitalizations:   Missed Work Days (in-home ): none  Sleep: 7 hours/night  Caffeine Intake: 1 coffee/morning  Hydration: keeps well hydrated  Nutrition: skips breakfast  Exercise:   Analgesic Overuse:      Current Medication Regimen:  - amitriptyline 25 mg: partially effective  - eletriptan: associated with fatigue     Medications Tried: Response  Preventive:  - topiramate: 50 mg caused side effects (falls, dropping objects)  - amitriptyline: 50 mg was associated with intolerable dry mouth     Abortive:  - sumatriptan: 100 is helpful, but it caused sedation     Medications Not Tried:  - " propranolol: will avoid due to presence of asthma    MEDICATIONS:  Current Outpatient Medications   Medication Sig    phentermine (ADIPEX-P) 37.5 MG tablet Take 1 Tablet by mouth every day.    amitriptyline (ELAVIL) 25 MG Tab Take 2 Tablets by mouth every evening.    eletriptan (RELPAX) 40 MG tablet Take 40 mg at the onset of aura/HA; may re-dose x1 after 2 hrs if HA persists; MDD: 80 mg; do not use >2 days/week.    valACYclovir (VALTREX) 500 MG Tab Take 1 Tablet by mouth every day.    fexofenadine (ALLEGRA) 180 MG tablet Take 180 mg by mouth every day.    Fluticasone Furoate-Vilanterol (BREO) 100-25 MCG/INH AEROSOL POWDER, BREATH ACTIVATED Inhale 1 Puff every day.    aspirin (ASA) 325 MG Tab Take 325 mg by mouth 2 times a day.    montelukast (SINGULAIR) 10 MG Tab Take 10 mg by mouth every day.    albuterol 108 (90 Base) MCG/ACT Aero Soln inhalation aerosol Inhale 2 Puffs every 6 hours as needed for Shortness of Breath.    ondansetron (ZOFRAN) 4 MG Tab tablet Take 1 Tablet by mouth every four hours as needed for Nausea/Vomiting.     MEDICAL, SOCIAL, AND FAMILY HISTORY:  There is no change in the patient's ROS or medical, social, or family histories since the previous visit on 10/19/2021.    REVIEW OF SYSTEMS:  A ROS was completed.  Pertinent positives and negatives were included in the HPI, above.  All other systems were reviewed and are negative.    PHYSICAL EXAM:  General/Medical:  - NAD  - NC in place    Neuro:  MENTAL STATUS: awake and alert; no deficits of speech or language; oriented to person, place, and time; affect was appropriate to situation; pleasant, cooperative    CRANIAL NERVES:    II: acuity: NT, fields: NT, pupils: NT, discs: NT    III/IV/VI: versions: grossly intact    V: facial sensation: NT    VII: facial expression: symmetric    VIII: hearing: intact to voice    IX/X: palate: NT    XI: shoulder shrug: NT    XII: tongue: NT    MOTOR:  - bulk: NT  - tone: NT  Upper Extremity Strength  (R/L)     NT   Elbow flexion NT   Elbow extension NT   Shoulder abduction NT     Lower Extremity Strength  (R/L)   Hip flexion NT   Knee extension NT   Knee flexion NT   Ankle plantarflexion NT   Ankle dorsiflexion NT     - pronator drift: NT  - abnormal movements: none    SENSATION:  - light touch: NT  - vibration (R/L, seconds): NT at the great toes  - pinprick: NT  - proprioception: NT  - Romberg: absent    COORDINATION:  - finger to nose: NT  - finger tapping: NT    REFLEXES:  Reflex Right Left   BR NT NT   Biceps NT NT   Triceps NT NT   Patellae NT NT   Achilles NT NT   Toes NT NT     GAIT:  - NT    REVIEW OF IMAGING STUDIES:  No additional data since the last visit.    REVIEW OF LABORATORY STUDIES:  No recent data available.    ASSESSMENT:  Guilherme Flaherty is a 37 y.o. woman with chronic migraine with aura and a history otherwise notable for insomnia, depression, and COVID-19 (on home O2).  Plans/recommendations as follows:    PLAN:  Chronic Migraine w/ Aura:  Prevention:  - trial of Aimovig 70 mg/month  - continue amitriptyline 25 mg nightly for now (hope to reduce the dosage or discontinue completely if Aimovig is effective due to side effects)  - try supplementing:  - magnesium: 400-600 mg once or 200-300 mg twice daily  - riboflavin (vitamin B2): 400 mg once daily  - coenzyme Q10: 300 mg daily  - get 7-9 hours of sleep per night; can try supplementing melatonin 2-10 mg, 2-3 hours before bedtime  - drink plenty of fluids (urine should be nearly clear)  - avoid excessive caffeine intake (no more than 2 servings per day and nothing in the afternoon)  - eat regular meals (don't skip meals)  - get moderate exercise (even just a 20 minute walk daily)    Rescue:  - stop eletriptan due to side effects  - Nurtec 75 mg: take 1 tablet (75 mg) at the onset of aura/headache; limit 1 tablet (75 mg) in 24 hours; try to limit Nurtec to 1 tablet every 48 hours; do not dose Nurtec and any triptan within 24-hours of one  another  - do not use analgesics (e.g., ibuprofen, acetaminophen) more than 2 days per week in order to avoid analgesic rebound headaches    - keep a headache log    Follow-Up:  - Return in about 4 months (around 9/10/2023).    Signed: Phil Stinson M.D.

## 2023-05-11 ENCOUNTER — TELEPHONE (OUTPATIENT)
Dept: NEUROLOGY | Facility: MEDICAL CENTER | Age: 38
End: 2023-05-11
Payer: COMMERCIAL

## 2023-05-11 NOTE — TELEPHONE ENCOUNTER
NURTEC 75 MG Tbdp    PA approved request Reference Number: [id number] is approved through 08/11/2023 #8/30 DS - TC paid copay $286.41 #10/30 DS notifying liaison Jacki Horvath. - 05/11/2023 11:04am    Aimovig 70mg/ml SOAJ    PA approved request Reference Number: [id number]. AIMOVIG INJ 70MG/ML is approved through 11/11/2023 - TC paid copay $286.41 #1ml/30 DS Max DS 31 - notifying liaison Jacki Horvath. - 05/11/2023 10:54am

## 2023-05-12 ENCOUNTER — DOCUMENTATION (OUTPATIENT)
Dept: PHARMACY | Facility: MEDICAL CENTER | Age: 38
End: 2023-05-12
Payer: COMMERCIAL

## 2023-05-12 PROCEDURE — RXMED WILLOW AMBULATORY MEDICATION CHARGE: Performed by: PSYCHIATRY & NEUROLOGY

## 2023-05-15 ENCOUNTER — PHARMACY VISIT (OUTPATIENT)
Dept: PHARMACY | Facility: MEDICAL CENTER | Age: 38
End: 2023-05-15
Payer: COMMERCIAL

## 2023-05-16 NOTE — PROGRESS NOTES
PHARMACIST PRE SCREEN - **New/TRF Onboarding**  Diagnosis: Migraine with aura and without status migrainosus, not intractable [G43.109]    Drug & Non-Drug Allergies: EMR Reviewed. No concerning drug or non-drug allergies. Patient has listed latex allergy (rash/itching) will review with patient risks d/t needle cap containing latex derivative.                                                                                         Drug Therapy (name/formulation/dose/route of admin/frequency):  Aimovg 70mg/mL auto-injector, 1 pen SC Q 30 days  EMR Reviewed   - Dose Appropriateness (Y/N): Y   - Renal or Hepatic Adjustments (Y/N): N   - Any comorbidities, PMH, precautions, or contraindications that pose a medication safety concern? (Y/N): N   - Any relevant lab work needed that affects the initial start date? (Y/N): N   - List Past Treatments: amitriptyline; eletriptan; topiramate; sumatriptan   - List DDI’s: no clinically significant DDIs    - Patient’s ability to self-administer medication: No issues identified per EMR  List Goals of Therapy:    To reduce migraine frequency, duration, and severity    To improve acute medication responsiveness and reduce the need for acute attacks    To identify and modify migraine triggers    PHARMACIST NEW START - Initial Assessment  Dx: Migraine with aura and without status migrainosus, not intractable [G43.109]     Tx prescribed:  Aimovg 70mg/mL auto-injector, 1 pen SC Q 30 days    Administration: started on 5/10/23, in office provided sample + teach per Guilherme. She demoed understanding of injection technique, and has prior experience with various auto-injector meds (insulin, epi-pen). Encouraged to use rotating sites of top of thigh, abdomen or upper outer arm. Reviewed prior to admin bring Aimovig pen to room temp over 30 minutes but  do not shake, hold, or heat.    Proper Handling/Disposal: Aware of sharps handling and disposal.     Storage/Excursion: Upon receiving delivery to  store Aimovig in fridge, protect from heat, light, and freezing. Once brought to room temp not return to refrigeration, can be kept at room temp (max of 77F) for up to 7 days.    Duration of therapy:  until progression, toxicity, or no longer clinically beneficial     Adherence & Potential Barriers: Advised for her to write down her next dose to be completed on 6/10 and will fall on the 10th of every month moving forward    Missed dose mgmt: deferred**     List Common, Serious SE & Mitigation Reviewed:     Constipation: Adq hydration; increasing/maintaining intake of dietary fiber     Injection Site Reactions (swelling, bruising, pain, irritation): ice before/after admin; avoiding/limiting touching inj site after; inj site rotation; top cortisone; oral non-drowsy antihistamine    List Precautions Reviewed:     Hypersensitivity: Report to MD if any presentation/development of: hives; fever; joint pain; swelling of the tongue/face/lips; SOB/difficulty breathing. Alerted Guilherme the needle cap to Aimovig is latex containing; confirmed her latex allergy is limited to contact dermatitis - advised she cover the needle cap with a piece of plastic to remove to avoid touching directly.     List Current SE Reviewed (if applicable): none at this time.     Mitigation/mgmt: n/a     S/Sx:     Nausea/Vomiting    Loss of vision     How many migraine days in the past month? 1 per week.    Migraine Pain severity: always severe    Avg duration of migraine in past month: If she's able to take episodic meds it will start to resolve by 4pm; 24 hours typically.    Other Pertinent Migraine Med: amitriptyline   Acute Episodic Medication Use: nurtec - hasn't started yet  Clinically Relevant, Abnormal Labs: no recent   Wellness/Lifestyle Counseling:    Immunizations: deferred**     Triggers: deferred**    Diet/hydration: deferred**    OTC HA meds: deferred**    Sleep: deferred**     ADLs: missed 1 day of work in the past month   Med  Rec/Updated drug list: EMR accurate, no medication list inaccuracies. Reviewed with patient/caregiver.   DI Check: no clinically significant DDIs   Common DI & Dietary Avoidance: Reviewed to not start any new meds/herbs/OTCs/supplements without speaking to clinic/pharmacist to check for DDIs.    Goals of Therapy:      To reduce migraine frequency, duration, and severity    To improve acute medication responsiveness and reduce the need for acute attacks    To identify and modify migraine triggers  Patient has agreed/understands to goals of therapy during education/counseling    Additional: Had a pleasant talk with Guilherme who informed me she had already started therapy in office 5/10/23. She denies any side effects thus far and feels confident in self injecting for next month. Reports her migraines are of low frequency but still very severe when they occur, she is hoping the Aimovig will help lessen the severity or make her migraine free. Aware it can take up to 3 months to see full benefits and to continue all her other migraine medications. No questions/concerns at this time and thankful to have the additional help.

## 2023-06-09 PROCEDURE — RXMED WILLOW AMBULATORY MEDICATION CHARGE: Performed by: PSYCHIATRY & NEUROLOGY

## 2023-06-12 ENCOUNTER — PHARMACY VISIT (OUTPATIENT)
Dept: PHARMACY | Facility: MEDICAL CENTER | Age: 38
End: 2023-06-12
Payer: COMMERCIAL

## 2023-06-15 ENCOUNTER — DOCUMENTATION (OUTPATIENT)
Dept: PHARMACY | Facility: MEDICAL CENTER | Age: 38
End: 2023-06-15
Payer: COMMERCIAL

## 2023-06-15 NOTE — PROGRESS NOTES
First Cycle   Dx: Migraine with aura and without status migrainosus, not intractable [G43.109]    Tx prescribed:  Aimovig 70mg/mL pen, inject 1 pen SQ every 30 days   - Administration:  injected into back of arm herself, no issues, advised to rotate sites    Adherence: first injection at home Sunday 6/11, was a day late, has phone alarm reminder set   - Missed dose mgmt:  was 1 day last this last injection, let her know if one or 2 days late fine to keep same schedule and if more than that start new schedule from last date given       Current SE: none   - Mitigation/Mgmt: n/a    List Changes to Allergies, Diagnoses:  none    How many migraine days in the past month? 2  Pain severity:  4-5/10, caught early   Avg duration of migraine in past month: hours when usually all day, improved after Nurtec- caught early   Missed planned events or work: none, migraines were caught early enough and Nurtec was able to keep them under control     Med Rec/Updated drug list:    - No changes since last assessment, reviewed with patient.    Additional:   Had a brief and pleasant conversation with Guilherme to see how first Aimovig injection at home went. She gave it Sunday 6/11 into the back of her arm herself, said it went well with no issues. She was a day late, reviewed missed/late dose mgmt. Notes that Nurtec works really well as abortive. Had 2 migraines this past month lasting a few hours vs a day and pain 4-5/10. She's not aware of any triggers and has a pretty routine schedule, hasn't been able to identify anything, joked that she always has a lot of stress. Encouraged her to drink lots of water and eat a healthy diet. She was appreciative of check in. Will follow up in 3 months for efficacy.

## 2023-07-12 PROCEDURE — RXMED WILLOW AMBULATORY MEDICATION CHARGE: Performed by: PSYCHIATRY & NEUROLOGY

## 2023-07-13 ENCOUNTER — DOCUMENTATION (OUTPATIENT)
Dept: PHARMACY | Facility: MEDICAL CENTER | Age: 38
End: 2023-07-13
Payer: COMMERCIAL

## 2023-07-13 NOTE — PROGRESS NOTES
I SPOKE WITH STACY ABOUT RF FOR AIMOVIG 1/30DS $5, NURTEC 8/30DS $0. OK TO CHARGE [facility].   DELIVERY 07/19. PATIENT STATED THEY ARE DOING WELL ON MEDICATION. NO MISSED DOSES.  0 ON HAND AIMOVIG, 6 TABS ON HAND -NURTEC.  NO QUESTIONS FOR RPH.  STACY WAS SWEET AND APPRECIATED CALL FOR FOLLOW UP/REFILL

## 2023-07-19 ENCOUNTER — PHARMACY VISIT (OUTPATIENT)
Dept: PHARMACY | Facility: MEDICAL CENTER | Age: 38
End: 2023-07-19
Payer: COMMERCIAL

## 2023-08-07 PROCEDURE — RXMED WILLOW AMBULATORY MEDICATION CHARGE: Performed by: PSYCHIATRY & NEUROLOGY

## 2023-08-08 ENCOUNTER — TELEPHONE (OUTPATIENT)
Dept: PHARMACY | Facility: MEDICAL CENTER | Age: 38
End: 2023-08-08
Payer: COMMERCIAL

## 2023-08-08 NOTE — TELEPHONE ENCOUNTER
Contact:  Phone number:[phone] (mobile)    Name of person spoken with and relationship to patient: Guilherme Flaherty, Self   Patient’s Adherence:  How patient is doing on medication: Ok or neutral    How many missed doses and reason: 0       Any new medications: no    Any new conditions: no    Any new allergies: no    Any new side effects: no    Any new diagnoses: no    How many doses remaining: Several    Did patient want to speak with pharmacist: No   Delivery:  Delivery date and method: 08/09 via     Next Injection Date: 09/10-Aimovig    Signature required: No     Any additional details for : N/A   Teach Appointment Date:  N/A   Shipping Address:  [facility] [address] [facility] [address]   Medication(name,strength and dose):  Aimovig 70mg/mL; Nurtec 75mg tabs   Copay:  $5   Payment Method:  Credit card on file   Supplies:  None   Additional Information:  08/07/23: Spoke with Guilherme. She is doing well on the Aimovig 70mg/mL; Nurtec 75mg tabs. She reports no side effects to the medication and no new allergies. She reports 0 missed doses and has 1 dose of Aimovig (due on 08/10) and several tablets of Nurtec on hand. Sending delivery for 08/09 via . Okay to charge $5 to [employer].

## 2023-08-09 ENCOUNTER — PHARMACY VISIT (OUTPATIENT)
Dept: PHARMACY | Facility: MEDICAL CENTER | Age: 38
End: 2023-08-09
Payer: COMMERCIAL

## 2023-08-14 ENCOUNTER — OFFICE VISIT (OUTPATIENT)
Dept: NEUROLOGY | Facility: MEDICAL CENTER | Age: 38
End: 2023-08-14
Attending: PSYCHIATRY & NEUROLOGY
Payer: COMMERCIAL

## 2023-08-14 VITALS
HEIGHT: 61 IN | WEIGHT: 227 LBS | HEART RATE: 96 BPM | OXYGEN SATURATION: 95 % | DIASTOLIC BLOOD PRESSURE: 82 MMHG | TEMPERATURE: 98.1 F | SYSTOLIC BLOOD PRESSURE: 134 MMHG | BODY MASS INDEX: 42.86 KG/M2

## 2023-08-14 DIAGNOSIS — G43.109 MIGRAINE WITH AURA AND WITHOUT STATUS MIGRAINOSUS, NOT INTRACTABLE: Primary | ICD-10-CM

## 2023-08-14 PROCEDURE — 3075F SYST BP GE 130 - 139MM HG: CPT | Performed by: PSYCHIATRY & NEUROLOGY

## 2023-08-14 PROCEDURE — 3079F DIAST BP 80-89 MM HG: CPT | Performed by: PSYCHIATRY & NEUROLOGY

## 2023-08-14 PROCEDURE — 99214 OFFICE O/P EST MOD 30 MIN: CPT | Performed by: PSYCHIATRY & NEUROLOGY

## 2023-08-14 PROCEDURE — 99212 OFFICE O/P EST SF 10 MIN: CPT | Performed by: PSYCHIATRY & NEUROLOGY

## 2023-08-14 RX ORDER — FREMANEZUMAB-VFRM 225 MG/1.5ML
225 INJECTION SUBCUTANEOUS
Qty: 4.5 ML | Refills: 3 | Status: SHIPPED | OUTPATIENT
Start: 2023-08-14 | End: 2023-10-09 | Stop reason: SDUPTHER

## 2023-08-14 ASSESSMENT — FIBROSIS 4 INDEX: FIB4 SCORE: 0.19

## 2023-08-14 NOTE — PROGRESS NOTES
"[facility] NEUROLOGY  GENERAL NEUROLOGY  FOLLOW-UP VISIT    CC: migraine with aura    INTERVAL HISTORY:  Guilherme Flaherty is a 38 y.o. woman with migraine with aura as well as insomnia, depression, and COVID-19 (on home O2).  I last saw her in the clinic on 5/10/2023.  At that time I recommended a trial of Aimovig 70 mg/month and Nurtec PRN.  Today, she was unaccompanied, and she provided the following interval history:    The following is a summary of headache symptoms, presented in my standard format:    Family History: mother, aunts, sisters, grandfather all have migraines  Age at onset: 9 years old  Location: bi-occipital, worse on the right  Radiation: anterior, retro-orbital (right worse than left)  Frequency: baseline headache: daily, lately: 3/week, pain wakes her up from sleep  Duration: all day, fades around 15:00-16:00  Headache Days/Month: baseline: , current:   Quality: variable: \"pounding, throbbing\"  Intensity: baseline: 10/10, lately: -8/10  Aura: visual (spots)  Photophobia/Phonophobia/Nausea/Vomiting: yes/yes/yes/yes  Provoked by Physical Activity?:   Triggers: none identified  Associated Symptoms:   Autonomic Signs (such as ptosis, miosis, conjunctival injection, rhinorrhea, increased lacrimation):   Head Trauma:   Association with Menses: none, s/p hysterectomy in   ED Visits:   Hospitalizations:   Missed Work Days (in-home ): none  Sleep: 7 hours/night  Caffeine Intake: 1 coffee/morning  Hydration: keeps well hydrated  Nutrition: skips breakfast  Exercise:   Analgesic Overuse:     Current Medication Regimen:  - Aimovi mg/month is helpful but causes \"really bad constipation\" for 1-2 weeks after each dose  - Nurtec: \"works so well,\" pain is gone within 30-60 minutes  - amitriptyline 25 mg: partially effective  - eletriptan: associated with fatigue     Medications Tried: Response  Preventive:  - topiramate: 50 mg caused side effects (falls, dropping objects)  - " amitriptyline: 50 mg was associated with intolerable dry mouth     Abortive:  - sumatriptan: 100 is helpful, but it caused sedation     Medications Not Tried:  - propranolol: will avoid due to presence of asthma    MEDICATIONS:  Current Outpatient Medications   Medication Sig    Fremanezumab-vfrm (AJOVY) 225 MG/1.5ML Solution Prefilled Syringe Inject 225 mg under the skin every 30 (thirty) days for 360 days.    Rimegepant Sulfate (NURTEC) 75 MG TABLET DISPERSIBLE Take 1 Tablet by mouth 1 time a day as needed (migraine) for up to 30 days.    valACYclovir (VALTREX) 500 MG Tab Take 1 Tablet by mouth every day.    fexofenadine (ALLEGRA) 180 MG tablet Take 180 mg by mouth every day.    Fluticasone Furoate-Vilanterol (BREO) 100-25 MCG/INH AEROSOL POWDER, BREATH ACTIVATED Inhale 1 Puff every day.    aspirin (ASA) 325 MG Tab Take 325 mg by mouth 2 times a day.    montelukast (SINGULAIR) 10 MG Tab Take 10 mg by mouth every day.    albuterol 108 (90 Base) MCG/ACT Aero Soln inhalation aerosol Inhale 2 Puffs every 6 hours as needed for Shortness of Breath.    phentermine (ADIPEX-P) 37.5 MG tablet Take 1 Tablet by mouth every day.     MEDICAL, SOCIAL, AND FAMILY HISTORY:  There is no change in the patient's ROS or medical, social, or family histories since the previous visit on 5/10/2023.    REVIEW OF SYSTEMS:  A ROS was completed.  Pertinent positives and negatives were included in the HPI, above.  All other systems were reviewed and are negative.    PHYSICAL EXAM:  General/Medical:  - NAD  - NC in place    Neuro:  MENTAL STATUS: awake and alert; no deficits of speech or language; oriented to person, place, and time; affect was appropriate to situation; pleasant, cooperative    CRANIAL NERVES:    II: acuity: NT, fields: NT, pupils: NT, discs: NT    III/IV/VI: versions: grossly intact    V: facial sensation: NT    VII: facial expression: symmetric    VIII: hearing: intact to voice    IX/X: palate: NT    XI: shoulder shrug: NT     XII: tongue: NT    MOTOR:  - bulk: NT  - tone: NT  Upper Extremity Strength  (R/L)    NT   Elbow flexion NT   Elbow extension NT   Shoulder abduction NT     Lower Extremity Strength  (R/L)   Hip flexion NT   Knee extension NT   Knee flexion NT   Ankle plantarflexion NT   Ankle dorsiflexion NT     - pronator drift: NT  - abnormal movements: none    SENSATION:  - light touch: NT  - vibration (R/L, seconds): NT at the great toes  - pinprick: NT  - proprioception: NT  - Romberg: absent    COORDINATION:  - finger to nose: NT  - finger tapping: NT    REFLEXES:  Reflex Right Left   BR NT NT   Biceps NT NT   Triceps NT NT   Patellae NT NT   Achilles NT NT   Toes NT NT     GAIT:  - NT    REVIEW OF IMAGING STUDIES:  No additional data since the last visit.    REVIEW OF LABORATORY STUDIES:  No additional data since the last visit.    ASSESSMENT:  Guilherme Flaherty is a 38 y.o. woman with chronic migraine with aura and a history otherwise notable for insomnia, depression, and COVID-19 (on home O2).  Plans/recommendations as follows:    PLAN:  Chronic Migraine w/ Aura:  Prevention:  - stop Aimovig (due to side effects of constipation)  - switch to Ajovy monthly  - continue amitriptyline 25 mg nightly for now (hope to reduce the dosage or discontinue completely if Aimovig is effective due to side effects)  - try supplementing:  - magnesium: 400-600 mg once or 200-300 mg twice daily  - riboflavin (vitamin B2): 400 mg once daily  - coenzyme Q10: 300 mg daily  - get 7-9 hours of sleep per night; can try supplementing melatonin 2-10 mg, 2-3 hours before bedtime  - drink plenty of fluids (urine should be nearly clear)  - avoid excessive caffeine intake (no more than 2 servings per day and nothing in the afternoon)  - eat regular meals (don't skip meals)  - get moderate exercise (even just a 20 minute walk daily)    Rescue:  - continue Nurtec 75 mg: take 1 tablet (75 mg) at the onset of aura/headache; limit 1 tablet (75 mg) in  24 hours; try to limit Nurtec to 1 tablet every 48 hours; do not dose Nurtec and any triptan within 24-hours of one another  - do not use analgesics (e.g., ibuprofen, acetaminophen) more than 2 days per week in order to avoid analgesic rebound headaches    - keep a headache log    Follow-Up:  - Return in about 1 year (around 8/14/2024).    Signed: Phil Stinson M.D.

## 2023-08-15 ENCOUNTER — TELEPHONE (OUTPATIENT)
Dept: NEUROLOGY | Facility: MEDICAL CENTER | Age: 38
End: 2023-08-15
Payer: COMMERCIAL

## 2023-08-15 NOTE — TELEPHONE ENCOUNTER
Ajovy 225mg/1.5ml SOSY    PA submitted via CMM Key: [id number] awaiting response TAT 24-72 hrs. - 08/15/2023 11:36am

## 2023-08-16 ENCOUNTER — TELEPHONE (OUTPATIENT)
Dept: NEUROLOGY | Facility: MEDICAL CENTER | Age: 38
End: 2023-08-16
Payer: COMMERCIAL

## 2023-08-16 NOTE — TELEPHONE ENCOUNTER
Ajovy 225mg/1.5ml SOSY    PA approved End date 02/15/2024 - TC paid copay $50.00 #1.5ml/30 DS Max 31 DS    PA approved request Reference Number: [id number]. AJOVY /1.5 is approved through 02/15/2024, #1.5/30 DS - TC paid copay $50.00 #1.5ml/30 DS Max 31 DS notifying liaison Jacki Horvath. - 08/16/2023 8:35am

## 2023-08-18 PROCEDURE — RXMED WILLOW AMBULATORY MEDICATION CHARGE: Performed by: PSYCHIATRY & NEUROLOGY

## 2023-09-01 ENCOUNTER — TELEPHONE (OUTPATIENT)
Dept: PHARMACY | Facility: MEDICAL CENTER | Age: 38
End: 2023-09-01
Payer: COMMERCIAL

## 2023-09-01 ENCOUNTER — PHARMACY VISIT (OUTPATIENT)
Dept: PHARMACY | Facility: MEDICAL CENTER | Age: 38
End: 2023-09-01
Payer: COMMERCIAL

## 2023-09-01 DIAGNOSIS — G43.109 MIGRAINE WITH AURA AND WITHOUT STATUS MIGRAINOSUS, NOT INTRACTABLE: ICD-10-CM

## 2023-09-02 NOTE — TELEPHONE ENCOUNTER
Contact:      Phone number: [phone], Mobile    Name of person spoken with and relationship to patient: Guilherme Flaherty, Self   Patient’s Adherence:      How patient is doing on medication: Good    How many missed doses and reason: 0, PRN    Any new medications: No    Any new conditions: No    Any new allergies: No    Any new side effects: No    Any new diagnoses: No    How many doses remainin tablets    Did patient want to speak with pharmacist: No  Delivery:      Delivery date and method: Declined refill of Nurtec    Needs by Date: N/A    Signature required: N/A    Any additional details for : N/A   Teach Appointment Date: N/A  Shipping Address: [facility] [address] [facility] [place] [address]  Medication (name, strength and dose): Nurtec 75mg tabs-PRN  Copay: N/A  Payment Method: N/A   Supplies: None  Additional Information: Pt confirmed receiving delivery for Ajovy on . She will not be using it until 09/10 as she will be continuing the same cycle as the Aimovig she was previously using. She has declined a refill of Nurtec at this time and has scheduled a follow up call with the next refill of Ajovy. Next call date: 10/03.

## 2023-09-05 RX ORDER — AMITRIPTYLINE HYDROCHLORIDE 25 MG/1
25 TABLET, FILM COATED ORAL NIGHTLY
Qty: 90 TABLET | Refills: 3 | Status: SHIPPED | OUTPATIENT
Start: 2023-09-05 | End: 2023-09-18

## 2023-09-07 ENCOUNTER — DOCUMENTATION (OUTPATIENT)
Dept: PHARMACY | Facility: MEDICAL CENTER | Age: 38
End: 2023-09-07
Payer: COMMERCIAL

## 2023-09-11 NOTE — PROGRESS NOTES
PHARMACIST PRE SCREEN - **New Onboarding**  Diagnosis: Migraine with aura and without status migrainosus, not intractable [G43.109]      Drug & Non-Drug Allergies: EMR Reviewed. No concerning drug or non-drug allergies.                                                                                          Drug Therapy (name/formulation/dose/route of admin/frequency): Ajovy 225mg/1.5mL solution in pre-filled syringe, 1 PFS SC Q 30 days   EMR Reviewed   - Dose Appropriateness (Y/N):  Y   - Renal or Hepatic Adjustments (Y/N):  N   - Any comorbidities, PMH, precautions, or contraindications that pose a medication safety concern? (Y/N):  N   - Any relevant lab work needed that affects the initial start date? (Y/N):      N   - List Past Treatments: amitriptyline; eletriptan; topiramate; sumatriptan; aimovig; nurtec    - List DDI’s: no clinically significant DDIs     - Patient’s ability to self-administer medication:  No issues identified per EMR   List Goals of Therapy:    To reduce migraine frequency, duration, and severity    To improve acute medication responsiveness and reduce the need for acute attacks    To identify and modify migraine triggers    PHARMACIST NEW START - Initial Assessment  Dx: Migraine with aura and without status migrainosus, not intractable [G43.109]     Tx prescribed: Ajovy 225mg/1.5mL solution in pre-filled syringe, SC Q 30 days     Administration: Ajovy 225mg, 1 PFS SC Q 30 days     ? Injection Prep: Collect all supplies to prepare for admin take Ajovy out of fridge and inspect the solution. It should be clear to colorless to slightly yellow. Do not use if any discoloration, particles, cloudy or has been frozen. Do not use if you see any irregularities. Wash hands well with soap and water and dry. Selecting rotating injection sites of top of thigh or abdomen and clean with alcohol. Skin should be free from any irregularities (e.g. bruises, rash, bug bits, sunburn, birthmarks, tattoos, stretch  marks and/or scars).    ? Injecting: When ready for admin uncap and use immediately. Pinch injection site and insert needle at a 45 deg angle. Press down on the plunger slowly and steadily while keeping the needle in place. Once complete withdraw needle from body and discard; do not try to recap.    Proper Handling/Disposal: Set Ajovy down in a safe spot and allow to room temp over 30 minutes.  Do not carry, shake, hold, or heat by carrying, shaking, holding and/or any other means during this time. Discard into sharps container after admin complete, once container is full to bring to needle exchange/disposal program. Can go to [facility] disposal drop off center located at [address] in [place]; open Monday - Friday 7am to 3:30pm    Storage/Excursion: Store Ajovy in fridge, protect from heat, light, and freezing. Once brought to room temp not return to refrigeration, can be kept at room temp (max of 86F) for up to 7 days.    Duration of therapy:  until progression, toxicity, or no longer clinically beneficial  Adherence & Potential Barriers: Confirmed receipt of Ajovy and will be starting on 9/10 when next Aimovig dose would've been due. Previously was taking Aimovig every 10th of the month and tracking on a calendar.     Missed dose mgmt: Inject dose ASAP if within 2 days of typical dosing time and can maintain current dosing schedule. If it is has been more than 2 days, to dose ASAP; next dose to fall on the same numerical day as makeup dose (i.e. schedule will shift).   List Common, Serious SE & Mitigation Reviewed:     Injection Site Reactions (swelling, bruising, pain, irritation): ice before/after admin; avoiding/limiting touching inj site after; inj site rotation; top cortisone; oral non-drowsy antihistamine  List Precautions Reviewed:     Hypersensitivity: Report to MD if any presentation/development of: hives; fever; joint pain; swelling of the tongue/face/lips; SOB/difficulty breathing.  List  "Current SE Reviewed (if applicable): n/a has not started med     Mitigation/mgmt: n/a  S/Sx: daily HA has subsided and IBU effective but continues to limit use when possible.  She's been able to \"catch\" her migraines quicker as her progression  is pain > nausea > vision changes. Once she starts feeling nausea she'll take the nurtec immediately. Nurtec \"works amazing,\" will terminate a migraine within 30 to 60 minutes and able to continue with her day.    How many migraine days in the past month?  1- 2    Migraine Pain severity: always severe    Avg duration of migraine in past month: 30 - 60 minutes with Nurtec use   Other Pertinent Migraine Med:     Amitriptyline   Acute Episodic Medication Use:    IBU    Nurtec  Clinically Relevant, Abnormal Labs:    1. CBC: no recent    2. CHEM 7: no recent     a. CRCL/GFR: no recent     b. LFTs/TBili: no recent    3. BP/HR: (8/14/23) wnl   Wellness/Lifestyle Counseling: deferred**    Immunizations: deferred**    ADLs: no days missed  Med Rec/Updated drug list: EMR accurate, no medication list inaccuracies. Reviewed with patient/caregiver.   DI Check:  no clinically significant DDIs   Common DI & Dietary Avoidance: Reviewed to not start any new meds/herbs/OTCs/supplements without speaking to clinic/pharmacist to check for DDIs. Confirms she tries to limit NSAID use when possible d/t daily ASA use and limit rebound HA.   Goals of Therapy:     - To reduce migraine frequency, duration, and severity   - To improve acute medication responsiveness and reduce the need for acute attacks   - To identify and modify migraine triggers  Patient has agreed/understands to goals of therapy during education/counseling    Additional: Reached Guilherme to review transition from Aimovig to Ajovy. Advised if she has difficulty with the PFS to let us know and we can seek change to auto-injector in the future. She had great improvements of migraine control on Aimovig, however found the constipation " intolerable. Shared there should be minimal interruption in her migraine control but there are instances patients respond to one agent better than another within the same family. She thanked me for the information, agreeable to tolerability check in after first dose of Ajovy.

## 2023-09-18 ENCOUNTER — DOCUMENTATION (OUTPATIENT)
Dept: PHARMACY | Facility: MEDICAL CENTER | Age: 38
End: 2023-09-18
Payer: COMMERCIAL

## 2023-09-19 NOTE — PROGRESS NOTES
PHARMACIST FOLLOW UP - First Cycle    Dx:  Migraine with aura and without status migrainosus, not intractable [G43.109]     Txt review: Ajovy 225mg/1.5mL solution in pre-filled syringe, SC Q 30 days     Administration:  Ajovy 225mg, 1 PFS SC Q 30 days. First injection into top of thigh. Shared she had some difficulty d/t the length of the needle and prefers the pen device moving forward. Further clarified with the pen device she'll have more options of where to inject - avoids stomach d/t stretch marks. Hopes with the pen devices can have her  administer to her arm   Adherence: started on 9/10, confirmed next dose will be on 10/10 and the 10th of every month moving forward.     Missed dose mgmt: none  Current SE: none    Mitigation/Mgmt: n/a  List Changes to Allergies, Diagnoses: none    Wellness/Lifestyle Counseling:    - Support/QOL:  doing ok    - Immunizations: declines vaccination, had a bad reaction as a child to a number of vaccines.      - Triggers: no known; has tried food/sleep journaling and found no correlation. Aware common triggers such as lack of sleep/food/hydration and increased stress can contribute. Makes an effort to take a daily walk for at least 30 minutes/day to help her decompress. Does not smoke or drink alcohol; consumes caffeine - advised to not stop abruptly to avoid rebound HA.   Med Rec/Updated drug list: No changes since last assessment, reviewed with patient/caregiver.     Additional: Had a pleasant talk Guilherme who shared her first injection of Ajovy wasn't too bad but did have a slightly hard time injecting into her leg d/t the needle length and muscular legs. Shared her  is not comfortable with utilizing PFS so we resolved to seek a change to Ajovy auto-injectors with next refill. Did have 2 migraines in the past week which she shared is uncharacteristic for her but will continue to monitor for now. No other concerns and welcomes future calls from clinical [facility] team.

## 2023-10-02 ENCOUNTER — TELEPHONE (OUTPATIENT)
Dept: PHARMACY | Facility: MEDICAL CENTER | Age: 38
End: 2023-10-02
Payer: COMMERCIAL

## 2023-10-02 PROCEDURE — RXMED WILLOW AMBULATORY MEDICATION CHARGE: Performed by: PSYCHIATRY & NEUROLOGY

## 2023-10-03 NOTE — TELEPHONE ENCOUNTER
Contact:      Phone number: [phone], Mobile    Name of person spoken with and relationship to patient: Guilherme Flaherty, Self   Patient’s Adherence:      How patient is doing on medication: Good    How many missed doses and reason: 0    Any new medications: No    Any new conditions: No    Any new allergies: No    Any new side effects: No    Any new diagnoses: No    How many doses remainin-Ajovy, unsure, as it is stored in multiple places-Nurtec    Did patient want to speak with pharmacist: No  Delivery:      Delivery date and method: 10/05 via     Needs by Date: 10/10    Signature required: No    Any additional details for : None   Teach Appointment Date: N/A  Shipping Address: [facility] [place] [address]  Medication (name, strength and dose): Ajovy 225mg/1.5mL Sosy, Nurtec 75mg tabs  Copay: $5  Payment Method: CCOF  Supplies: None  Additional Information: Pt noticed a increase in migraines. She reports not having more than one per month but recently had them ever two days, with no symptoms. She stated she had previously requested for a new prescription for the autoinjector as it is sometimes difficult and painful to inject in the thigh as the stomach is not an option and the back of the arm is in accessible. Next call date: .

## 2023-10-05 ENCOUNTER — PHARMACY VISIT (OUTPATIENT)
Dept: PHARMACY | Facility: MEDICAL CENTER | Age: 38
End: 2023-10-05
Payer: COMMERCIAL

## 2023-10-09 DIAGNOSIS — G43.109 MIGRAINE WITH AURA AND WITHOUT STATUS MIGRAINOSUS, NOT INTRACTABLE: ICD-10-CM

## 2023-10-09 RX ORDER — FREMANEZUMAB-VFRM 225 MG/1.5ML
225 INJECTION SUBCUTANEOUS
Qty: 1.5 ML | Refills: 0 | Status: SHIPPED | OUTPATIENT
Start: 2023-10-09 | End: 2023-11-17 | Stop reason: SDUPTHER

## 2023-10-10 ENCOUNTER — TELEPHONE (OUTPATIENT)
Dept: NEUROLOGY | Facility: MEDICAL CENTER | Age: 38
End: 2023-10-10
Payer: COMMERCIAL

## 2023-10-10 PROCEDURE — RXMED WILLOW AMBULATORY MEDICATION CHARGE: Performed by: PSYCHIATRY & NEUROLOGY

## 2023-10-31 ENCOUNTER — PHARMACY VISIT (OUTPATIENT)
Dept: PHARMACY | Facility: MEDICAL CENTER | Age: 38
End: 2023-10-31
Payer: COMMERCIAL

## 2023-11-01 ENCOUNTER — TELEPHONE (OUTPATIENT)
Dept: PHARMACY | Facility: MEDICAL CENTER | Age: 38
End: 2023-11-01
Payer: COMMERCIAL

## 2023-11-01 DIAGNOSIS — G43.109 MIGRAINE WITH AURA AND WITHOUT STATUS MIGRAINOSUS, NOT INTRACTABLE: ICD-10-CM

## 2023-11-02 ENCOUNTER — TELEPHONE (OUTPATIENT)
Dept: PHARMACY | Facility: MEDICAL CENTER | Age: 38
End: 2023-11-02
Payer: COMMERCIAL

## 2023-11-02 ENCOUNTER — PHARMACY VISIT (OUTPATIENT)
Dept: PHARMACY | Facility: MEDICAL CENTER | Age: 38
End: 2023-11-02
Payer: COMMERCIAL

## 2023-11-02 PROCEDURE — RXMED WILLOW AMBULATORY MEDICATION CHARGE: Performed by: PSYCHIATRY & NEUROLOGY

## 2023-11-02 RX ORDER — AMITRIPTYLINE HYDROCHLORIDE 25 MG/1
25 TABLET, FILM COATED ORAL NIGHTLY
Qty: 90 TABLET | Refills: 3 | Status: SHIPPED | OUTPATIENT
Start: 2023-11-02 | End: 2023-12-29

## 2023-11-02 NOTE — TELEPHONE ENCOUNTER
Contact:      Phone number: [phone], Mobile    Name of person spoken with and relationship to patient: Yonatanchemalatanya Flaherty, Self   Patient’s Adherence:      How patient is doing on medication: Good    How many missed doses and reason: 0    Any new medications: No    Any new conditions: No    Any new allergies: No    Any new side effects: Yes, Redness at injection site    Any new diagnoses: No    How many doses remainin dose ()-Ajovy, 7 tabs-Nurtec    Did patient want to speak with pharmacist: No  Delivery:      Delivery date and method: 10/31 via     Needs by Date:     Signature required: No    Any additional details for : None   Teach Appointment Date: N/A  Shipping Address: [facility] [place] [address]  Medication (name, strength and dose): Ajovy 225mg/1.5mL SOAJ  Copay: $5  Payment Method: CCOF  Supplies: None  Additional Information: Pt confirmed receiving delivery of Ajovy but will double check to see if it was the syringe or autoinjector that shows dispensed. She reports during her last injection of Ajovy she tried to stand while injecting medication in her thigh per the nurses recommendation. She forgot that she gets vertigo and let go of the pinched area of skin while injecting. She states it left a large bruise on the injection site with mild itching. It has now been about 2 weeks, the bruise has since resolved but she said there is a red patch/discoloration that doesn't seem to be resolving or doesn't seem to be fading. Sent a [facility] as patient no longer wants to fill with [facility]. She has declined a refill of Nurtec at this time and scheduled the next call date with the next Ajovy refill. Next call date: .

## 2023-11-02 NOTE — TELEPHONE ENCOUNTER
Spoke to Guilherme regarding the prescription renewal for Amitriptyline. We have scheduled a refill of Amitriptyline 25mg tabs #30/30ds/$5 to be sent out today. Confirmed $5 copay okay to charge to [facility] and delivered to [address] [id number].

## 2023-11-17 DIAGNOSIS — G43.109 MIGRAINE WITH AURA AND WITHOUT STATUS MIGRAINOSUS, NOT INTRACTABLE: ICD-10-CM

## 2023-11-21 ENCOUNTER — TELEPHONE (OUTPATIENT)
Dept: PHARMACY | Facility: MEDICAL CENTER | Age: 38
End: 2023-11-21
Payer: COMMERCIAL

## 2023-11-21 PROCEDURE — RXMED WILLOW AMBULATORY MEDICATION CHARGE: Performed by: PSYCHIATRY & NEUROLOGY

## 2023-11-21 RX ORDER — FREMANEZUMAB-VFRM 225 MG/1.5ML
225 INJECTION SUBCUTANEOUS
Qty: 1.5 ML | Refills: 11 | Status: SHIPPED | OUTPATIENT
Start: 2023-11-21 | End: 2024-01-24

## 2023-11-21 NOTE — TELEPHONE ENCOUNTER
Received request via: Pharmacy    Was the patient seen in the last year in this department? Yes   Date of last office visit 8/14/23     Per last Neurology Office Visit, when was the date of next follow up visit set for?    1 yr                        Date of office visit follow up request 8/14/23     Does the patient have an upcoming appointment? No   If yes, when?     Does the patient have an active prescription (recently filled or refills available) for medication(s) requested? No    Does the patient have residential Plus and need 100 day supply (blood pressure, diabetes and cholesterol meds only)? Medication is not for cholesterol, blood pressure or diabetes

## 2023-11-22 ENCOUNTER — PHARMACY VISIT (OUTPATIENT)
Dept: PHARMACY | Facility: MEDICAL CENTER | Age: 38
End: 2023-11-22
Payer: COMMERCIAL

## 2023-11-22 NOTE — TELEPHONE ENCOUNTER
Contact:      Phone number: [phone], Mobile    Name of person spoken with and relationship to patient: Guilherme Flaherty, Self  Patient’s Adherence:      How patient is doing on medication: Good    How many missed doses and reason: 0    Any new medications: No    Any new conditions: No    Any new allergies: No    Any new side effects: No    Any new diagnoses: No    How many doses remainin-Ajovy, 10 tabs-Nurtec    Did patient want to speak with pharmacist: No  Delivery:      Delivery date and method:  via     Needs by Date:     Signature required: No    Any additional details for : None   Teach Appointment Date: N/A  Shipping Address: [facility] [address] [place] [address]  Medication (name, strength and dose): Ajovy 225mg/1.5mL, Nurtec 75mg tabs  Copay: $5  Payment Method: CCOF  Supplies: None  Additional Information: Next call date: .

## 2023-12-13 ENCOUNTER — DOCUMENTATION (OUTPATIENT)
Dept: PHARMACY | Facility: MEDICAL CENTER | Age: 38
End: 2023-12-13
Payer: COMMERCIAL

## 2023-12-15 NOTE — PROGRESS NOTES
===CLINICAL INTERVENTION - PATIENT==  Spoke with Guilherme, confirmed she had received the Ajovy pen and will be dosing on 12/16. She plans to try injecting on the outside of her thigh to see if there's less of a reaction; feels comfortable with using pen injectors but shared if she continues to have a reaction they will be stopping therapy. Reviewed if she uncaps to ensure she uses immediately and if she tries to recap the pen it might fire. Action/start button is located at the needle end of the pen so once she presses into her leg hard enough it will click to start. Advised the spring action of the needle stick might be more irritating and to ensure she holds the pen in place at a 90 degree angle until there's a 2nd click heard. She thanked me for the review, agreed to a brief check in on tolerability after next dose.

## 2023-12-16 DIAGNOSIS — G43.109 MIGRAINE WITH AURA AND WITHOUT STATUS MIGRAINOSUS, NOT INTRACTABLE: ICD-10-CM

## 2023-12-18 ENCOUNTER — TELEPHONE (OUTPATIENT)
Dept: NEUROLOGY | Facility: MEDICAL CENTER | Age: 38
End: 2023-12-18
Payer: COMMERCIAL

## 2023-12-18 RX ORDER — RIMEGEPANT SULFATE 75 MG/75MG
75 TABLET, ORALLY DISINTEGRATING ORAL
Qty: 8 TABLET | Refills: 5 | Status: SHIPPED | OUTPATIENT
Start: 2023-12-18 | End: 2024-03-27

## 2023-12-18 NOTE — TELEPHONE ENCOUNTER
Received request via: Pharmacy    Was the patient seen in the last year in this department? Yes   Date of last office visit 8.14.23     Per last Neurology Office Visit, when was the date of next follow up visit set for?                            Date of office visit follow up request 8.14.24     Does the patient have an upcoming appointment? No   If yes, when?     Does the patient have an active prescription (recently filled or refills available) for medication(s) requested? No    Does the patient have [facility] Plus and need 100 day supply (blood pressure, diabetes and cholesterol meds only)? Medication is not for cholesterol, blood pressure or diabetes

## 2023-12-18 NOTE — TELEPHONE ENCOUNTER
Received Refill PA request via [facility]  for Nurtec 75 MG TBDP. (Quantity:8, Day Supply:30) - Copay $0.00 After $50.00 MFG Voucher (please mention voucher to patient) PA previously approved end date 08/10/2024     Insurance: [facility]  Member ID:  [id number]  BIN: [id number]  PCN: [id number]  Group: [id number]     Ran Test claim via [place] & medication Pays for a $0.00 copay. Will outreach to patient to offer specialty pharmacy services and or release to preferred pharmacy       Yes, Non-Core measure site...

## 2023-12-22 PROCEDURE — RXMED WILLOW AMBULATORY MEDICATION CHARGE: Performed by: PSYCHIATRY & NEUROLOGY

## 2023-12-26 NOTE — TELEPHONE ENCOUNTER
2023 4:26 PM  (NE1) REFILL - Contact:            Phone number: [phone]    Name of person spoken with and relationship to patient: Guilherme meneses  Patient’s Adherence:            How patient is doing on medication: doing well on ajovy and nurtec. she had switched from syringe to auto injector and does like autoinjector more. but still having same site reaction. physician is aware and they are working on follow up.     How many missed doses and reason: 0    Any new medications: No    Any new conditions: No    Any new allergies: No    Any new side effects: No    Any new diagnoses: No    How many doses remainin ajovy/5 nurtec    Did patient want to speak with pharmacist: No  Delivery:            Delivery date and method:  PD     Needs by Date:     Signature required: NO    Any additional details for :  Teach Appointment Date:  Shipping Address: [facility] [address] [facility] [address]  Medication (name, strength and dose): Ajovy 225mg/1.5ml 1.5ml/30ds; Nurtec 75mg #8tab/15ds take prn  Copay: $5  Payment Method: CCOF 7008  Supplies: None  Additional Information: She liked the autoinjector more than the previous syringes but still has the inj site rash/bruising provider already aware they are scheduling a follow up -     Next call date: 24

## 2023-12-27 ENCOUNTER — DOCUMENTATION (OUTPATIENT)
Dept: PHARMACY | Facility: MEDICAL CENTER | Age: 38
End: 2023-12-27
Payer: COMMERCIAL

## 2023-12-27 DIAGNOSIS — G43.109 MIGRAINE WITH AURA AND WITHOUT STATUS MIGRAINOSUS, NOT INTRACTABLE: ICD-10-CM

## 2023-12-28 ENCOUNTER — PHARMACY VISIT (OUTPATIENT)
Dept: PHARMACY | Facility: MEDICAL CENTER | Age: 38
End: 2023-12-28
Payer: COMMERCIAL

## 2023-12-28 NOTE — TELEPHONE ENCOUNTER
DANIEL PT    Received request via: Pharmacy    Was the patient seen in the last year in this department? Yes   Date of last office visit 8/14/23     Per last Neurology Office Visit, when was the date of next follow up visit set for?                            Date of office visit follow up request 8/14/24     Does the patient have an upcoming appointment? No   If yes, when?     Does the patient have an active prescription (recently filled or refills available) for medication(s) requested? No    Does the patient have care home Plus and need 100 day supply (blood pressure, diabetes and cholesterol meds only)? Medication is not for cholesterol, blood pressure or diabetes

## 2023-12-28 NOTE — PROGRESS NOTES
=== CLINICAL INTERVENTION - PATIENT ===    Reached out to Guilherme for brief check-in on recent Ajovy auto-injector change; dosed on 12/16. Shared admin went smoother than the pre-filled syringe but continued to have the same injection site reaction as before. Within minutes had a red itchy patch; bruising is just starting to fade after 11 days and itching/rash still present. She shared she feels they will be changing to Botox in the near future; currently on the cancellation list to see Dr. Shantell LENNON. Was advised to not stop Ajovy until she sees him; a plan she is agreeable to since Ajovy continues to provide good headache/migraine control and she would prefer to tolerate the injection site reaction over daily headaches and severe migraines.     She is concerned about conversion to Botox if there will be similar injection site reactions and bruising as she doesn't want to have these reactions on her head/face/neck. Continues to take Aspirin twice daily and aware she is prone to bruising as a result. I did advise injection site reactions are less likely with Botox and the amount that is used is distributed across various points on her head vs the Ajovy which has 1.5mL of medication administered to one spot. The needle used to administer botox is finer as well, which also helps reduce the risks of injection site reactions. She was thankful for the call and information, just awaiting next steps with Dr. Stinson.

## 2023-12-29 RX ORDER — AMITRIPTYLINE HYDROCHLORIDE 25 MG/1
TABLET, FILM COATED ORAL
Qty: 30 TABLET | Refills: 0 | Status: SHIPPED | OUTPATIENT
Start: 2023-12-29 | End: 2024-02-02

## 2024-01-03 NOTE — TELEPHONE ENCOUNTER
Caller: JEWEL YO    Relationship: Emergency Contact    Best call back number: [phone]    What medication are you requesting: DISSOLVABLE ZOFRAN     What are your current symptoms: CONTINUOUS VOMITING    How long have you been experiencing symptoms: 8 OR 9 AM THIS MORNING    Have you had these symptoms before:    [x] Yes  [] No    Have you been treated for these symptoms before:   [x] Yes  [] No    If a prescription is needed, what is your preferred pharmacy and phone number: [address] [phone] [place] [phone]      Additional notes: STATED THAT THE PATIENT WAS IN THE HOSPITAL FOR DEHYDRATION AROUND [place]. STATED THAT THEY HAD ASKED FOR THE DISSOLVABLE ZOFRAN BUT WERE GIVEN THE TABLETS. STATED THAT THEY HAVE RESTARTED THE VOMITING THEY HAD WHEN THEY WENT TO THE HOSPITAL. STATED THAT THEY WOULD LIKE TO HAVE THIS CALLED IN TO BE AHEAD OF IT BEFORE THE SYMPTOMS WORSEN. PLEASE CALL AND ADVISE        Called patient she advised she is okay and is just napping I advised he to make sure her O2 is on

## 2024-01-23 ENCOUNTER — OFFICE VISIT (OUTPATIENT)
Dept: NEUROLOGY | Facility: MEDICAL CENTER | Age: 39
End: 2024-01-23
Attending: PSYCHIATRY & NEUROLOGY
Payer: COMMERCIAL

## 2024-01-23 VITALS
HEIGHT: 61 IN | OXYGEN SATURATION: 92 % | RESPIRATION RATE: 17 BRPM | HEART RATE: 109 BPM | TEMPERATURE: 98.2 F | SYSTOLIC BLOOD PRESSURE: 142 MMHG | WEIGHT: 240 LBS | DIASTOLIC BLOOD PRESSURE: 84 MMHG | BODY MASS INDEX: 45.31 KG/M2

## 2024-01-23 DIAGNOSIS — G43.E09 CHRONIC MIGRAINE WITH AURA WITHOUT STATUS MIGRAINOSUS, NOT INTRACTABLE: ICD-10-CM

## 2024-01-23 PROCEDURE — 3079F DIAST BP 80-89 MM HG: CPT | Performed by: PSYCHIATRY & NEUROLOGY

## 2024-01-23 PROCEDURE — 99212 OFFICE O/P EST SF 10 MIN: CPT | Performed by: PSYCHIATRY & NEUROLOGY

## 2024-01-23 PROCEDURE — 3077F SYST BP >= 140 MM HG: CPT | Performed by: PSYCHIATRY & NEUROLOGY

## 2024-01-23 PROCEDURE — 99214 OFFICE O/P EST MOD 30 MIN: CPT | Performed by: PSYCHIATRY & NEUROLOGY

## 2024-01-23 ASSESSMENT — PATIENT HEALTH QUESTIONNAIRE - PHQ9: CLINICAL INTERPRETATION OF PHQ2 SCORE: 0

## 2024-01-23 NOTE — PROGRESS NOTES
"[facility] NEUROLOGY  GENERAL NEUROLOGY  FOLLOW-UP VISIT    CC: migraine with aura    INTERVAL HISTORY:  Guilherme Flaherty is a 38 y.o. woman with migraine with aura as well as insomnia, depression, and COVID-19 (on home O2).  I last saw her in the clinic on 8/14/2023.  At that time I recommended she switch from Aimovig to Ajovy, continue amitriptyline, and continue Nurtec PRN.  Today, she was unaccompanied, and she provided the following interval history:    The following is a summary of headache symptoms, presented in my standard format:    Family History: mother, aunts, sisters, grandfather all have migraines  Age at onset: 9 years old  Location: bi-occipital, worse on the right  Radiation: anterior, retro-orbital (right worse than left)  Frequency: baseline headache: daily, lately: 3/week, pain wakes her up from sleep  Duration: all day, fades around 15:00-16:00  Headache Days/Month: baseline: 30/30, current: see \"frequency\" above  Quality: variable: \"pounding, throbbing\"  Intensity: baseline: 10/10, lately: 7-8/10  Aura: visual (spots)  Photophobia/Phonophobia/Nausea/Vomiting: yes/yes/yes/yes  Provoked by Physical Activity?:   Triggers: none identified  Associated Symptoms:   Autonomic Signs (such as ptosis, miosis, conjunctival injection, rhinorrhea, increased lacrimation):   Head Trauma:   Association with Menses: none, s/p hysterectomy in 2008  ED Visits:   Hospitalizations:   Missed Work Days (in-home ): none  Sleep: 7 hours/night  Caffeine Intake: 1 coffee/morning  Hydration: keeps well hydrated  Nutrition: skips breakfast  Exercise:   Analgesic Overuse:     Current Medication Regimen:  - Nurtec: \"works so well,\" pain is gone within 30-60 minutes  - amitriptyline 25 mg: partially effective  - eletriptan: associated with fatigue     Medications Tried: Response  Preventive:  - topiramate: 50 mg caused side effects (falls, dropping objects)  - amitriptyline: 50 mg was associated with intolerable " "dry mouth  - Aimovi mg/month is helpful but causes \"really bad constipation\" for 1-2 weeks after each dose  - Ajovy: syringe was associated with injection site reactions, 1 day after dosing auto-injector there is a severe migraine    Abortive:  - sumatriptan: 100 is helpful, but it caused sedation     Medications Not Tried:  - propranolol: will avoid due to presence of asthma    MEDICATIONS:  Current Outpatient Medications   Medication Sig    amitriptyline (ELAVIL) 25 MG Tab TAKE 1 TABLET BY MOUTH IN THE EVENING FOR 30 DAYS    Fremanezumab-vfrm (AJOVY) 225 MG/1.5ML Solution Auto-injector Inject 225 mg under the skin every 30 (thirty) days for 30 days.    valACYclovir (VALTREX) 500 MG Tab Take 1 Tablet by mouth every day.    fexofenadine (ALLEGRA) 180 MG tablet Take 180 mg by mouth every day.    Fluticasone Furoate-Vilanterol (BREO) 100-25 MCG/INH AEROSOL POWDER, BREATH ACTIVATED Inhale 1 Puff every day.    aspirin (ASA) 325 MG Tab Take 325 mg by mouth 2 times a day.    montelukast (SINGULAIR) 10 MG Tab Take 10 mg by mouth every day.    albuterol 108 (90 Base) MCG/ACT Aero Soln inhalation aerosol Inhale 2 Puffs every 6 hours as needed for Shortness of Breath.     MEDICAL, SOCIAL, AND FAMILY HISTORY:  There is no change in the patient's ROS or medical, social, or family histories since the previous visit on 2023.    REVIEW OF SYSTEMS:  A ROS was completed.  Pertinent positives and negatives were included in the HPI, above.  All other systems were reviewed and are negative.    PHYSICAL EXAM:  General/Medical:  - NAD  - NC in place    Neuro:  MENTAL STATUS: awake and alert; no deficits of speech or language; oriented to person, place, and time; affect was appropriate to situation; pleasant, cooperative    CRANIAL NERVES:    II: acuity: NT, fields: NT, pupils: NT, discs: NT    III/IV/VI: versions: grossly intact    V: facial sensation: NT    VII: facial expression: symmetric    VIII: hearing: intact to voice    " IX/X: palate: NT    XI: shoulder shrug: NT    XII: tongue: NT    MOTOR:  - bulk: NT  - tone: NT  Upper Extremity Strength  (R/L)    NT   Elbow flexion NT   Elbow extension NT   Shoulder abduction NT     Lower Extremity Strength  (R/L)   Hip flexion NT   Knee extension NT   Knee flexion NT   Ankle plantarflexion NT   Ankle dorsiflexion NT     - pronator drift: NT  - abnormal movements: none    SENSATION:  - light touch: NT  - vibration (R/L, seconds): NT at the great toes  - pinprick: NT  - proprioception: NT  - Romberg: absent    COORDINATION:  - finger to nose: NT  - finger tapping: NT    REFLEXES:  Reflex Right Left   BR NT NT   Biceps NT NT   Triceps NT NT   Patellae NT NT   Achilles NT NT   Toes NT NT     GAIT:  - NT    REVIEW OF IMAGING STUDIES:  No additional data since the last visit.    REVIEW OF LABORATORY STUDIES:  No additional data since the last visit.    ASSESSMENT:  Guilherme Flaherty is a 38 y.o. woman with chronic migraine with aura and a history otherwise notable for insomnia, depression, and COVID-19 (on home O2).  Plans/recommendations as follows:    PLAN:  Chronic Migraine w/ Aura:  Prevention:  - stop Ajovy (side effects)  - start Botox: plan to inject 155 units according to the dosing/injection paradigm currently mandated by the FDA for chronic migraine as follows:  - 10 units of BOTOX divided into 2 sites into the   - 5 units into the Procerus  - 20 units of Botox divided into 4 units into the Frontalis  - 40 units of Botox divided into 8 sites (4 sites to the right Temporalis and 4 sites to the left Temporalis)  - 30 units divided into 6 units (3 units to the right Occipitalis and 3 units to left Occipitalis)  - 20 units divided into 4 units (2 units to the right Cervical paraspinals, 2 units to the left Cervical paraspinals)  - 30 units of Botox divided into 6 units (3 units to right trapezius, 3 units to the left trapezius).    - continue amitriptyline 25 mg nightly for  now (hope to reduce the dosage or discontinue completely if Botox is effective due to side effects)  - try supplementing:  - magnesium: 400-600 mg once or 200-300 mg twice daily  - riboflavin (vitamin B2): 400 mg once daily  - coenzyme Q10: 300 mg daily  - get 7-9 hours of sleep per night; can try supplementing melatonin 2-10 mg, 2-3 hours before bedtime  - drink plenty of fluids (urine should be nearly clear)  - avoid excessive caffeine intake (no more than 2 servings per day and nothing in the afternoon)  - eat regular meals (don't skip meals)  - get moderate exercise (even just a 20 minute walk daily)    Rescue:  - continue Nurtec 75 mg: take 1 tablet (75 mg) at the onset of aura/headache; limit 1 tablet (75 mg) in 24 hours; try to limit Nurtec to 1 tablet every 48 hours; do not dose Nurtec and any triptan within 24-hours of one another  - do not use analgesics (e.g., ibuprofen, acetaminophen) more than 2 days per week in order to avoid analgesic rebound headaches    - keep a headache log    Follow-Up:  - Return in about 5 months (around 6/23/2024).    Signed: Phil Stinson M.D.

## 2024-01-31 ENCOUNTER — TELEPHONE (OUTPATIENT)
Dept: PHARMACY | Facility: MEDICAL CENTER | Age: 39
End: 2024-01-31
Payer: COMMERCIAL

## 2024-01-31 DIAGNOSIS — G43.109 MIGRAINE WITH AURA AND WITHOUT STATUS MIGRAINOSUS, NOT INTRACTABLE: ICD-10-CM

## 2024-01-31 PROCEDURE — RXMED WILLOW AMBULATORY MEDICATION CHARGE: Performed by: PSYCHIATRY & NEUROLOGY

## 2024-01-31 NOTE — TELEPHONE ENCOUNTER
Contact:      Phone number: [phone], Mobile    Name of person spoken with and relationship to patient: Guilherme Flaherty, Self   Patient’s Adherence:      How patient is doing on medication: Good    How many missed doses and reason: 0, PRN    Any new medications: No    Any new conditions: No    Any new allergies: No    Any new side effects: No    Any new diagnoses: No    How many doses remainin-8 tabs of Nurtec    Did patient want to speak with pharmacist: No  Delivery:      Delivery date and method:  via     Needs by Date:     Signature required: No    Any additional details for : None   Teach Appointment Date: N/A  Shipping Address: [facility] [place] [address] [place] [address]  Medication (name, strength and dose): (Pending Rx) Amitriptyline 25mg tabs, Nurtec 75mg tabs  Copay: $5 (est.)  Payment Method: CCOF  Supplies: None  Additional Information: Pt reports the discontinuation of Ajovy and will be starting Botox injections. Next call date: .

## 2024-02-01 NOTE — TELEPHONE ENCOUNTER
Received request via: Pharmacy    Medication Name/Dosage amitriptyline (ELAVIL) 25 MG Tab     When was medication last prescribed 12/29/23    How many refills were previously provided 0    How many Refills does he patient have left from last prescription 0    Was the patient seen in the last year in this department? Yes   Date of last office visit 1/23/24     Per last Neurology Office Visit, when was the date of next follow up visit set for?                            Date of office visit follow up request 6/23/24     Does the patient have an upcoming appointment? Yes   If yes, when 6/25/24             If no, schedule appointment     Does the patient have [facility] Plus and need 100 day supply (blood pressure, diabetes and cholesterol meds only)? Medication is not for cholesterol, blood pressure or diabetes

## 2024-02-02 ENCOUNTER — PHARMACY VISIT (OUTPATIENT)
Dept: PHARMACY | Facility: MEDICAL CENTER | Age: 39
End: 2024-02-02
Payer: COMMERCIAL

## 2024-02-02 PROCEDURE — RXMED WILLOW AMBULATORY MEDICATION CHARGE: Performed by: PSYCHIATRY & NEUROLOGY

## 2024-02-02 RX ORDER — AMITRIPTYLINE HYDROCHLORIDE 25 MG/1
25 TABLET, FILM COATED ORAL NIGHTLY
Qty: 90 TABLET | Refills: 3 | Status: SHIPPED | OUTPATIENT
Start: 2024-02-02 | End: 2025-01-27

## 2024-02-16 ENCOUNTER — TELEPHONE (OUTPATIENT)
Dept: NEUROLOGY | Facility: MEDICAL CENTER | Age: 39
End: 2024-02-16
Payer: COMMERCIAL

## 2024-02-16 NOTE — TELEPHONE ENCOUNTER
VOICEMAIL  1. Caller Name: Guilherme                                                 Call Back Number: [phone]    2. Message: Guilherme called because she has questions regarding Botox. Please return her call.     3. Patient approves office to leave a detailed voicemail/MyChart message: N\A    Routed to Michell MONCADA

## 2024-03-04 ENCOUNTER — OFFICE VISIT (OUTPATIENT)
Dept: NEUROLOGY | Facility: MEDICAL CENTER | Age: 39
End: 2024-03-04
Attending: PSYCHIATRY & NEUROLOGY
Payer: COMMERCIAL

## 2024-03-04 VITALS
WEIGHT: 250 LBS | HEART RATE: 103 BPM | OXYGEN SATURATION: 96 % | DIASTOLIC BLOOD PRESSURE: 62 MMHG | TEMPERATURE: 97.6 F | SYSTOLIC BLOOD PRESSURE: 124 MMHG | BODY MASS INDEX: 47.2 KG/M2 | HEIGHT: 61 IN

## 2024-03-04 DIAGNOSIS — G43.E09 CHRONIC MIGRAINE WITH AURA WITHOUT STATUS MIGRAINOSUS, NOT INTRACTABLE: Primary | ICD-10-CM

## 2024-03-04 PROCEDURE — 3078F DIAST BP <80 MM HG: CPT | Performed by: PSYCHIATRY & NEUROLOGY

## 2024-03-04 PROCEDURE — 3074F SYST BP LT 130 MM HG: CPT | Performed by: PSYCHIATRY & NEUROLOGY

## 2024-03-04 PROCEDURE — 64615 CHEMODENERV MUSC MIGRAINE: CPT | Performed by: PSYCHIATRY & NEUROLOGY

## 2024-03-04 PROCEDURE — 700101 HCHG RX REV CODE 250: Performed by: PSYCHIATRY & NEUROLOGY

## 2024-03-04 PROCEDURE — 700111 HCHG RX REV CODE 636 W/ 250 OVERRIDE (IP): Mod: JZ,JG | Performed by: PSYCHIATRY & NEUROLOGY

## 2024-03-04 RX ORDER — RIMEGEPANT SULFATE 75 MG/75MG
75 TABLET, ORALLY DISINTEGRATING ORAL PRN
COMMUNITY

## 2024-03-04 RX ADMIN — SODIUM CHLORIDE 155 UNITS: 9 INJECTION, SOLUTION INTRAMUSCULAR; INTRAVENOUS; SUBCUTANEOUS at 07:39

## 2024-03-04 NOTE — PROGRESS NOTES
[facility]  BOTOX PROCEDURE NOTE    Chronic Migraine:  Botox therapy has reduced patient’s migraines by more than 7 days and/or 100 hours per month.     I treated Guilherme Flaherty in clinic today with BotoxA 155 units according to the dosing/injection paradigm currently mandated by the FDA for the management of chronic migraine.  Specifically, I injected:  - 5 units to the procerus,  - 5 units to the corrugators (bilaterally),  - a total of 20 units to the frontalis musculature,  - 20 units to the temporalis (bilaterally),  - 15 units to the occipitalis (bilaterally),  - 10 units to the cervical paraspinals (bilaterally), and  - 15 units to the trapezius musculature (bilaterally).    The remainder of the Botox was discarded as wastage per FDA guidelines  Consent on file.  Patient identify verified with 2 patient identifiers.     Frequency of headaches is >15 days monthly with at least 8 migraines monthly.    Migraines include at least two of the following: worsened with activity or avoidance of activity with migraines (ie they go lie down), moderate to severe pain intensity, pulsing headache, unilateral headache and has  have either nausea or vomiting OR sensitivity to light and sound.     Although Guilherme Flaherty is responding to botox s/he is NOT migraine free.  I recommend that Botox be continued at this time.    Guilherme Flaherty has chronic migraines, defined as having 15 or more headaches days per month, 8 of which are migraines, over a minimum of the last three months.  Episodes last more than 4 hours (untreated).  Pt has 2 or more of following (see initial note):  - headache worsened with activity  - pain is moderate to severe intensity  - pulsing in nature  - unilateral   and patient either has nausea/vomiting OR sensitivity to light and sound.    No adverse effect of Botox noted at conclusion of today's appointment.    Follow up in 12 weeks for Botox or sooner if needed.    Signed: Phil  TONYA Stinson M.D.

## 2024-03-11 PROCEDURE — RXMED WILLOW AMBULATORY MEDICATION CHARGE: Performed by: PSYCHIATRY & NEUROLOGY

## 2024-03-12 ENCOUNTER — PHARMACY VISIT (OUTPATIENT)
Dept: PHARMACY | Facility: MEDICAL CENTER | Age: 39
End: 2024-03-12
Payer: COMMERCIAL

## 2024-03-12 PROCEDURE — RXMED WILLOW AMBULATORY MEDICATION CHARGE: Performed by: PSYCHIATRY & NEUROLOGY

## 2024-04-12 ENCOUNTER — TELEPHONE (OUTPATIENT)
Dept: PHARMACY | Facility: MEDICAL CENTER | Age: 39
End: 2024-04-12
Payer: COMMERCIAL

## 2024-04-12 PROCEDURE — RXMED WILLOW AMBULATORY MEDICATION CHARGE: Performed by: PSYCHIATRY & NEUROLOGY

## 2024-04-12 NOTE — TELEPHONE ENCOUNTER
Contact:      Phone number: [phone], Mobile    Name of person spoken with and relationship to patient: Guilherme Flaherty, Self   Patient’s Adherence:      How patient is doing on medication: Good    How many missed doses and reason: 0, PRN    Any new medications: No    Any new conditions: No    Any new allergies: No    Any new side effects: No    Any new diagnoses: No    How many doses remainin-10 tabs of Nurtec    Did patient want to speak with pharmacist: No  Delivery:      Delivery date and method: 04/15 via     Needs by Date:     Signature required: No    Any additional details for : None   Teach Appointment Date: N/A  Shipping Address: [facility] [place] [address]  Medication (name, strength and dose): Amitriptyline 25mg tabs, Nurtec 75mg tabs  Copay: $5   Payment Method: [facility]  Supplies: None  Additional Information: Next call date: .

## 2024-05-10 PROCEDURE — RXMED WILLOW AMBULATORY MEDICATION CHARGE: Performed by: PSYCHIATRY & NEUROLOGY

## 2024-05-28 ENCOUNTER — OFFICE VISIT (OUTPATIENT)
Dept: NEUROLOGY | Facility: MEDICAL CENTER | Age: 39
End: 2024-05-28
Attending: PSYCHIATRY & NEUROLOGY
Payer: COMMERCIAL

## 2024-05-28 VITALS
OXYGEN SATURATION: 96 % | HEART RATE: 116 BPM | DIASTOLIC BLOOD PRESSURE: 100 MMHG | HEIGHT: 61 IN | RESPIRATION RATE: 20 BRPM | WEIGHT: 260.8 LBS | BODY MASS INDEX: 49.24 KG/M2 | TEMPERATURE: 98.4 F | SYSTOLIC BLOOD PRESSURE: 160 MMHG

## 2024-05-28 DIAGNOSIS — G43.E09 CHRONIC MIGRAINE WITH AURA WITHOUT STATUS MIGRAINOSUS, NOT INTRACTABLE: Primary | ICD-10-CM

## 2024-05-28 PROCEDURE — 64615 CHEMODENERV MUSC MIGRAINE: CPT | Performed by: PSYCHIATRY & NEUROLOGY

## 2024-05-28 PROCEDURE — 3077F SYST BP >= 140 MM HG: CPT | Performed by: PSYCHIATRY & NEUROLOGY

## 2024-05-28 PROCEDURE — 3080F DIAST BP >= 90 MM HG: CPT | Performed by: PSYCHIATRY & NEUROLOGY

## 2024-05-28 RX ADMIN — SODIUM CHLORIDE 155 UNITS: 9 INJECTION INTRAMUSCULAR; INTRAVENOUS; SUBCUTANEOUS at 15:49

## 2024-05-29 ENCOUNTER — PHARMACY VISIT (OUTPATIENT)
Dept: PHARMACY | Facility: MEDICAL CENTER | Age: 39
End: 2024-05-29
Payer: COMMERCIAL

## 2024-06-12 PROCEDURE — RXMED WILLOW AMBULATORY MEDICATION CHARGE: Performed by: PSYCHIATRY & NEUROLOGY

## 2024-06-14 ENCOUNTER — PHARMACY VISIT (OUTPATIENT)
Dept: PHARMACY | Facility: MEDICAL CENTER | Age: 39
End: 2024-06-14
Payer: COMMERCIAL

## 2024-06-25 ENCOUNTER — APPOINTMENT (OUTPATIENT)
Dept: NEUROLOGY | Facility: MEDICAL CENTER | Age: 39
End: 2024-06-25
Attending: PSYCHIATRY & NEUROLOGY
Payer: COMMERCIAL

## 2024-06-27 DIAGNOSIS — G43.109 MIGRAINE WITH AURA AND WITHOUT STATUS MIGRAINOSUS, NOT INTRACTABLE: ICD-10-CM

## 2024-07-01 RX ORDER — RIMEGEPANT SULFATE 75 MG/75MG
75 TABLET, ORALLY DISINTEGRATING ORAL
Qty: 8 TABLET | Refills: 11 | Status: SHIPPED | OUTPATIENT
Start: 2024-07-01 | End: 2025-07-01

## 2024-07-03 ENCOUNTER — TELEPHONE (OUTPATIENT)
Dept: PHARMACY | Facility: MEDICAL CENTER | Age: 39
End: 2024-07-03
Payer: COMMERCIAL

## 2024-07-23 ENCOUNTER — TELEPHONE (OUTPATIENT)
Dept: NEUROLOGY | Facility: MEDICAL CENTER | Age: 39
End: 2024-07-23
Payer: COMMERCIAL

## 2024-07-23 PROCEDURE — RXMED WILLOW AMBULATORY MEDICATION CHARGE: Performed by: PSYCHIATRY & NEUROLOGY

## 2024-07-26 ENCOUNTER — PHARMACY VISIT (OUTPATIENT)
Dept: PHARMACY | Facility: MEDICAL CENTER | Age: 39
End: 2024-07-26
Payer: COMMERCIAL

## 2024-08-20 ENCOUNTER — APPOINTMENT (OUTPATIENT)
Dept: NEUROLOGY | Facility: MEDICAL CENTER | Age: 39
End: 2024-08-20
Attending: PSYCHIATRY & NEUROLOGY
Payer: COMMERCIAL

## 2024-08-23 ENCOUNTER — OFFICE VISIT (OUTPATIENT)
Dept: NEUROLOGY | Facility: MEDICAL CENTER | Age: 39
End: 2024-08-23
Attending: PSYCHIATRY & NEUROLOGY
Payer: COMMERCIAL

## 2024-08-23 VITALS
HEIGHT: 61 IN | TEMPERATURE: 98.3 F | DIASTOLIC BLOOD PRESSURE: 82 MMHG | OXYGEN SATURATION: 94 % | SYSTOLIC BLOOD PRESSURE: 136 MMHG | WEIGHT: 248 LBS | BODY MASS INDEX: 46.82 KG/M2 | RESPIRATION RATE: 17 BRPM | HEART RATE: 105 BPM

## 2024-08-23 DIAGNOSIS — G43.109 MIGRAINE WITH AURA AND WITHOUT STATUS MIGRAINOSUS, NOT INTRACTABLE: ICD-10-CM

## 2024-08-23 DIAGNOSIS — G43.E09 CHRONIC MIGRAINE WITH AURA WITHOUT STATUS MIGRAINOSUS, NOT INTRACTABLE: Primary | ICD-10-CM

## 2024-08-23 PROCEDURE — 3075F SYST BP GE 130 - 139MM HG: CPT | Performed by: PSYCHIATRY & NEUROLOGY

## 2024-08-23 PROCEDURE — 3079F DIAST BP 80-89 MM HG: CPT | Performed by: PSYCHIATRY & NEUROLOGY

## 2024-08-23 PROCEDURE — 700101 HCHG RX REV CODE 250

## 2024-08-23 PROCEDURE — 700111 HCHG RX REV CODE 636 W/ 250 OVERRIDE (IP): Mod: JZ

## 2024-08-23 PROCEDURE — 64615 CHEMODENERV MUSC MIGRAINE: CPT | Performed by: PSYCHIATRY & NEUROLOGY

## 2024-08-23 RX ADMIN — SODIUM CHLORIDE 155 UNITS: 9 INJECTION INTRAMUSCULAR; INTRAVENOUS; SUBCUTANEOUS at 09:27

## 2024-08-27 PROCEDURE — RXMED WILLOW AMBULATORY MEDICATION CHARGE: Performed by: PSYCHIATRY & NEUROLOGY

## 2024-08-28 ENCOUNTER — PHARMACY VISIT (OUTPATIENT)
Dept: PHARMACY | Facility: MEDICAL CENTER | Age: 39
End: 2024-08-28
Payer: COMMERCIAL

## 2024-09-03 ENCOUNTER — APPOINTMENT (OUTPATIENT)
Dept: MEDICAL GROUP | Facility: CLINIC | Age: 39
End: 2024-09-03
Payer: COMMERCIAL

## 2024-09-03 VITALS
SYSTOLIC BLOOD PRESSURE: 138 MMHG | BODY MASS INDEX: 46.98 KG/M2 | DIASTOLIC BLOOD PRESSURE: 100 MMHG | WEIGHT: 255.29 LBS | TEMPERATURE: 98.4 F | HEART RATE: 100 BPM | RESPIRATION RATE: 16 BRPM | HEIGHT: 62 IN | OXYGEN SATURATION: 98 %

## 2024-09-03 DIAGNOSIS — R63.5 WEIGHT GAIN: ICD-10-CM

## 2024-09-03 DIAGNOSIS — Z83.49 FAMILY HISTORY OF THYROID DISORDER: ICD-10-CM

## 2024-09-03 DIAGNOSIS — E04.9 ENLARGED THYROID: ICD-10-CM

## 2024-09-03 DIAGNOSIS — R22.1 NECK SWELLING: ICD-10-CM

## 2024-09-03 DIAGNOSIS — E78.00 ELEVATED LDL CHOLESTEROL LEVEL: ICD-10-CM

## 2024-09-03 DIAGNOSIS — R03.0 ELEVATED BLOOD PRESSURE READING: ICD-10-CM

## 2024-09-03 DIAGNOSIS — Z13.21 ENCOUNTER FOR VITAMIN DEFICIENCY SCREENING: ICD-10-CM

## 2024-09-03 PROBLEM — D64.9 ANEMIA: Status: ACTIVE | Noted: 2024-02-24

## 2024-09-03 PROBLEM — K64.9 HEMORRHOIDS: Status: ACTIVE | Noted: 2024-02-24

## 2024-09-03 PROCEDURE — 99214 OFFICE O/P EST MOD 30 MIN: CPT | Performed by: PHYSICIAN ASSISTANT

## 2024-09-03 PROCEDURE — 3075F SYST BP GE 130 - 139MM HG: CPT | Performed by: PHYSICIAN ASSISTANT

## 2024-09-03 PROCEDURE — 3080F DIAST BP >= 90 MM HG: CPT | Performed by: PHYSICIAN ASSISTANT

## 2024-09-03 RX ORDER — BUDESONIDE 0.5 MG/2ML
INHALANT ORAL
COMMUNITY

## 2024-09-03 RX ORDER — VALACYCLOVIR HYDROCHLORIDE 500 MG/1
1 TABLET, FILM COATED ORAL DAILY
COMMUNITY
End: 2024-09-03

## 2024-09-03 RX ORDER — MONTELUKAST SODIUM 10 MG/1
1 TABLET ORAL
COMMUNITY
End: 2024-09-03

## 2024-09-03 RX ORDER — PREDNISONE 10 MG/1
TABLET ORAL
COMMUNITY
Start: 2024-07-16 | End: 2024-09-03

## 2024-09-03 NOTE — PROGRESS NOTES
cc:  weight    Subjective:     Guilherme Flaherty is a 39 y.o. female presenting for weight        History of Present Illness  The patient is a 39-year-old female who presents to [facility] care. She has concerns about weight and is requesting routine lab work.    She has experienced weight gain throughout her life, which has become more pronounced in recent months. She was advised to lose weight and increase physical activity but finds this challenging due to her health condition. She has gained weight in the last two weeks and has been working long hours, leading to increased stress.    She is under the care of a neurologist for migraines and is currently undergoing Botox treatment. She has just completed her third round of Botox this month, which she finds helpful, although she experiences bruising on her shoulders and tenderness on her forehead following the injections. She also takes amitriptyline 25 mg daily, which she reports as being minimally effective. She has been on this medication for a significant period and has noticed dry mouth as a side effect. She has tried Aimovig and Ajovy injections but experienced adverse reactions to both. She is considering discontinuing amitriptyline due to its side effects.    She has a history of COVID-19 infection, which required hospitalization and supplemental oxygen. She has not sought medical attention since, except from her specialists.    She has experienced symptoms suggestive of a sore throat, including tenderness, soreness, and swelling, a few months ago. These symptoms did not progress into an illness, but she notes persistent swelling and tenderness. She reports no swelling of the lymph nodes in her neck.    She uses inhalers for respiratory issues and underwent a course of steroids a few months ago. She uses her rescue inhaler and Breo approximately five times a week and rinses her sinuses with budesonide. She is under the care of an ENT specialist and  "reports that her recovery from surgery four years ago is progressing well.    Her blood pressure is typically around 120/80, but she has noticed fluctuations over the past year.    She has a history of high cholesterol and is due for a mammogram.    FAMILY HISTORY  She has a family history of thyroid issues in her mother, grandmother, great-grandmother, and sister.       Review of systems:  See above.   Denies any symptoms unless previously indicated.        Current Outpatient Medications:     budesonide (PULMICORT) 0.5 MG/2ML Suspension, , Disp: , Rfl:     Rimegepant Sulfate (NURTEC) 75 MG TABLET DISPERSIBLE, Take 1 Tablet by mouth 1 time a day as needed (migraine)., Disp: 8 Tablet, Rfl: 11    amitriptyline (ELAVIL) 25 MG Tab, Take 1 Tablet by mouth every evening for 360 days., Disp: 90 Tablet, Rfl: 3    valACYclovir (VALTREX) 500 MG Tab, Take 1 Tablet by mouth every day., Disp: 40 Tablet, Rfl: 0    fexofenadine (ALLEGRA) 180 MG tablet, Take 180 mg by mouth every day., Disp: , Rfl:     Fluticasone Furoate-Vilanterol (BREO) 100-25 MCG/INH AEROSOL POWDER, BREATH ACTIVATED, Inhale 1 Puff every day., Disp: , Rfl:     aspirin (ASA) 325 MG Tab, Take 325 mg by mouth 2 times a day., Disp: , Rfl:     montelukast (SINGULAIR) 10 MG Tab, Take 10 mg by mouth every day., Disp: , Rfl:     albuterol 108 (90 Base) MCG/ACT Aero Soln inhalation aerosol, Inhale 2 Puffs every 6 hours as needed for Shortness of Breath., Disp: , Rfl:     Rimegepant Sulfate (NURTEC) 75 MG TABLET DISPERSIBLE, Take 75 mg by mouth as needed. (Patient not taking: Reported on 9/3/2024), Disp: , Rfl:     Allergies, past medical history, past surgical history, family history, social history reviewed and updated    Objective:     Vitals: BP (!) 138/100 (BP Location: Right arm, Patient Position: Sitting, BP Cuff Size: Adult long)   Pulse 100   Temp 36.9 °C (98.4 °F) (Temporal)   Resp 16   Ht 1.562 m (5' 1.5\")   Wt 116 kg (255 lb 4.7 oz)   SpO2 98%   BMI " 47.46 kg/m²   General: Alert, pleasant, NAD  EYES:   PERRL, EOMI, no icterus or pallor.  Conjunctivae and lids normal.   HENT:  Normocephalic.  External ears normal. Neck supple.  Left sided thyroid enlargement.  Respiratory: Normal respiratory effort.    Abdomen: obese  Skin: Warm, dry, no rashes.  Musculoskeletal: Gait is normal.  Moves all extremities well.    Extremities: normal range of motion all extremities.   Neurological: No tremors, sensation grossly intact, CN2-12 intact.  Psych:  Affect/mood is normal, judgement is good, memory is intact, grooming is appropriate.    Physical Exam  Vital Signs  Blood pressure reading is 138/100. Weight is 255.         Assessment/Plan:     Guilherme was seen today for establish care and requesting labs.    Diagnoses and all orders for this visit:    Weight gain    Neck swelling  -     TSH WITH REFLEX TO FT4; Future  -     US-THYROID; Future    Enlarged thyroid  -     TSH WITH REFLEX TO FT4; Future  -     US-THYROID; Future    Elevated LDL cholesterol level  -     Comp Metabolic Panel; Future  -     Lipid Profile; Future    Family history of thyroid disorder  -     TSH WITH REFLEX TO FT4; Future  -     TRIIDOTHYRONINE; Future  -     THYROID PEROXIDASE  (TPO) AB; Future  -     ANTITHYROGLOBULIN AB; Future  -     US-THYROID; Future    Encounter for vitamin deficiency screening  -     VITAMIN D,25 HYDROXY (DEFICIENCY); Future    Elevated blood pressure reading        Assessment & Plan  1. Weight gain.  She reports persistent weight gain despite various efforts. Amitriptyline, which she takes for migraines, is known to cause weight gain. She is advised to discuss with her neurologist about potentially weaning off amitriptyline, especially since she is also receiving Botox treatments for migraines.    2. Neck swelling and enlarged thyroid.  She reports intermittent swelling and tenderness in the neck over the past few months. Given her family history of thyroid issues, lab work  has been ordered to evaluate thyroid function, including thyroid antibodies. An ultrasound of the neck has also been ordered to assess for thyroid enlargement or other abnormalities.    3. Family history of thyroid disorder.  She has a significant family history of thyroid disorders, including her mother, aunts, grandmother, great-grandmother, and sister. Thyroid function tests and thyroid antibodies have been ordered to rule out any thyroid dysfunction.    4. Elevated LDL.  Her LDL cholesterol was previously noted to be elevated. Lab work has been ordered to re-evaluate her cholesterol levels. She is advised to continue monitoring her diet and consider lifestyle modifications to manage cholesterol levels.    5. Vitamin deficiency screen.  Given her concerns and the time since her last evaluation, lab work has been ordered to check vitamin D levels, liver and kidney function, and blood sugar levels.    6. Elevated blood pressure.  Her blood pressure today is 138/100, which she attributes to anxiety and recent stress, including a large espresso before the visit. She is advised to avoid espresso before her next visit. Blood pressure will be re-evaluated at the follow-up appointment before considering any medication.    Follow-up  The patient will follow up in 4 to 6 weeks with test results.    Return in about 6 weeks (around 10/15/2024), or if symptoms worsen or fail to improve, for 4-6 weeks labs.  .    Please note that this dictation was created using voice recognition software. I have made every reasonable attempt to correct obvious errors, but expect that there are errors of grammar and possible content that I did not discover before finalizing note.

## 2024-09-19 ENCOUNTER — HOSPITAL ENCOUNTER (OUTPATIENT)
Dept: RADIOLOGY | Facility: MEDICAL CENTER | Age: 39
End: 2024-09-19
Attending: PHYSICIAN ASSISTANT
Payer: COMMERCIAL

## 2024-09-19 DIAGNOSIS — Z83.49 FAMILY HISTORY OF THYROID DISORDER: ICD-10-CM

## 2024-09-19 DIAGNOSIS — R22.1 NECK SWELLING: ICD-10-CM

## 2024-09-19 DIAGNOSIS — E04.9 ENLARGED THYROID: ICD-10-CM

## 2024-09-19 PROCEDURE — 76536 US EXAM OF HEAD AND NECK: CPT

## 2024-09-24 PROCEDURE — RXMED WILLOW AMBULATORY MEDICATION CHARGE: Performed by: PSYCHIATRY & NEUROLOGY

## 2024-09-25 ENCOUNTER — PHARMACY VISIT (OUTPATIENT)
Dept: PHARMACY | Facility: MEDICAL CENTER | Age: 39
End: 2024-09-25
Payer: COMMERCIAL

## 2024-10-16 LAB
25(OH)D3+25(OH)D2 SERPL-MCNC: 30.7 NG/ML (ref 30–100)
ALBUMIN SERPL-MCNC: 4.1 G/DL (ref 3.9–4.9)
ALP SERPL-CCNC: 63 IU/L (ref 44–121)
ALT SERPL-CCNC: 29 IU/L (ref 0–32)
AST SERPL-CCNC: 22 IU/L (ref 0–40)
BILIRUB SERPL-MCNC: 0.5 MG/DL (ref 0–1.2)
BUN SERPL-MCNC: 11 MG/DL (ref 6–24)
BUN/CREAT SERPL: 15 (ref 9–23)
CALCIUM SERPL-MCNC: 9 MG/DL (ref 8.7–10.2)
CHLORIDE SERPL-SCNC: 103 MMOL/L (ref 96–106)
CHOLEST SERPL-MCNC: 177 MG/DL (ref 100–199)
CO2 SERPL-SCNC: 22 MMOL/L (ref 20–29)
CREAT SERPL-MCNC: 0.72 MG/DL (ref 0.57–1)
EGFRCR SERPLBLD CKD-EPI 2021: 108 ML/MIN/1.73
GLOBULIN SER CALC-MCNC: 2.6 G/DL (ref 1.5–4.5)
GLUCOSE SERPL-MCNC: 110 MG/DL (ref 70–99)
HDLC SERPL-MCNC: 45 MG/DL
LDL CALC COMMENT:: ABNORMAL
LDLC SERPL CALC-MCNC: 118 MG/DL (ref 0–99)
POTASSIUM SERPL-SCNC: 4.6 MMOL/L (ref 3.5–5.2)
PROT SERPL-MCNC: 6.7 G/DL (ref 6–8.5)
SODIUM SERPL-SCNC: 138 MMOL/L (ref 134–144)
T3 SERPL-MCNC: 124 NG/DL (ref 71–180)
THYROGLOB AB SERPL-ACNC: <1 IU/ML (ref 0–0.9)
THYROPEROXIDASE AB SERPL-ACNC: <9 IU/ML (ref 0–34)
TRIGL SERPL-MCNC: 76 MG/DL (ref 0–149)
TSH SERPL DL<=0.005 MIU/L-ACNC: 2.04 UIU/ML (ref 0.45–4.5)
VLDLC SERPL CALC-MCNC: 14 MG/DL (ref 5–40)

## 2024-10-24 PROCEDURE — RXMED WILLOW AMBULATORY MEDICATION CHARGE: Performed by: PSYCHIATRY & NEUROLOGY

## 2024-11-11 ENCOUNTER — APPOINTMENT (OUTPATIENT)
Dept: MEDICAL GROUP | Facility: CLINIC | Age: 39
End: 2024-11-11
Payer: COMMERCIAL

## 2024-11-11 VITALS
DIASTOLIC BLOOD PRESSURE: 96 MMHG | SYSTOLIC BLOOD PRESSURE: 132 MMHG | WEIGHT: 249.12 LBS | RESPIRATION RATE: 16 BRPM | OXYGEN SATURATION: 96 % | HEART RATE: 94 BPM | TEMPERATURE: 97.6 F | HEIGHT: 61 IN | BODY MASS INDEX: 47.03 KG/M2

## 2024-11-11 DIAGNOSIS — E78.00 ELEVATED LDL CHOLESTEROL LEVEL: ICD-10-CM

## 2024-11-11 DIAGNOSIS — R59.9 SWOLLEN LYMPH NODES: ICD-10-CM

## 2024-11-11 DIAGNOSIS — M25.50 MULTIPLE JOINT PAIN: ICD-10-CM

## 2024-11-11 DIAGNOSIS — R73.9 HYPERGLYCEMIA: ICD-10-CM

## 2024-11-11 DIAGNOSIS — J45.909 MODERATE ASTHMA WITHOUT COMPLICATION, UNSPECIFIED WHETHER PERSISTENT: ICD-10-CM

## 2024-11-11 PROCEDURE — 3075F SYST BP GE 130 - 139MM HG: CPT | Performed by: PHYSICIAN ASSISTANT

## 2024-11-11 PROCEDURE — 99214 OFFICE O/P EST MOD 30 MIN: CPT | Performed by: PHYSICIAN ASSISTANT

## 2024-11-11 PROCEDURE — 3080F DIAST BP >= 90 MM HG: CPT | Performed by: PHYSICIAN ASSISTANT

## 2024-11-11 RX ORDER — FLUCONAZOLE 150 MG/1
TABLET ORAL
Qty: 2 TABLET | Refills: 1 | Status: SHIPPED | OUTPATIENT
Start: 2024-11-11

## 2024-11-11 RX ORDER — AMOXICILLIN 500 MG/1
500 CAPSULE ORAL 3 TIMES DAILY
Qty: 30 CAPSULE | Refills: 0 | Status: SHIPPED | OUTPATIENT
Start: 2024-11-11

## 2024-11-11 RX ORDER — METHYLPREDNISOLONE 4 MG/1
TABLET ORAL
Qty: 21 TABLET | Refills: 0 | Status: SHIPPED | OUTPATIENT
Start: 2024-11-11

## 2024-11-11 NOTE — PROGRESS NOTES
cc:  weight gain    Subjective:     Guilherme Flaherty is a 39 y.o. female presenting for weight gain        History of Present Illness  The patient is a 39-year-old female who presents to the office today to follow up on her most recent test results, which show an elevated glucose level of 110 and an LDL level of 118. Otherwise, her labs are normal, including thyroid function.    She reports experiencing swelling and tenderness in her underarms last week, which extended down her neck and into her arms. This discomfort has been intermittent over the past few months. Additionally, she mentions joint pain in her elbows, knees, and ankles, which she attributes to her weight. She has a history of ACL surgery on her left leg two years ago and continues to experience swelling in that leg. She has been using compression stockings to manage the swelling, which is more pronounced in her left leg. She also reports a heavy feeling in her chest, which she attempts to alleviate by coughing.    She has a history of Samter's triad, which includes polyps and sinus issues. She has undergone sinus surgery and has found that rinsing with steroids improves her condition. However, she has been using her rescue inhaler more frequently in recent days. She has run out of her Breo medication and has not refilled it for several weeks. She has been using a nasal spray as well.    FAMILY HISTORY  Her father had stomach cancer. Her paternal grandparents passed away from cancer. Her maternal grandmother passed away from stomach cancer. There is a family history of high blood pressure on her father's side. There is a family history of diverticulitis. There is a family history of blood clotting and migraines on her mother's side.       Review of systems:  See above.   Denies any symptoms unless previously indicated.        Current Outpatient Medications:     budesonide (PULMICORT) 0.5 MG/2ML Suspension, , Disp: , Rfl:     Rimegepant Sulfate  "(NURTEC) 75 MG TABLET DISPERSIBLE, Take 1 Tablet by mouth 1 time a day as needed (migraine)., Disp: 8 Tablet, Rfl: 11    amitriptyline (ELAVIL) 25 MG Tab, Take 1 Tablet by mouth every evening for 360 days., Disp: 90 Tablet, Rfl: 3    valACYclovir (VALTREX) 500 MG Tab, Take 1 Tablet by mouth every day., Disp: 40 Tablet, Rfl: 0    fexofenadine (ALLEGRA) 180 MG tablet, Take 180 mg by mouth every day., Disp: , Rfl:     Fluticasone Furoate-Vilanterol (BREO) 100-25 MCG/INH AEROSOL POWDER, BREATH ACTIVATED, Inhale 1 Puff every day., Disp: , Rfl:     aspirin (ASA) 325 MG Tab, Take 325 mg by mouth 2 times a day., Disp: , Rfl:     montelukast (SINGULAIR) 10 MG Tab, Take 10 mg by mouth every day., Disp: , Rfl:     albuterol 108 (90 Base) MCG/ACT Aero Soln inhalation aerosol, Inhale 2 Puffs every 6 hours as needed for Shortness of Breath., Disp: , Rfl:     Allergies, past medical history, past surgical history, family history, social history reviewed and updated    Objective:     Vitals: BP (!) 132/96 (BP Location: Left arm, Patient Position: Sitting, BP Cuff Size: Large adult)   Pulse 94   Temp 36.4 °C (97.6 °F) (Temporal)   Resp 16   Ht 1.549 m (5' 1\")   Wt 113 kg (249 lb 1.9 oz)   SpO2 96%   BMI 47.07 kg/m²   General: Alert, pleasant, NAD  EYES:   PERRL, EOMI, no icterus or pallor.  Conjunctivae and lids normal.   HENT:  Normocephalic.  External ears normal.  Neck supple.     Respiratory: Normal respiratory effort.   Abdomen: obese  Skin: Warm, dry, no rashes.  Musculoskeletal: Gait is normal.  Moves all extremities well.    Extremities: normal range of motion all extremities.   Neurological: No tremors, sensation grossly intact, CN2-12 intact.  Psych:  Affect/mood is normal, judgement is good, memory is intact, grooming is appropriate.      Results  Laboratory Studies  Glucose level is 110. LDL level is 118.      Latest Reference Range & Units 10/12/24 05:01   Sodium 134 - 144 mmol/L 138   Potassium 3.5 - 5.2 mmol/L " 4.6   Chloride 96 - 106 mmol/L 103   Co2 20 - 29 mmol/L 22   Glucose 70 - 99 mg/dL 110 (H)   Bun 6 - 24 mg/dL 11   Creatinine 0.57 - 1.00 mg/dL 0.72   Bun-Creatinine Ratio 9 - 23  15   Calcium 8.7 - 10.2 mg/dL 9.0   AST(SGOT) 0 - 40 IU/L 22   ALT(SGPT) 0 - 32 IU/L 29   Alkaline Phosphatase 44 - 121 IU/L 63   Total Bilirubin 0.0 - 1.2 mg/dL 0.5   Albumin 3.9 - 4.9 g/dL 4.1   Total Protein 6.0 - 8.5 g/dL 6.7   Globulin 1.5 - 4.5 g/dL 2.6   Cholesterol,Tot 100 - 199 mg/dL 177   Triglycerides 0 - 149 mg/dL 76   HDL >39 mg/dL 45   LDL Chol Calc (NIH) 0 - 99 mg/dL 118 (H)   VLDL Cholesterol Calc 5 - 40 mg/dL 14   LDL Calc Comment:  CANCELED   25-Hydroxy   Vitamin D 25 30.0 - 100.0 ng/mL 30.7   Microsomal -Tpo- Abs 0 - 34 IU/mL <9   eGFR >59 mL/min/1.73 108   Thyroglob Ab 0.0 - 0.9 IU/mL <1.0   T3 71 - 180 ng/dL 124   TSH 0.450 - 4.500 uIU/mL 2.040   (H): Data is abnormally high    Assessment/Plan:     There are no diagnoses linked to this encounter.    Assessment & Plan  1. Swollen lymph nodes.  The ultrasound of the neck revealed reactive lymph nodes. A chest x-ray has been ordered to further investigate the cause. A course of amoxicillin 500 mg three times a day for 10 days will be initiated, along with Diflucan to prevent any potential yeast infections. Autoimmune testing, including a complete blood count and urine test, will be conducted to evaluate for conditions such as rheumatoid arthritis and lupus. If there is no improvement, a Medrol Dosepak will be added to the regimen.    2. Multiple joint pain.  Autoimmune testing will be conducted to evaluate further. This includes a large blood panel and urine test to check for conditions like rheumatoid arthritis and lupus.    3. Moderate asthma.  The condition is currently uncontrolled. She will collect her Breo inhaler, which she has been without for several weeks. If there is no improvement with the Breo, a Medrol Dosepak will be added to her regimen.    4.  Hyperglycemia.  The glucose level is slightly elevated at 110. Labs will be repeated in approximately 6 months to monitor the condition.    5. Elevated LDL.  The LDL level is slightly elevated at 118. Labs will be repeated in approximately 6 months to monitor the condition.    Follow-up  Return in 8 to 12 weeks for follow up, or sooner if necessary.    No follow-ups on file.    Please note that this dictation was created using voice recognition software. I have made every reasonable attempt to correct obvious errors, but expect that there are errors of grammar and possible content that I did not discover before finalizing note.

## 2024-11-20 ENCOUNTER — PHARMACY VISIT (OUTPATIENT)
Dept: PHARMACY | Facility: MEDICAL CENTER | Age: 39
End: 2024-11-20
Payer: COMMERCIAL

## 2024-11-20 PROCEDURE — RXMED WILLOW AMBULATORY MEDICATION CHARGE: Performed by: PSYCHIATRY & NEUROLOGY

## 2024-11-21 ENCOUNTER — PHARMACY VISIT (OUTPATIENT)
Dept: PHARMACY | Facility: MEDICAL CENTER | Age: 39
End: 2024-11-21
Payer: COMMERCIAL

## 2024-11-26 ENCOUNTER — OFFICE VISIT (OUTPATIENT)
Dept: NEUROLOGY | Facility: MEDICAL CENTER | Age: 39
End: 2024-11-26
Attending: PSYCHIATRY & NEUROLOGY
Payer: COMMERCIAL

## 2024-11-26 VITALS
RESPIRATION RATE: 16 BRPM | HEIGHT: 61 IN | WEIGHT: 255.73 LBS | SYSTOLIC BLOOD PRESSURE: 148 MMHG | BODY MASS INDEX: 48.28 KG/M2 | OXYGEN SATURATION: 95 % | HEART RATE: 121 BPM | TEMPERATURE: 99.1 F | DIASTOLIC BLOOD PRESSURE: 100 MMHG

## 2024-11-26 DIAGNOSIS — G43.E09 CHRONIC MIGRAINE WITH AURA WITHOUT STATUS MIGRAINOSUS, NOT INTRACTABLE: Primary | ICD-10-CM

## 2024-11-26 PROCEDURE — 700101 HCHG RX REV CODE 250

## 2024-11-26 PROCEDURE — 700111 HCHG RX REV CODE 636 W/ 250 OVERRIDE (IP): Mod: JZ

## 2024-11-26 RX ADMIN — SODIUM CHLORIDE 155 UNITS: 9 INJECTION, SOLUTION INTRAMUSCULAR; INTRAVENOUS; SUBCUTANEOUS at 14:27

## 2024-11-26 ASSESSMENT — PATIENT HEALTH QUESTIONNAIRE - PHQ9: CLINICAL INTERPRETATION OF PHQ2 SCORE: 0

## 2024-11-30 ENCOUNTER — APPOINTMENT (OUTPATIENT)
Dept: RADIOLOGY | Facility: MEDICAL CENTER | Age: 39
End: 2024-11-30
Attending: PHYSICIAN ASSISTANT
Payer: COMMERCIAL

## 2024-12-02 ENCOUNTER — APPOINTMENT (OUTPATIENT)
Dept: RADIOLOGY | Facility: MEDICAL CENTER | Age: 39
End: 2024-12-02
Attending: STUDENT IN AN ORGANIZED HEALTH CARE EDUCATION/TRAINING PROGRAM
Payer: COMMERCIAL

## 2024-12-02 ENCOUNTER — HOSPITAL ENCOUNTER (EMERGENCY)
Facility: MEDICAL CENTER | Age: 39
End: 2024-12-02
Attending: STUDENT IN AN ORGANIZED HEALTH CARE EDUCATION/TRAINING PROGRAM
Payer: COMMERCIAL

## 2024-12-02 ENCOUNTER — OFFICE VISIT (OUTPATIENT)
Dept: URGENT CARE | Facility: CLINIC | Age: 39
End: 2024-12-02
Payer: COMMERCIAL

## 2024-12-02 VITALS
SYSTOLIC BLOOD PRESSURE: 134 MMHG | BODY MASS INDEX: 47.69 KG/M2 | HEIGHT: 61 IN | RESPIRATION RATE: 18 BRPM | DIASTOLIC BLOOD PRESSURE: 88 MMHG | OXYGEN SATURATION: 95 % | WEIGHT: 252.6 LBS | TEMPERATURE: 97.8 F | HEART RATE: 122 BPM

## 2024-12-02 VITALS
BODY MASS INDEX: 47.62 KG/M2 | WEIGHT: 252.21 LBS | OXYGEN SATURATION: 96 % | RESPIRATION RATE: 17 BRPM | DIASTOLIC BLOOD PRESSURE: 97 MMHG | HEIGHT: 61 IN | HEART RATE: 97 BPM | SYSTOLIC BLOOD PRESSURE: 209 MMHG | TEMPERATURE: 96.7 F

## 2024-12-02 DIAGNOSIS — R19.7 DIARRHEA, UNSPECIFIED TYPE: ICD-10-CM

## 2024-12-02 DIAGNOSIS — Z79.1 NSAID LONG-TERM USE: ICD-10-CM

## 2024-12-02 DIAGNOSIS — K92.1 BLACK STOOL: ICD-10-CM

## 2024-12-02 DIAGNOSIS — R10.84 GENERALIZED ABDOMINAL PAIN: ICD-10-CM

## 2024-12-02 DIAGNOSIS — I88.0 MESENTERIC ADENITIS: ICD-10-CM

## 2024-12-02 DIAGNOSIS — R11.0 NAUSEA: ICD-10-CM

## 2024-12-02 LAB
ABO GROUP BLD: NORMAL
ALBUMIN SERPL BCP-MCNC: 4.2 G/DL (ref 3.2–4.9)
ALBUMIN/GLOB SERPL: 1.3 G/DL
ALP SERPL-CCNC: 68 U/L (ref 30–99)
ALT SERPL-CCNC: 42 U/L (ref 2–50)
ANION GAP SERPL CALC-SCNC: 14 MMOL/L (ref 7–16)
APPEARANCE UR: NORMAL
APTT PPP: 29.2 SEC (ref 24.7–36)
AST SERPL-CCNC: 43 U/L (ref 12–45)
BASOPHILS # BLD AUTO: 1 % (ref 0–1.8)
BASOPHILS # BLD: 0.05 K/UL (ref 0–0.12)
BILIRUB SERPL-MCNC: 0.4 MG/DL (ref 0.1–1.5)
BILIRUB UR STRIP-MCNC: NORMAL MG/DL
BLD GP AB SCN SERPL QL: NORMAL
BUN SERPL-MCNC: 10 MG/DL (ref 8–22)
CALCIUM ALBUM COR SERPL-MCNC: 8.4 MG/DL (ref 8.5–10.5)
CALCIUM SERPL-MCNC: 8.6 MG/DL (ref 8.5–10.5)
CHLORIDE SERPL-SCNC: 107 MMOL/L (ref 96–112)
CO2 SERPL-SCNC: 21 MMOL/L (ref 20–33)
COLOR UR AUTO: YELLOW
CREAT SERPL-MCNC: 0.7 MG/DL (ref 0.5–1.4)
EOSINOPHIL # BLD AUTO: 0.01 K/UL (ref 0–0.51)
EOSINOPHIL NFR BLD: 0.2 % (ref 0–6.9)
ERYTHROCYTE [DISTWIDTH] IN BLOOD BY AUTOMATED COUNT: 45.4 FL (ref 35.9–50)
GFR SERPLBLD CREATININE-BSD FMLA CKD-EPI: 112 ML/MIN/1.73 M 2
GLOBULIN SER CALC-MCNC: 3.2 G/DL (ref 1.9–3.5)
GLUCOSE SERPL-MCNC: 92 MG/DL (ref 65–99)
GLUCOSE UR STRIP.AUTO-MCNC: NEGATIVE MG/DL
HCG SERPL QL: NEGATIVE
HCT VFR BLD AUTO: 45.8 % (ref 37–47)
HGB BLD-MCNC: 15.7 G/DL (ref 12–16)
IMM GRANULOCYTES # BLD AUTO: 0.02 K/UL (ref 0–0.11)
IMM GRANULOCYTES NFR BLD AUTO: 0.4 % (ref 0–0.9)
INR PPP: 1.03 (ref 0.87–1.13)
KETONES UR STRIP.AUTO-MCNC: 15 MG/DL
LEUKOCYTE ESTERASE UR QL STRIP.AUTO: NEGATIVE
LIPASE SERPL-CCNC: 14 U/L (ref 11–82)
LYMPHOCYTES # BLD AUTO: 0.79 K/UL (ref 1–4.8)
LYMPHOCYTES NFR BLD: 16.3 % (ref 22–41)
MCH RBC QN AUTO: 30.9 PG (ref 27–33)
MCHC RBC AUTO-ENTMCNC: 34.3 G/DL (ref 32.2–35.5)
MCV RBC AUTO: 90.2 FL (ref 81.4–97.8)
MONOCYTES # BLD AUTO: 0.36 K/UL (ref 0–0.85)
MONOCYTES NFR BLD AUTO: 7.4 % (ref 0–13.4)
NEUTROPHILS # BLD AUTO: 3.63 K/UL (ref 1.82–7.42)
NEUTROPHILS NFR BLD: 74.7 % (ref 44–72)
NITRITE UR QL STRIP.AUTO: NEGATIVE
NRBC # BLD AUTO: 0 K/UL
NRBC BLD-RTO: 0 /100 WBC (ref 0–0.2)
PH UR STRIP.AUTO: 5 [PH] (ref 5–8)
PLATELET # BLD AUTO: 233 K/UL (ref 164–446)
PMV BLD AUTO: 9.5 FL (ref 9–12.9)
POCT INT CON NEG: NEGATIVE
POCT INT CON POS: POSITIVE
POCT URINE PREGNANCY TEST: NEGATIVE
POTASSIUM SERPL-SCNC: 4 MMOL/L (ref 3.6–5.5)
PROT SERPL-MCNC: 7.4 G/DL (ref 6–8.2)
PROT UR QL STRIP: NEGATIVE MG/DL
PROTHROMBIN TIME: 13.5 SEC (ref 12–14.6)
RBC # BLD AUTO: 5.08 M/UL (ref 4.2–5.4)
RBC UR QL AUTO: NEGATIVE
RH BLD: NORMAL
SODIUM SERPL-SCNC: 142 MMOL/L (ref 135–145)
SP GR UR STRIP.AUTO: >=1.03
UROBILINOGEN UR STRIP-MCNC: 0.2 MG/DL
WBC # BLD AUTO: 4.9 K/UL (ref 4.8–10.8)

## 2024-12-02 PROCEDURE — 86901 BLOOD TYPING SEROLOGIC RH(D): CPT

## 2024-12-02 PROCEDURE — 700117 HCHG RX CONTRAST REV CODE 255: Performed by: STUDENT IN AN ORGANIZED HEALTH CARE EDUCATION/TRAINING PROGRAM

## 2024-12-02 PROCEDURE — 96375 TX/PRO/DX INJ NEW DRUG ADDON: CPT

## 2024-12-02 PROCEDURE — 86900 BLOOD TYPING SEROLOGIC ABO: CPT

## 2024-12-02 PROCEDURE — 85610 PROTHROMBIN TIME: CPT

## 2024-12-02 PROCEDURE — 3075F SYST BP GE 130 - 139MM HG: CPT

## 2024-12-02 PROCEDURE — 36415 COLL VENOUS BLD VENIPUNCTURE: CPT

## 2024-12-02 PROCEDURE — 99215 OFFICE O/P EST HI 40 MIN: CPT

## 2024-12-02 PROCEDURE — 700111 HCHG RX REV CODE 636 W/ 250 OVERRIDE (IP): Performed by: STUDENT IN AN ORGANIZED HEALTH CARE EDUCATION/TRAINING PROGRAM

## 2024-12-02 PROCEDURE — 3079F DIAST BP 80-89 MM HG: CPT

## 2024-12-02 PROCEDURE — 74177 CT ABD & PELVIS W/CONTRAST: CPT

## 2024-12-02 PROCEDURE — 85025 COMPLETE CBC W/AUTO DIFF WBC: CPT

## 2024-12-02 PROCEDURE — 84703 CHORIONIC GONADOTROPIN ASSAY: CPT

## 2024-12-02 PROCEDURE — 81025 URINE PREGNANCY TEST: CPT | Mod: QW

## 2024-12-02 PROCEDURE — 86850 RBC ANTIBODY SCREEN: CPT

## 2024-12-02 PROCEDURE — 99285 EMERGENCY DEPT VISIT HI MDM: CPT

## 2024-12-02 PROCEDURE — 83690 ASSAY OF LIPASE: CPT

## 2024-12-02 PROCEDURE — 700105 HCHG RX REV CODE 258: Performed by: STUDENT IN AN ORGANIZED HEALTH CARE EDUCATION/TRAINING PROGRAM

## 2024-12-02 PROCEDURE — 80053 COMPREHEN METABOLIC PANEL: CPT

## 2024-12-02 PROCEDURE — 96374 THER/PROPH/DIAG INJ IV PUSH: CPT

## 2024-12-02 PROCEDURE — 81002 URINALYSIS NONAUTO W/O SCOPE: CPT | Mod: QW

## 2024-12-02 PROCEDURE — 85730 THROMBOPLASTIN TIME PARTIAL: CPT

## 2024-12-02 RX ORDER — ONDANSETRON 2 MG/ML
4 INJECTION INTRAMUSCULAR; INTRAVENOUS ONCE
Status: COMPLETED | OUTPATIENT
Start: 2024-12-02 | End: 2024-12-02

## 2024-12-02 RX ORDER — MORPHINE SULFATE 4 MG/ML
6 INJECTION INTRAVENOUS ONCE
Status: COMPLETED | OUTPATIENT
Start: 2024-12-02 | End: 2024-12-02

## 2024-12-02 RX ORDER — ONDANSETRON 4 MG/1
4 TABLET, ORALLY DISINTEGRATING ORAL EVERY 6 HOURS PRN
Qty: 20 TABLET | Refills: 0 | Status: SHIPPED | OUTPATIENT
Start: 2024-12-02

## 2024-12-02 RX ORDER — SODIUM CHLORIDE 9 MG/ML
1000 INJECTION, SOLUTION INTRAVENOUS ONCE
Status: COMPLETED | OUTPATIENT
Start: 2024-12-02 | End: 2024-12-02

## 2024-12-02 RX ADMIN — MORPHINE SULFATE 6 MG: 4 INJECTION INTRAVENOUS at 20:58

## 2024-12-02 RX ADMIN — SODIUM CHLORIDE 1000 ML: 9 INJECTION, SOLUTION INTRAVENOUS at 20:58

## 2024-12-02 RX ADMIN — ONDANSETRON 4 MG: 2 INJECTION INTRAMUSCULAR; INTRAVENOUS at 20:58

## 2024-12-02 RX ADMIN — IOHEXOL 100 ML: 350 INJECTION, SOLUTION INTRAVENOUS at 21:10

## 2024-12-02 ASSESSMENT — ENCOUNTER SYMPTOMS
NAUSEA: 1
FEVER: 0
ABDOMINAL PAIN: 1
DIZZINESS: 0
VOMITING: 0
DIARRHEA: 1
SHORTNESS OF BREATH: 0

## 2024-12-03 NOTE — PROGRESS NOTES
Subjective:     CHIEF COMPLAINT  Chief Complaint   Patient presents with    Abdominal Pain     X1day, abdominal pain and cramps, diarrhea, black stools, nausea, unable to keep anything down       HPI  Guilherme Flaherty is a very pleasant 39 y.o. female who presents with generalized abdominal pain that started suddenly yesterday afternoon.  She was up all night with abdominal pain.  She reports that eating or drinking substantially worsens her pain.  She has had nausea without vomiting.  She developed diarrhea today and reports that her stool has been black and sticky.  She did take a single dose of Pepto bismol yesterday.  She has not had any fevers, chest pain, shortness of breath, or dizziness.  She has a history of nasal polyps for which she takes 325 mg of aspirin twice daily.  She has been on this treatment for the past 4 years.    REVIEW OF SYSTEMS  Review of Systems   Constitutional:  Negative for fever.   Respiratory:  Negative for shortness of breath.    Cardiovascular:  Negative for chest pain.   Gastrointestinal:  Positive for abdominal pain, diarrhea, melena (Black stool, possible melena) and nausea. Negative for vomiting.   Genitourinary:  Negative for dysuria, frequency, hematuria and urgency.   Neurological:  Negative for dizziness.       PAST MEDICAL HISTORY  Patient Active Problem List    Diagnosis Date Noted    Swollen lymph nodes 11/11/2024    Multiple joint pain 11/11/2024    Hyperglycemia 11/11/2024    Neck swelling 09/03/2024    Family history of thyroid disorder 09/03/2024    Enlarged thyroid 09/03/2024    Elevated LDL cholesterol level 09/03/2024    Weight gain 09/03/2024    Elevated blood pressure reading 09/03/2024    Hemorrhoids 02/24/2024    Anemia 02/24/2024    Complete tear of right ACL, initial encounter 09/07/2022    Left ACL tear 08/25/2022    High blood pressure 10/04/2021    History of high blood pressure 10/04/2021    Aspirin-exacerbated respiratory disease (AERD) 09/12/2021     Acute hypoxemic respiratory failure due to COVID-19 ([facility]) 09/11/2021    Migraines 09/11/2021    HSV infection 09/11/2021    Pneumonia due to COVID-19 virus 09/07/2021    Class 3 severe obesity in adult ([facility]) 09/07/2021    Asthma 09/07/2021    Abnormal weight gain 09/16/2009    Hair loss 09/16/2009    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    Dermatitis 09/16/2009    Allergic rhinitis 09/16/2009    Insomnia 09/16/2009    Family history of other chronic respiratory conditions 09/16/2009    Open wound of anterior abdominal wall 09/16/2009    Other malaise and fatigue 09/16/2009    Depressive disorder, not elsewhere classified 09/16/2009    Migraine 09/16/2009    Other atopic dermatitis and related conditions 09/16/2009       SURGICAL HISTORY   has a past surgical history that includes primary c section (x 3); tubal coagulation laparoscopic bilateral; abdominal hysterectomy total (8/29/08); abdominoplasty (8/10/2012); liposuction (8/10/2012); lesion excision general (8/10/2012); gyn surgery (08/2000); gyn surgery (07/2003); gyn surgery (07/2004); gyn surgery (08/2008); other abdominal surgery (08/2012); other (06/2014); repair of nasal septum (Bilateral, 2/11/2021); stereotactic computer assisted procedure cranial, extradural (Bilateral, 2/11/2021); endoscopy, paranasal sinus, with total ethmoidectomy, sn (Bilateral, 2/11/2021); sinus washing (Bilateral, 2/18/2021); and knee scope,aid ant cruciate repair (Left, 9/7/2022).    ALLERGIES  Allergies   Allergen Reactions    Aspirin      Patient states de-sensitized. Currently taking ASA 325mg BID    Ibuprofen Anaphylaxis and Rash     Patient states de-sensitized    Sulfa Drugs      Airway closes.    Latex      Rash and itching    Tape      Blister at the site.       CURRENT MEDICATIONS  Home Medications       Reviewed by Caitlin Kim P.A.-C. (Physician Assistant) on 12/02/24 at 0712  Med List Status: <None>     Medication Last Dose  "Status   albuterol 108 (90 Base) MCG/ACT Aero Soln inhalation aerosol PRN Active   amitriptyline (ELAVIL) 25 MG Tab Taking Active   amoxicillin (AMOXIL) 500 MG Cap Not Taking Active   aspirin (ASA) 325 MG Tab Taking Active   budesonide (PULMICORT) 0.5 MG/2ML Suspension Taking Active   fexofenadine (ALLEGRA) 180 MG tablet Taking Active   fluconazole (DIFLUCAN) 150 MG tablet Not Taking Active   Fluticasone Furoate-Vilanterol (BREO) 100-25 MCG/INH AEROSOL POWDER, BREATH ACTIVATED Taking Active   methylPREDNISolone (MEDROL DOSEPAK) 4 MG Tablet Therapy Pack Not Taking Active   montelukast (SINGULAIR) 10 MG Tab Taking Active   onabotulinum toxin A (Botox) injection 155 Units  Active   Rimegepant Sulfate (NURTEC) 75 MG TABLET DISPERSIBLE Taking Active   valACYclovir (VALTREX) 500 MG Tab Taking Active                    SOCIAL HISTORY  Social History     Tobacco Use    Smoking status: Never     Passive exposure: Never    Smokeless tobacco: Never   Vaping Use    Vaping status: Never Used   Substance and Sexual Activity    Alcohol use: Yes     Comment: occasional    Drug use: Never    Sexual activity: Not on file       FAMILY HISTORY  Family History   Problem Relation Age of Onset    Other Mother         medical drug toxicity: warfarin, methadone    Thyroid Mother     Hypertension Father         stronf family hx. on dad's side of HTN    Cancer Father         stomach    Thyroid Sister     Thyroid Maternal Aunt     Thyroid Maternal Grandmother           Objective:     VITAL SIGNS: /88 (BP Location: Left arm, Patient Position: Sitting, BP Cuff Size: Adult)   Pulse (!) 122   Temp 36.6 °C (97.8 °F) (Temporal)   Resp 18   Ht 1.549 m (5' 1\")   Wt 115 kg (252 lb 9.6 oz)   SpO2 95%   BMI 47.73 kg/m²     PHYSICAL EXAM  Physical Exam  Vitals reviewed.   Constitutional:       General: She is not in acute distress.     Appearance: Normal appearance. She is not ill-appearing, toxic-appearing or diaphoretic.   HENT:      Head: " Normocephalic and atraumatic.      Mouth/Throat:      Mouth: Mucous membranes are moist.   Eyes:      Conjunctiva/sclera: Conjunctivae normal.      Pupils: Pupils are equal, round, and reactive to light.   Pulmonary:      Effort: Pulmonary effort is normal. No respiratory distress.   Abdominal:      General: Abdomen is flat. Bowel sounds are normal. There is no distension.      Palpations: Abdomen is soft. There is no mass.      Tenderness: There is abdominal tenderness in the epigastric area, suprapubic area and left lower quadrant. There is no guarding or rebound. Negative signs include Feliciano's sign and McBurney's sign.      Hernia: No hernia is present.       Skin:     General: Skin is warm and dry.      Coloration: Skin is not jaundiced or pale.   Neurological:      General: No focal deficit present.      Mental Status: She is alert and oriented to person, place, and time.   Psychiatric:         Mood and Affect: Mood normal.         Assessment/Plan:     1. Generalized abdominal pain  - POCT Urinalysis  - POCT Pregnancy    2. Black stool    3. NSAID long-term use    4. Diarrhea, unspecified type  -Patient has been referred to the emergency department for concern regarding a potential GI bleed    MDM/Comments:  Patient is displaying systemic symptoms including tachycardia on physical examination. These symptoms are likely contributed to abdominal pain. Patient has stable vital signs with tachycardia and is non-toxic appearing.  Urinalysis obtained in office without evidence of acute cystitis.  There are small ketones in the urine.  hCG negative.  Patient has been experiencing abdominal pain for the past 24 hours and has developed black, sticky stool.  She has epigastric and suprapubic/left lower quadrant tenderness to palpation.  I am concerned that the black stool may be secondary to a GI bleed versus possible Pepto-Bismol.  Patient is at risk for GI bleed considering long-term use of aspirin for treatment of  nasal polyps.  Patient has been referred to the emergency department for further investigation into symptoms.  The [facility] transfer center has been called ahead to inform of impending transfer.  Patient will be driven via private vehicle.      Differential diagnosis, natural history, supportive care, and indications for immediate follow-up discussed. All questions answered. Patient agrees with the plan of care.    Follow-up as needed if symptoms worsen or fail to improve to PCP, Urgent care or Emergency Room.    I have personally reviewed prior external notes and test results pertinent to today's visit.  I have independently reviewed and interpreted all diagnostics ordered during this urgent care acute visit.   Discussed management options (risks,benefits, and alternatives to treatment). Pt expresses understanding and the treatment plan was agreed upon. Questions were encouraged and answered to pt's satisfaction.    Please note that this dictation was created using voice recognition software. I have made a reasonable attempt to correct obvious errors, but I expect that there are errors of grammar and possibly content that I did not discover before finalizing the note.

## 2024-12-03 NOTE — ED TRIAGE NOTES
Chief Complaint   Patient presents with    Abdominal Pain     Pt reports generalized abd pain with increased pain with palpation to lower abd. +diarrhea- dark tarry stool. Sent from  for possible GI bleed +nausea -vomiting       Pt to triage with steady gait for above complaint. Presents with generalized abd pain, pt reports dark tarry diarrhea today and nausea. Takes daily ASA    Protocol ordered    Pt back to lobby, educated on triage process and encourage to alert staff of any changes.     Vitals:    12/02/24 1926   BP: (!) 167/114   Pulse:    Resp:    Temp:    SpO2:

## 2024-12-03 NOTE — ED NOTES
BP remains above 200 systolic. ERP confirmed safe for discharge. Patient denies any symptoms, states BP normally normal and only goes up when in the ER. Patient provided discharge instructions. Patient verbalized understanding. Patient leaving ER in stable condition. Patient ambulatory with steady gait. IV removed.

## 2024-12-03 NOTE — ED NOTES
Bedside report received from off going RN: Annie, assumed care of patient.  POC discussed with patient. Call light within reach, all needs addressed at this time.       Fall risk interventions in place: Not Applicable (all applicable per [place] Fall risk assessment)   Continuous monitoring: Pulse Ox  IVF/IV medications: Not Applicable   Oxygen: Room Air  Bedside sitter: Not Applicable   Isolation: Not Applicable

## 2024-12-03 NOTE — ED PROVIDER NOTES
ER Provider Note    Scribed for Dr. Romero Lockett MD. by Argenis Painter. 12/2/2024  8:45 PM    Primary Care Provider: Cheyenne Freed P.A.-C.    CHIEF COMPLAINT  Chief Complaint   Patient presents with    Abdominal Pain     Pt reports generalized abd pain with increased pain with palpation to lower abd. +diarrhea- dark tarry stool. Sent from  for possible GI bleed +nausea -vomiting       EXTERNAL RECORDS REVIEWED  Outpatient Notes Patient was seen at  today 12/02/2024 for generalized abdominal pain.    HPI/ROS  LIMITATION TO HISTORY   Select: : None    OUTSIDE HISTORIAN(S):  None    Guilherme Flaherty is a 39 y.o. female who presents to the ED for evaluation of constant abdominal pain onset one day ago. The patient reports diarrhea onset about nine hours ago. She describes dark tarry stool. She states when drinking her water, her nausea worsens. The patient notes the pain has been severe as it has caused difficulty sleeping. Patient was sent from  for possible GI bleed. She adds she has been on aspirin for four years.     PAST MEDICAL HISTORY  Past Medical History:   Diagnosis Date    Abnormal weight gain     Allergic rhinitis, cause unspecified     Alopecia, unspecified     Asthma 02/04/2021    Inhaler use daily and PRN.    Depressive disorder, not elsewhere classified     Dermatitis with stretch marks opening    Environmental allergies     Cats, live stock, dogs, hola brush etc...    Family history of other chronic respiratory conditions     Insomnia, unspecified     Open wound of abdominal wall, anterior, without mention of complication     Other atopic dermatitis and related conditions     Other extrapyramidal disease and abnormal movement disorder     Other malaise and fatigue     Pap smear  10/2006    Pneumococcal infection     Pneumonia 2014    Snoring     Unspecified migraine        SURGICAL HISTORY  Past Surgical History:   Procedure Laterality Date    PB KNEE SCOPE,AID ANT CRUCIATE REPAIR Left 9/7/2022     Procedure: LEFT KNEE ARTHROSCOPY ANTERIOR CRUCIATE LIGAMENT RECONSTRUCTION WITH HAMSTRING AUTOGRAFT, REPAIRS AS INDICATED;  Surgeon: Marsha Arteaga M.D.;  Location: [facility];  Service: Orthopedics    SINUS WASHING Bilateral 2021    Procedure: IRRIGATION, PARANASAL SINUS - INTRAOP POSTOP SINUS DEBRIDEMENT;  Surgeon: Joao Luque M.D.;  Location: SURGERY SAME DAY [facility];  Service: Ent    NM REPAIR OF NASAL SEPTUM Bilateral 2021    Procedure: SEPTOPLASTY, NOSE;  Surgeon: Joao Luque M.D.;  Location: SURGERY SAME DAY [facility];  Service: Ent    STEREOTACTIC COMPUTER ASSISTED PROCEDURE CRANIAL, EXTRADURAL Bilateral 2021    Procedure: STEREOTACTIC COMPUTER ASSISTED PROCEDURE CRANIAL,EXTRADURAL-NAVIGATIONAL;  Surgeon: Joao Luque M.D.;  Location: SURGERY SAME DAY [facility];  Service: Ent    ENDOSCOPY, PARANASAL SINUS, WITH TOTAL ETHMOIDECTOMY, SN Bilateral 2021    Procedure: ENDOSCOPY, PARANASAL SINUS, WITH TOTAL ETHMOIDECTOMY, SPHENOIDOTOMY, MAXILLARY ANTROSTOMY, SPHEN+MAX SIN TISS REM, AND EXPLORATION FRONT SIN;  Surgeon: Joao Luque M.D.;  Location: SURGERY SAME DAY [facility];  Service: Ent    OTHER  2014    Sinus    ABDOMINOPLASTY  8/10/2012    Performed by SRINIVAS GOMEZ at [facility] ORS    LIPOSUCTION  8/10/2012    Performed by SRINIVAS GOMEZ at [facility] ORS    LESION EXCISION GENERAL  8/10/2012    Performed by SRINIVAS GOMEZ at [facility] ORS    OTHER ABDOMINAL SURGERY  2012    Tummy tuck.    ABDOMINAL HYSTERECTOMY TOTAL  08    Performed by EMILIE CASTRO at SURGERY SAME DAY [facility] ORS    GYN SURGERY  2008    Parial hysterectom    GYN SURGERY  2004        GYN SURGERY  2003        GYN SURGERY  2000        PRIMARY C SECTION  x 3    TUBAL COAGULATION LAPAROSCOPIC BILATERAL         FAMILY HISTORY  Family History   Problem Relation Age of Onset    Other Mother         " medical drug toxicity: warfarin, methadone    Thyroid Mother     Hypertension Father         stronf family hx. on dad's side of HTN    Cancer Father         stomach    Thyroid Sister     Thyroid Maternal Aunt     Thyroid Maternal Grandmother        SOCIAL HISTORY   reports that she has never smoked. She has never been exposed to tobacco smoke. She has never used smokeless tobacco. She reports current alcohol use. She reports that she does not use drugs.    CURRENT MEDICATIONS  Discharge Medication List as of 12/2/2024 10:42 PM        CONTINUE these medications which have NOT CHANGED    Details   fluconazole (DIFLUCAN) 150 MG tablet Take one tab weekly for 2 weeks., Disp-2 Tablet, R-1, Normal      budesonide (PULMICORT) 0.5 MG/2ML Suspension Historical Med      Rimegepant Sulfate (NURTEC) 75 MG TABLET DISPERSIBLE Take 1 Tablet by mouth 1 time a day as needed (migraine).holdDisp-8 Tablet, R-11, Normal      amitriptyline (ELAVIL) 25 MG Tab Take 1 Tablet by mouth every evening for 360 days., Disp-90 Tablet, R-3, Normal      valACYclovir (VALTREX) 500 MG Tab Take 1 Tablet by mouth every day., Disp-40 Tablet, R-0, Print Rx Paper      fexofenadine (ALLEGRA) 180 MG tablet Take 180 mg by mouth every day., Historical Med      Fluticasone Furoate-Vilanterol (BREO) 100-25 MCG/INH AEROSOL POWDER, BREATH ACTIVATED Inhale 1 Puff every day., Historical Med      aspirin (ASA) 325 MG Tab Take 325 mg by mouth 2 times a day., Historical Med      montelukast (SINGULAIR) 10 MG Tab Take 10 mg by mouth every day., Historical Med      albuterol 108 (90 Base) MCG/ACT Aero Soln inhalation aerosol Inhale 2 Puffs every 6 hours as needed for Shortness of Breath., Historical Med             ALLERGIES  Aspirin, Ibuprofen, Sulfa drugs, Latex, and Tape    PHYSICAL EXAM  BP (!) 219/118   Pulse (!) 109   Temp 35.9 °C (96.7 °F) (Temporal)   Resp 16   Ht 1.549 m (5' 1\")   Wt 114 kg (252 lb 3.3 oz)   SpO2 94%   BMI 47.65 kg/m²   Physical " Exam  Vitals and nursing note reviewed.   Constitutional:       Appearance: She is well-developed. She is obese.   HENT:      Head: Normocephalic.   Eyes:      Extraocular Movements: Extraocular movements intact.      Pupils: Pupils are equal, round, and reactive to light.   Cardiovascular:      Rate and Rhythm: Regular rhythm.   Pulmonary:      Effort: Pulmonary effort is normal.      Breath sounds: Normal breath sounds.   Abdominal:      Palpations: Abdomen is soft.      Tenderness: There is abdominal tenderness (Mild) in the left lower quadrant.   Musculoskeletal:      Cervical back: Normal range of motion.   Neurological:      Mental Status: She is alert and oriented to person, place, and time.       DIAGNOSTIC STUDIES & PROCEDURES    Labs:   Results for orders placed or performed during the hospital encounter of 12/02/24   COD (ADULT)    Collection Time: 12/02/24  7:42 PM   Result Value Ref Range    ABO Grouping Only A     Rh Grouping Only POS     Antibody Screen-Cod NEG    CBC WITH DIFFERENTIAL    Collection Time: 12/02/24  7:42 PM   Result Value Ref Range    WBC 4.9 4.8 - 10.8 K/uL    RBC 5.08 4.20 - 5.40 M/uL    Hemoglobin 15.7 12.0 - 16.0 g/dL    Hematocrit 45.8 37.0 - 47.0 %    MCV 90.2 81.4 - 97.8 fL    MCH 30.9 27.0 - 33.0 pg    MCHC 34.3 32.2 - 35.5 g/dL    RDW 45.4 35.9 - 50.0 fL    Platelet Count 233 164 - 446 K/uL    MPV 9.5 9.0 - 12.9 fL    Neutrophils-Polys 74.70 (H) 44.00 - 72.00 %    Lymphocytes 16.30 (L) 22.00 - 41.00 %    Monocytes 7.40 0.00 - 13.40 %    Eosinophils 0.20 0.00 - 6.90 %    Basophils 1.00 0.00 - 1.80 %    Immature Granulocytes 0.40 0.00 - 0.90 %    Nucleated RBC 0.00 0.00 - 0.20 /100 WBC    Neutrophils (Absolute) 3.63 1.82 - 7.42 K/uL    Lymphs (Absolute) 0.79 (L) 1.00 - 4.80 K/uL    Monos (Absolute) 0.36 0.00 - 0.85 K/uL    Eos (Absolute) 0.01 0.00 - 0.51 K/uL    Baso (Absolute) 0.05 0.00 - 0.12 K/uL    Immature Granulocytes (abs) 0.02 0.00 - 0.11 K/uL    NRBC (Absolute) 0.00  K/uL   COMP METABOLIC PANEL    Collection Time: 12/02/24  7:42 PM   Result Value Ref Range    Sodium 142 135 - 145 mmol/L    Potassium 4.0 3.6 - 5.5 mmol/L    Chloride 107 96 - 112 mmol/L    Co2 21 20 - 33 mmol/L    Anion Gap 14.0 7.0 - 16.0    Glucose 92 65 - 99 mg/dL    Bun 10 8 - 22 mg/dL    Creatinine 0.70 0.50 - 1.40 mg/dL    Calcium 8.6 8.5 - 10.5 mg/dL    Correct Calcium 8.4 (L) 8.5 - 10.5 mg/dL    AST(SGOT) 43 12 - 45 U/L    ALT(SGPT) 42 2 - 50 U/L    Alkaline Phosphatase 68 30 - 99 U/L    Total Bilirubin 0.4 0.1 - 1.5 mg/dL    Albumin 4.2 3.2 - 4.9 g/dL    Total Protein 7.4 6.0 - 8.2 g/dL    Globulin 3.2 1.9 - 3.5 g/dL    A-G Ratio 1.3 g/dL   LIPASE    Collection Time: 12/02/24  7:42 PM   Result Value Ref Range    Lipase 14 11 - 82 U/L   PROTHROMBIN TIME    Collection Time: 12/02/24  7:42 PM   Result Value Ref Range    PT 13.5 12.0 - 14.6 sec    INR 1.03 0.87 - 1.13   APTT    Collection Time: 12/02/24  7:42 PM   Result Value Ref Range    APTT 29.2 24.7 - 36.0 sec   HCG QUAL SERUM    Collection Time: 12/02/24  7:42 PM   Result Value Ref Range    Beta-Hcg Qualitative Serum Negative Negative   ESTIMATED GFR    Collection Time: 12/02/24  7:42 PM   Result Value Ref Range    GFR (CKD-EPI) 112 >60 mL/min/1.73 m 2     All labs reviewed by me.    EKG:   I have independently interpreted this EKG       Radiology:   The attending Emergency Physician has independently interpreted the diagnostic imaging associated with this visit and is awaiting the final reading from the radiologist, which will be displayed below.    Preliminary interpretation is a follows: CT abdomen consistent with mesenteric adenitis  Radiologist interpretation:    CT-ABDOMEN-PELVIS WITH   Final Result         1.  Small left adrenal nodule, indeterminate, follow-up adrenal protocol CT for further characterization as clinically appropriate   2.  Bilateral enlarged mesenteric lymph nodes, consider changes of mesenteric adenitis or other causes of  adenopathy with additional workup as clinically appropriate           COURSE & MEDICAL DECISION MAKING    INITIAL ASSESSMENT AND PLAN  Care Narrative:   Hydration: Based on the patient's presentation of Dehydration the patient was given IV fluids. IV Hydration was used because oral hydration was not adequate alone. Upon recheck following hydration, the patient was improved.      8:45 PM - Patient seen and evaluated at bedside.     10:52 PM - I reevaluated the patient at bedside. I discussed the patient's diagnostic study results. I discussed plan for discharge and follow up as outlined below. The patient is stable for discharge at this time and will return for any new or worsening symptoms. Patient verbalizes understanding and support with my plan for discharge.     ADDITIONAL PROBLEM LIST AND DISPOSITION  Past Medical History:   Diagnosis Date    Abnormal weight gain     Allergic rhinitis, cause unspecified     Alopecia, unspecified     Asthma 02/04/2021    Inhaler use daily and PRN.    Depressive disorder, not elsewhere classified     Dermatitis with stretch marks opening    Environmental allergies     Cats, live stock, dogs, hola brush etc...    Family history of other chronic respiratory conditions     Insomnia, unspecified     Open wound of abdominal wall, anterior, without mention of complication     Other atopic dermatitis and related conditions     Other extrapyramidal disease and abnormal movement disorder     Other malaise and fatigue     Pap smear  10/2006    Pneumococcal infection     Pneumonia 2014    Snoring     Unspecified migraine                   DISPOSITION AND DISCUSSIONS  39-year-old female referred from urgent care for persistent abdominal pain, nausea, vomiting and black-colored diarrhea with concern for potential GI bleed as the patient chronically takes aspirin for nasal polyps.  On exam the patient's profoundly hypertensive and mildly tachycardic otherwise with mild lower abdominal  tenderness without peritoneal signs.  Additional workup obtained and overall reassuring with no leukocytosis, no metabolic derangements, no electrolyte abnormalities.  CT demonstrates likely mesenteric adenitis making it likely viral gastroenteritis that is causing the patient's symptoms.  Her hemoglobin is normal here and overall I have incredibly low suspicion for upper GI bleed.  After receiving IV rehydration the patient's tachycardia resolved and after symptomatic control blood pressure is now downtrending.  Patient given strict return precautions for worsening of symptoms otherwise educated on antiemetics and supportive care      I have discussed management of the patient with the following physicians and ARI's: None    Discussion of management with other [facility] or appropriate source(s): None     Escalation of care considered, and ultimately not performed: .    Barriers to care at this time, including but not limited to:  None .     Decision tools and prescription drugs considered including, but not limited to:  Antiemetics Zofran .    The patient will return for new or worsening symptoms and is stable at the time of discharge.    The patient is referred to a primary physician for blood pressure management, diabetic screening, and for all other preventative health concerns.    DISPOSITION:  Patient will be discharged home in stable condition.    FOLLOW UP:  Cheyenne Freed P.A.-C.  [address] [id number] [phone]          [facility], Emergency Dept  [address] [facility] [address] [phone]          OUTPATIENT MEDICATIONS:  Discharge Medication List as of 12/2/2024 10:42 PM        START taking these medications    Details   ondansetron (ZOFRAN ODT) 4 MG TABLET DISPERSIBLE Take 1 Tablet by mouth every 6 hours as needed for Nausea/Vomiting., Disp-20 Tablet, R-0, Normal           FINAL IMPRESSION   1. Mesenteric adenitis    2. Nausea         I,  Argenis Painter (Scribbrenda), am scribing for, and in the presence of, Romero Lockett M.D..    Electronically signed by: Argenis Painter (Michael), 12/2/2024    IRomero M.D. personally performed the services described in this documentation, as scribed by Argenis Painter in my presence, and it is both accurate and complete.    The note accurately reflects work and decisions made by me.  Romero Lockett M.D.  12/3/2024  12:24 AM

## 2024-12-10 ENCOUNTER — HOSPITAL ENCOUNTER (OUTPATIENT)
Dept: RADIOLOGY | Facility: MEDICAL CENTER | Age: 39
End: 2024-12-10
Attending: PHYSICIAN ASSISTANT
Payer: COMMERCIAL

## 2024-12-10 DIAGNOSIS — R59.9 SWOLLEN LYMPH NODES: ICD-10-CM

## 2024-12-10 PROCEDURE — 71046 X-RAY EXAM CHEST 2 VIEWS: CPT

## 2024-12-26 ENCOUNTER — HOSPITAL ENCOUNTER (OUTPATIENT)
Dept: RADIOLOGY | Facility: MEDICAL CENTER | Age: 39
End: 2024-12-26
Attending: OTOLARYNGOLOGY
Payer: COMMERCIAL

## 2024-12-26 DIAGNOSIS — R59.0 VIRCHOW'S NODE: ICD-10-CM

## 2024-12-26 PROCEDURE — 70491 CT SOFT TISSUE NECK W/DYE: CPT

## 2024-12-26 PROCEDURE — 700117 HCHG RX CONTRAST REV CODE 255: Performed by: OTOLARYNGOLOGY

## 2024-12-26 RX ADMIN — IOHEXOL 80 ML: 350 INJECTION, SOLUTION INTRAVENOUS at 09:02

## 2024-12-27 LAB
ANA SER QL IF: NEGATIVE
C-ANCA TITR SER IF: NORMAL TITER
C3 SERPL-MCNC: 159 MG/DL (ref 82–167)
C4 SERPL-MCNC: 19 MG/DL (ref 12–38)
CCP IGA+IGG SERPL IA-ACNC: 7 UNITS (ref 0–19)
CH50 SERPL-ACNC: >60 U/ML
CRP SERPL HS-MCNC: 11.25 MG/L (ref 0–3)
DSDNA AB SER-ACNC: 1 IU/ML (ref 0–9)
ENA SM AB SER-ACNC: <0.2 AI (ref 0–0.9)
ERYTHROCYTE [SEDIMENTATION RATE] IN BLOOD BY WESTERGREN METHOD: 11 MM/HR (ref 0–32)
MYELOPEROXIDASE AB SER IA-ACNC: <0.2 UNITS (ref 0–0.9)
P-ANCA ATYPICAL TITR SER IF: NORMAL TITER
P-ANCA TITR SER IF: NORMAL TITER
PROTEINASE3 AB SER IA-ACNC: <0.2 UNITS (ref 0–0.9)
RHEUMATOID FACT SERPL-ACNC: <10 IU/ML
THYROGLOB AB SERPL-ACNC: <1 IU/ML (ref 0–0.9)
THYROPEROXIDASE AB SERPL-ACNC: 11 IU/ML (ref 0–34)

## 2025-01-03 PROCEDURE — RXMED WILLOW AMBULATORY MEDICATION CHARGE: Performed by: PSYCHIATRY & NEUROLOGY

## 2025-01-07 ENCOUNTER — PRE-ADMISSION TESTING (OUTPATIENT)
Dept: ADMISSIONS | Facility: MEDICAL CENTER | Age: 40
End: 2025-01-07
Attending: OTOLARYNGOLOGY
Payer: COMMERCIAL

## 2025-01-07 VITALS — HEIGHT: 61 IN | BODY MASS INDEX: 47.65 KG/M2

## 2025-01-07 DIAGNOSIS — Z01.812 PRE-OPERATIVE LABORATORY EXAMINATION: ICD-10-CM

## 2025-01-07 NOTE — PREPROCEDURE INSTRUCTIONS
Pt preadmitted 1/7/25.  Pt instructed to follow radiology instructions,  regarding medications and NPO status      Confirmed with patient where to check in morning of surgery. Handouts/Brochure/Video emailed to patient.

## 2025-01-08 ENCOUNTER — APPOINTMENT (OUTPATIENT)
Dept: ADMISSIONS | Facility: MEDICAL CENTER | Age: 40
End: 2025-01-08
Attending: OTOLARYNGOLOGY
Payer: COMMERCIAL

## 2025-01-08 DIAGNOSIS — Z01.812 PRE-OPERATIVE LABORATORY EXAMINATION: ICD-10-CM

## 2025-01-08 LAB
ERYTHROCYTE [DISTWIDTH] IN BLOOD BY AUTOMATED COUNT: 46 FL (ref 35.9–50)
HCT VFR BLD AUTO: 46.3 % (ref 37–47)
HGB BLD-MCNC: 15 G/DL (ref 12–16)
INR PPP: 0.93 (ref 0.87–1.13)
MCH RBC QN AUTO: 29.8 PG (ref 27–33)
MCHC RBC AUTO-ENTMCNC: 32.4 G/DL (ref 32.2–35.5)
MCV RBC AUTO: 92 FL (ref 81.4–97.8)
PLATELET # BLD AUTO: 348 K/UL (ref 164–446)
PMV BLD AUTO: 10 FL (ref 9–12.9)
PROTHROMBIN TIME: 12.8 SEC (ref 12–14.6)
RBC # BLD AUTO: 5.03 M/UL (ref 4.2–5.4)
WBC # BLD AUTO: 10.7 K/UL (ref 4.8–10.8)

## 2025-01-08 PROCEDURE — 85610 PROTHROMBIN TIME: CPT

## 2025-01-08 PROCEDURE — 36415 COLL VENOUS BLD VENIPUNCTURE: CPT

## 2025-01-08 PROCEDURE — 85027 COMPLETE CBC AUTOMATED: CPT

## 2025-01-10 ENCOUNTER — APPOINTMENT (OUTPATIENT)
Dept: RADIOLOGY | Facility: MEDICAL CENTER | Age: 40
End: 2025-01-10
Attending: OTOLARYNGOLOGY
Payer: COMMERCIAL

## 2025-01-10 ENCOUNTER — HOSPITAL ENCOUNTER (OUTPATIENT)
Facility: MEDICAL CENTER | Age: 40
End: 2025-01-10
Attending: OTOLARYNGOLOGY | Admitting: OTOLARYNGOLOGY
Payer: COMMERCIAL

## 2025-01-10 VITALS
OXYGEN SATURATION: 97 % | RESPIRATION RATE: 18 BRPM | WEIGHT: 255 LBS | HEIGHT: 61 IN | BODY MASS INDEX: 48.15 KG/M2 | HEART RATE: 101 BPM

## 2025-01-10 DIAGNOSIS — R59.0 VIRCHOW'S NODE: ICD-10-CM

## 2025-01-10 LAB — PATHOLOGY CONSULT NOTE: NORMAL

## 2025-01-10 PROCEDURE — 88305 TISSUE EXAM BY PATHOLOGIST: CPT | Performed by: PATHOLOGY

## 2025-01-10 PROCEDURE — 88305 TISSUE EXAM BY PATHOLOGIST: CPT | Mod: 26 | Performed by: PATHOLOGY

## 2025-01-10 PROCEDURE — 88185 FLOWCYTOMETRY/TC ADD-ON: CPT | Mod: 91

## 2025-01-10 PROCEDURE — 88189 FLOWCYTOMETRY/READ 16 & >: CPT | Performed by: PATHOLOGY

## 2025-01-10 PROCEDURE — 4410509 US-NEEDLE BX-LYMPH NODE

## 2025-01-10 PROCEDURE — 88184 FLOWCYTOMETRY/ TC 1 MARKER: CPT

## 2025-01-10 RX ORDER — MIDAZOLAM HYDROCHLORIDE 1 MG/ML
INJECTION INTRAMUSCULAR; INTRAVENOUS
Status: DISCONTINUED
Start: 2025-01-10 | End: 2025-01-11 | Stop reason: HOSPADM

## 2025-01-10 ASSESSMENT — FIBROSIS 4 INDEX: FIB4 SCORE: 0.76

## 2025-01-10 NOTE — PROGRESS NOTES
Pt presents to [facility]. Pt was consented by MD at bedside, confirmed by this RN and consent signed at bedside. Pt transferred to procedure table in supine position. Patient underwent a right cervical neck lymph node biopsy by Dr. Benjamin. Procedure site was marked by MD and verified using imaging guidance. Pt placed on monitor, prepped and draped in a sterile fashion. Vitals were taken every 5 minutes and remained stable during procedure (see doc flow sheet for results). CO2 waveform capnography was monitored and remained WNL throughout procedure. Report called to *** RN. Pt transported by stretcher with RN to ***.       Specimen: *** in RPMI and  in formalin hand delivered to lab.

## 2025-01-10 NOTE — PROGRESS NOTES
US guided left neck mass core biopsy done by Dr. Benjamin; NON-SEDATION (no H&P required as this is NON SEDATION procedure) left access site, dressing CDI; Cores X 2  in 1 jar Formalin. Cores X 1 in RPMI obtained and sent to lab. Pt tolerated the procedure well. Pt hemodynamically stable pre/intra/post procedure; all questions and concerns answered prior to being d/c; patient provided with appropriate education for procedure; pt d/c home.

## 2025-01-16 ENCOUNTER — HOSPITAL ENCOUNTER (OUTPATIENT)
Dept: LAB | Facility: MEDICAL CENTER | Age: 40
End: 2025-01-16
Attending: PHYSICIAN ASSISTANT
Payer: COMMERCIAL

## 2025-01-16 ENCOUNTER — HOSPITAL ENCOUNTER (OUTPATIENT)
Dept: RADIOLOGY | Facility: MEDICAL CENTER | Age: 40
End: 2025-01-16
Attending: PHYSICIAN ASSISTANT
Payer: COMMERCIAL

## 2025-01-16 ENCOUNTER — OFFICE VISIT (OUTPATIENT)
Dept: MEDICAL GROUP | Facility: CLINIC | Age: 40
End: 2025-01-16
Payer: COMMERCIAL

## 2025-01-16 VITALS
SYSTOLIC BLOOD PRESSURE: 192 MMHG | TEMPERATURE: 98 F | DIASTOLIC BLOOD PRESSURE: 102 MMHG | HEIGHT: 61 IN | RESPIRATION RATE: 18 BRPM | OXYGEN SATURATION: 98 % | WEIGHT: 259.26 LBS | HEART RATE: 100 BPM | BODY MASS INDEX: 48.95 KG/M2

## 2025-01-16 DIAGNOSIS — J98.11 ATELECTASIS: ICD-10-CM

## 2025-01-16 DIAGNOSIS — E27.9 ADRENAL NODULE (HCC): ICD-10-CM

## 2025-01-16 DIAGNOSIS — R59.9 SWOLLEN LYMPH NODES: ICD-10-CM

## 2025-01-16 DIAGNOSIS — I10 HYPERTENSION, UNSPECIFIED TYPE: ICD-10-CM

## 2025-01-16 PROCEDURE — 82088 ASSAY OF ALDOSTERONE: CPT

## 2025-01-16 PROCEDURE — 84244 ASSAY OF RENIN: CPT

## 2025-01-16 PROCEDURE — 3080F DIAST BP >= 90 MM HG: CPT | Performed by: PHYSICIAN ASSISTANT

## 2025-01-16 PROCEDURE — 82024 ASSAY OF ACTH: CPT

## 2025-01-16 PROCEDURE — 36415 COLL VENOUS BLD VENIPUNCTURE: CPT

## 2025-01-16 PROCEDURE — 3077F SYST BP >= 140 MM HG: CPT | Performed by: PHYSICIAN ASSISTANT

## 2025-01-16 PROCEDURE — 99214 OFFICE O/P EST MOD 30 MIN: CPT | Performed by: PHYSICIAN ASSISTANT

## 2025-01-16 PROCEDURE — 74181 MRI ABDOMEN W/O CONTRAST: CPT

## 2025-01-16 PROCEDURE — 82384 ASSAY THREE CATECHOLAMINES: CPT

## 2025-01-16 RX ORDER — AMLODIPINE BESYLATE 10 MG/1
10 TABLET ORAL DAILY
Qty: 100 TABLET | Refills: 3 | Status: SHIPPED | OUTPATIENT
Start: 2025-01-16 | End: 2026-02-20

## 2025-01-16 ASSESSMENT — FIBROSIS 4 INDEX: FIB4 SCORE: 0.76

## 2025-01-16 ASSESSMENT — PATIENT HEALTH QUESTIONNAIRE - PHQ9: CLINICAL INTERPRETATION OF PHQ2 SCORE: 0

## 2025-01-16 NOTE — PROGRESS NOTES
cc:  adrenal nodule    Subjective:     Guilherme Flaherty is a 39 y.o. female presenting for adrenal nodule        History of Present Illness  The patient is a 39-year-old female who presents to the office today for an emergency room follow-up. She was seen in the emergency room on 12/02/2024 for abdominal pain. At the time, a CT scan demonstrated possible mesenteric adenitis, which was thought to be related to viral gastroenteritis. However, the CT scan also revealed an adrenal nodule, and further workup is recommended. In the meantime, her blood pressure has not been controlled, and she has never had an issue with her blood pressure before. Currently, in the office, it is 192/102.    She reports experiencing sudden severe abdominal pain, which resolved the following day. She also noticed black stools, raising concerns about a potential gastrointestinal bleed. During a visit to urgent care, her blood pressure was normal, but due to concerns about a GI bleed, she was referred to the hospital. There, her blood pressure unexpectedly spiked to the 200s over the 100s. A CT scan revealed a mass on her adrenal gland, which was not believed to be the cause of her pain. The scan also showed slightly inflamed lymph nodes. She was prescribed morphine and Zofran but chose not to take the Zofran, managing her nausea and diarrhea without it. She underwent a chest x-ray, which revealed a partial collapse. During her annual ENT follow-up, a discussion about her lung condition and aspirin use led to the discovery of swollen lymph nodes, prompting a CT scan of her neck. The scan confirmed the swelling, leading to a biopsy that ruled out cancer. Despite these findings, her blood pressure remains elevated. She reports feeling well overall but is concerned about the persistent high blood pressure. Her ENT specialist expressed concern about the size of her adrenal nodule, suggesting it could be cancerous if over 10 units. However,  her blood work did not indicate any abnormalities. She has completed autoimmune blood work and reports persistent fatigue as her primary symptom. She is unsure about the size of her adrenal nodule but recalls her ENT specialist mentioning something about 10 units and 51 Hounsfield units. She has been inconsistent in monitoring her blood pressure at home, despite having a cuff that connects to her phone for tracking. She is not claustrophobic and does not require sedation for an MRI.    She is scheduled to see her allergist/pulmonologist next month for a breathing test. She is curious about the significance of the partial collapse and whether it is related to her previous COVID-19 infection. She was informed that there was no fluid present. A CT scan showed some damage pathways in the lower lungs, which she believes may be due to COVID-19 or asthma.    MEDICATIONS  Current: amitriptyline       Review of systems:  See above.   Denies any symptoms unless previously indicated.        Current Outpatient Medications:     amLODIPine (NORVASC) 10 MG Tab, Take 1 Tablet by mouth every day., Disp: 100 Tablet, Rfl: 3    Turmeric 5-1000 MG Cap, Take  by mouth every day., Disp: , Rfl:     ASHWAGANDHA PO, Take 1,000 mg by mouth every day., Disp: , Rfl:     budesonide (PULMICORT) 0.5 MG/2ML Suspension, Take  by nebulization., Disp: , Rfl:     Rimegepant Sulfate (NURTEC) 75 MG TABLET DISPERSIBLE, Take 1 Tablet by mouth 1 time a day as needed (migraine)., Disp: 8 Tablet, Rfl: 11    amitriptyline (ELAVIL) 25 MG Tab, Take 1 Tablet by mouth every evening for 360 days., Disp: 90 Tablet, Rfl: 3    valACYclovir (VALTREX) 500 MG Tab, Take 1 Tablet by mouth every day., Disp: 40 Tablet, Rfl: 0    fexofenadine (ALLEGRA) 180 MG tablet, Take 180 mg by mouth every day., Disp: , Rfl:     Fluticasone Furoate-Vilanterol (BREO) 100-25 MCG/INH AEROSOL POWDER, BREATH ACTIVATED, Inhale 1 Puff every day., Disp: , Rfl:     aspirin (ASA) 325 MG Tab, Take  "325 mg by mouth 2 times a day., Disp: , Rfl:     montelukast (SINGULAIR) 10 MG Tab, Take 10 mg by mouth every day., Disp: , Rfl:     albuterol 108 (90 Base) MCG/ACT Aero Soln inhalation aerosol, Inhale 2 Puffs every 6 hours as needed for Shortness of Breath., Disp: , Rfl:     Current Facility-Administered Medications:     onabotulinum toxin A (Botox) injection 155 Units, 155 Units, Injection, EVERY 12 WEEKS, , 155 Units at 11/26/24 1427    Allergies, past medical history, past surgical history, family history, social history reviewed and updated    Objective:     Vitals: BP (!) 192/102 (BP Location: Left arm, Patient Position: Sitting, BP Cuff Size: Large adult)   Pulse 100   Temp 36.7 °C (98 °F) (Temporal)   Resp 18   Ht 1.549 m (5' 1\")   Wt 118 kg (259 lb 4.2 oz)   SpO2 98%   BMI 48.99 kg/m²   General: Alert, pleasant, NAD  EYES:   PERRL, EOMI, no icterus or pallor.  Conjunctivae and lids normal.   HENT:  Normocephalic.  External ears normal.  Neck supple.     Respiratory: Normal respiratory effort.    Abdomen: obese  Skin: Warm, dry, no rashes.  Musculoskeletal: Gait is normal.  Moves all extremities well.    Extremities: normal range of motion all extremities.   Neurological: No tremors, sensation grossly intact, CN2-12 intact.  Psych:  Affect/mood is normal, judgement is good, memory is intact, grooming is appropriate.    Physical Exam  Vital Signs  Blood pressure is 192/102.       Results  CT-ABDOMEN-PELVIS WITH  Order: [id number]   Status: Final result       Visible to patient: Yes (seen)       Next appt: 02/18/2025 at 01:20 PM in Neurology (Phil Stinson M.D.)    0 Result Notes  Details    Reading Physician Reading Date Result Priority   Neville Castelan M.D.  [phone] 12/2/2024      Narrative & Impression    12/2/2024 8:57 PM     HISTORY/REASON FOR EXAM:  LLQ pain.     TECHNIQUE/EXAM DESCRIPTION:   CT scan of the abdomen and pelvis with contrast.     Contrast-enhanced helical scanning was " obtained from the diaphragmatic domes through the pubic symphysis following the bolus administration of nonionic contrast without complication.     100 mL of Omnipaque 350 nonionic contrast was administered without complication.     Low dose optimization technique was utilized for this CT exam including automated exposure control and adjustment of the mA and/or kV according to patient size.     COMPARISON: No prior studies available.     FINDINGS:     Lower Chest: Linear densities of the bilateral lung bases are seen favoring changes of atelectasis.     Liver: Normal.     Spleen: Unremarkable.     Pancreas: Unremarkable.     Gallbladder: No calcified stones.     Biliary: Nondilated.     Adrenal glands: 10 mm left adrenal nodule is seen measuring 51 Hounsfield units.     Kidneys: Unremarkable without hydronephrosis.     Bowel: No obstruction or acute inflammation. The appendix appears within normal limits.     Lymph nodes: Enlarged mesenteric lymph nodes are seen measuring up to 3 mm in short axis.     Vasculature: Unremarkable.     Peritoneum: Unremarkable without ascites.     Musculoskeletal: No acute or destructive process.     Pelvis: No adenopathy or free fluid. Postsurgical changes of hysterectomy are seen.     IMPRESSION:        1.  Small left adrenal nodule, indeterminate, follow-up adrenal protocol CT for further characterization as clinically appropriate  2.  Bilateral enlarged mesenteric lymph nodes, consider changes of mesenteric adenitis or other causes of adenopathy with additional workup as clinically appropriate         Imaging  CT demonstrated possible mesenteric adenitis and an adrenal nodule.      Latest Reference Range & Units 12/23/24 09:33   Anti-MPO Antibodies 0.0 - 0.9 units <0.2   Anti-PR3 Antibodies 0.0 - 0.9 units <0.2   Sed Rate Westergren 0 - 32 mm/hr 11   C-Reactive Protein Cardiac 0.00 - 3.00 mg/L 11.25 (H)   C3 Complement Serum 82 - 167 mg/dL 159   Complement C4 12 - 38 mg/dL 19    Complement Total Hemolytic >41 U/mL >60   Thyroglob Ab 0.0 - 0.9 IU/mL <1.0   Rheumatoid Factor -Neph- <14.0 IU/mL <10.0   Anti-Dna -Ds 0 - 9 IU/mL 1   Microsomal -Tpo- Abs 0 - 34 IU/mL 11   CCP Antibody IGG/IGA 0 - 19 units 7   [place] Antibodies 0.0 - 0.9 AI <0.2   Cytoplasmic -C-Anca Neg:<1:20 titer <1:20   Perinuclear -P-Anca Neg:<1:20 titer <1:20   Atypical Anca Neg:<1:20 titer <1:20   Antinuclear Ab -Marina- Ifa  Negative   (H): Data is abnormally high    Assessment/Plan:     Guilherme was seen today for results.    Diagnoses and all orders for this visit:    Adrenal nodule (HCC)  -     CATECHOLAMINES PLASMA FRACTIONATED; Future  -     LT-PLXAKYN-L/O; Future  -     Referral to Urology  -     Referral to Endocrinology  -     ACTH; Future  -     RENIN ACTIVITY AND ALDOSTERONE  -     CORTISOL, SALIVA; Future    Hypertension, unspecified type  -     amLODIPine (NORVASC) 10 MG Tab; Take 1 Tablet by mouth every day.    Swollen lymph nodes    Atelectasis        Assessment & Plan  1. Adrenal nodule.  The adrenal nodule is concerning and requires further evaluation. The patient was informed that most adrenal nodules are benign, but conditions like pheochromocytoma can be cancerous and cause high blood pressure. An MRI of the adrenal gland will be ordered stat. Referrals to urology and endocrinology will be made urgently. Laboratory tests will be conducted to assess catecholamines, adrenal cortical tropin hormone, renin, aldosterone, ACTH, and cortisol levels. These labs are not fasting and can be done at any time. The patient will be started on amlodipine 10 mg for blood pressure management. The potential side effect of ankle swelling was discussed, and she was advised to report any intolerable swelling. If necessary, the dosage can be reduced to half for a day or two before resuming the full dose. If the nodule is benign, an annual MRI will be conducted to monitor for any changes. If the nodule is cancerous, surgical  removal will be considered.    2. Elevated blood pressure.  The patient's blood pressure is significantly elevated at 192/102 in the office. She has been monitoring it at home, where it remains around 180-190/100s. She will be started on amlodipine 10 mg, with the option to cut the dose in half for the first few days if needed. The potential side effect of ankle swelling was discussed, and she was advised to report any intolerable swelling. If necessary, the dosage can be reduced to half for a day or two before resuming the full dose.    3. Swollen lymph nodes.  The patient has persistent swollen lymph nodes, despite a biopsy showing no cancer. The biopsy results were normal, but the swelling persists. Further evaluation will be conducted as part of the overall assessment of her condition.    4. Partial lung collapse.  The patient has a partial lung collapse, likely due to scarring from a previous COVID-19 infection. This condition is not dangerous to her health but is not reversible. She will continue with her scheduled appointment with her allergist/pulmonologist next month for a breathing test.    Follow-up  The patient will follow up in 2 to 3 weeks for a blood pressure recheck.    Return in about 2 weeks (around 1/30/2025), or if symptoms worsen or fail to improve, for 2-3 weeks labs  medication.  MRI if back.    Please note that this dictation was created using voice recognition software. I have made every reasonable attempt to correct obvious errors, but expect that there are errors of grammar and possible content that I did not discover before finalizing note.

## 2025-01-18 LAB
ACTH PLAS-MCNC: 17.8 PG/ML (ref 7.2–63.3)
ALDOST SERPL-MCNC: 13.9 NG/DL

## 2025-01-20 LAB
DOPAMINE SERPL-MCNC: 133 PMOL/L
EPINEPH PLAS-MCNC: <55 PMOL/L
NOREPINEPH PLAS-MCNC: 3425 PMOL/L (ref 1050–4800)

## 2025-01-22 LAB — RENIN PLAS-CCNC: 2 NG/ML/HR

## 2025-01-23 ENCOUNTER — PHARMACY VISIT (OUTPATIENT)
Dept: PHARMACY | Facility: MEDICAL CENTER | Age: 40
End: 2025-01-23
Payer: COMMERCIAL

## 2025-01-31 ENCOUNTER — HOSPITAL ENCOUNTER (OUTPATIENT)
Facility: MEDICAL CENTER | Age: 40
End: 2025-01-31
Attending: PHYSICIAN ASSISTANT
Payer: COMMERCIAL

## 2025-01-31 PROCEDURE — 82533 TOTAL CORTISOL: CPT

## 2025-02-01 DIAGNOSIS — E27.9 ADRENAL NODULE (HCC): ICD-10-CM

## 2025-02-04 ENCOUNTER — APPOINTMENT (OUTPATIENT)
Dept: MEDICAL GROUP | Facility: CLINIC | Age: 40
End: 2025-02-04
Payer: COMMERCIAL

## 2025-02-04 VITALS
WEIGHT: 261.91 LBS | OXYGEN SATURATION: 99 % | HEIGHT: 61 IN | SYSTOLIC BLOOD PRESSURE: 166 MMHG | TEMPERATURE: 98 F | BODY MASS INDEX: 49.45 KG/M2 | RESPIRATION RATE: 14 BRPM | HEART RATE: 106 BPM | DIASTOLIC BLOOD PRESSURE: 112 MMHG

## 2025-02-04 DIAGNOSIS — G43.109 MIGRAINE WITH AURA AND WITHOUT STATUS MIGRAINOSUS, NOT INTRACTABLE: ICD-10-CM

## 2025-02-04 DIAGNOSIS — I10 HYPERTENSION, UNSPECIFIED TYPE: ICD-10-CM

## 2025-02-04 DIAGNOSIS — M25.50 MULTIPLE JOINT PAIN: ICD-10-CM

## 2025-02-04 DIAGNOSIS — E66.01 CLASS 3 SEVERE OBESITY DUE TO EXCESS CALORIES IN ADULT, UNSPECIFIED BMI, UNSPECIFIED WHETHER SERIOUS COMORBIDITY PRESENT (HCC): ICD-10-CM

## 2025-02-04 DIAGNOSIS — E66.813 CLASS 3 SEVERE OBESITY DUE TO EXCESS CALORIES IN ADULT, UNSPECIFIED BMI, UNSPECIFIED WHETHER SERIOUS COMORBIDITY PRESENT (HCC): ICD-10-CM

## 2025-02-04 PROBLEM — G43.909 MIGRAINES: Status: RESOLVED | Noted: 2021-09-11 | Resolved: 2025-02-04

## 2025-02-04 PROCEDURE — 3077F SYST BP >= 140 MM HG: CPT | Performed by: PHYSICIAN ASSISTANT

## 2025-02-04 PROCEDURE — 3080F DIAST BP >= 90 MM HG: CPT | Performed by: PHYSICIAN ASSISTANT

## 2025-02-04 PROCEDURE — 99214 OFFICE O/P EST MOD 30 MIN: CPT | Performed by: PHYSICIAN ASSISTANT

## 2025-02-04 RX ORDER — METOPROLOL SUCCINATE 25 MG/1
25 TABLET, EXTENDED RELEASE ORAL DAILY
Qty: 90 TABLET | Refills: 1 | Status: SHIPPED | OUTPATIENT
Start: 2025-02-04

## 2025-02-04 RX ORDER — IRBESARTAN 300 MG/1
300 TABLET ORAL NIGHTLY
Qty: 100 TABLET | Refills: 3 | Status: SHIPPED | OUTPATIENT
Start: 2025-02-04 | End: 2026-03-11

## 2025-02-04 ASSESSMENT — FIBROSIS 4 INDEX: FIB4 SCORE: 0.76

## 2025-02-04 NOTE — PROGRESS NOTES
cc:  hypertension    Subjective:     Guilherme Flaherty is a 39 y.o. female presenting for hypertension        History of Present Illness  The patient is a 39-year-old female who presents to the office today for blood pressure.    She reports that her diastolic blood pressure has been increasing while her systolic pressure has been decreasing. She has been monitoring her blood pressure at night, with readings consistently around 185 to 195 over approximately 120 at home when she checks around 8:30 at night. She takes her medication in the morning. She has observed swelling in her ankles and discoloration of her toes when seated for extended periods, which she attributes to her elevated blood pressure. She has discontinued monitoring her blood pressure due to bruising on her arm. She also reports tenderness in her axillary and cervical lymph nodes, along with joint pain, particularly in her elbows, wrists, and ankles. She has not yet received the results of her cortisol test. She has not scheduled an appointment with endocrinology. Despite the non-cancerous nature of her adrenal white mass and normal blood work, she continues to experience symptoms. She underwent ACL repair in 09/2022 and has been experiencing leg swelling since then. Recently, she has noticed swelling in her right leg, hands, and arms, which she believes is related to her enlarged lymph nodes and joint pain. She is frustrated with her persistent inflammation and elevated blood pressure. She reports that her medication is slightly effective in reducing her blood pressure, but her ankle swelling remains severe. She is considering discontinuing her medication due to these side effects. She recalls experiencing a severe migraine the night she started her medication, which persisted for 5 days. She was taking Nurtec daily to manage her migraines. She has noticed a decrease in the frequency of her migraines at night, but she reports waking up multiple  times with a racing heart. She is concerned about the potential side effects of other medications. She has not tried verapamil for her headaches. She has previously taken Maxalt and sumatriptan, but these caused her to feel drowsy. She has also tried amitriptyline and Nurtec, but experienced severe injection site reactions. She has been receiving Botox injections every 3 months, which have been effective in reducing her daily headaches and migraines. She is scheduled to see her neurologist at the end of the month for her next Botox injection. She is also scheduled to see her allergist and pulmonologist for a breathing test. There has been discussion about starting Dupixent, but she is hesitant to try new medications until her inflammation is under control. She is considering a ketogenic diet or reducing her sugar intake to help with her inflammation. She reports that her pulse rate is frequently over 100. She is also considering a Mediterranean diet. She reports that her boots no longer fit over her legs due to the swelling.    MEDICATIONS  Current: Amlodipine, Nurtec, amitriptyline, Botox.  Past: Maxalt, sumatriptan.       Review of systems:  See above.   Denies any symptoms unless previously indicated.        Current Outpatient Medications:     irbesartan (AVAPRO) 300 MG Tab, Take 1 Tablet by mouth every evening., Disp: 100 Tablet, Rfl: 3    metoprolol SR (TOPROL XL) 25 MG TABLET SR 24 HR, Take 1 Tablet by mouth every day., Disp: 90 Tablet, Rfl: 1    Turmeric 5-1000 MG Cap, Take  by mouth every day., Disp: , Rfl:     ASHWAGANDHA PO, Take 1,000 mg by mouth every day., Disp: , Rfl:     budesonide (PULMICORT) 0.5 MG/2ML Suspension, Take  by nebulization., Disp: , Rfl:     Rimegepant Sulfate (NURTEC) 75 MG TABLET DISPERSIBLE, Take 1 Tablet by mouth 1 time a day as needed (migraine)., Disp: 8 Tablet, Rfl: 11    amitriptyline (ELAVIL) 25 MG Tab, Take 1 Tablet by mouth every evening for 360 days., Disp: 90 Tablet, Rfl:  "3    valACYclovir (VALTREX) 500 MG Tab, Take 1 Tablet by mouth every day., Disp: 40 Tablet, Rfl: 0    fexofenadine (ALLEGRA) 180 MG tablet, Take 180 mg by mouth every day., Disp: , Rfl:     Fluticasone Furoate-Vilanterol (BREO) 100-25 MCG/INH AEROSOL POWDER, BREATH ACTIVATED, Inhale 1 Puff every day., Disp: , Rfl:     aspirin (ASA) 325 MG Tab, Take 325 mg by mouth 2 times a day., Disp: , Rfl:     montelukast (SINGULAIR) 10 MG Tab, Take 10 mg by mouth every day., Disp: , Rfl:     albuterol 108 (90 Base) MCG/ACT Aero Soln inhalation aerosol, Inhale 2 Puffs every 6 hours as needed for Shortness of Breath., Disp: , Rfl:     Current Facility-Administered Medications:     onabotulinum toxin A (Botox) injection 155 Units, 155 Units, Injection, EVERY 12 WEEKS, , 155 Units at 11/26/24 1427    Allergies, past medical history, past surgical history, family history, social history reviewed and updated    Objective:     Vitals: BP (!) 166/112   Pulse (!) 106   Temp 36.7 °C (98 °F) (Temporal)   Resp 14   Ht 1.549 m (5' 1\")   Wt 119 kg (261 lb 14.5 oz)   SpO2 99%   BMI 49.49 kg/m²   General: Alert, pleasant, NAD  EYES:   PERRL, EOMI, no icterus or pallor.  Conjunctivae and lids normal.   HENT:  Normocephalic.  External ears normal.   Neck supple.     Respiratory: Normal respiratory effort.    Abdomen: Not obese  Skin: Warm, dry, no rashes.  Musculoskeletal: Gait is normal.  Moves all extremities well.    Extremities: normal range of motion all extremities.   Neurological: No tremors, sensation grossly intact, CN2-12 intact.  Psych:  Affect/mood is normal, judgement is good, memory is intact, grooming is appropriate.      Assessment/Plan:     Guilherme was seen today for hypertension follow-up.    Diagnoses and all orders for this visit:    Hypertension, unspecified type  -     irbesartan (AVAPRO) 300 MG Tab; Take 1 Tablet by mouth every evening.  -     metoprolol SR (TOPROL XL) 25 MG TABLET SR 24 HR; Take 1 Tablet by mouth " every day.    Class 3 severe obesity due to excess calories in adult, unspecified BMI, unspecified whether serious comorbidity present ([facility])  -     metoprolol SR (TOPROL XL) 25 MG TABLET SR 24 HR; Take 1 Tablet by mouth every day.  -     Referral to Pulmonary and Sleep Medicine    Migraine with aura and without status migrainosus, not intractable    Multiple joint pain    Body mass index 45.0-49.9, adult ([facility])        Assessment & Plan  1. Hypertension.  Her blood pressure readings have been consistently elevated, with home measurements around 185-195/120 mmHg. She reports ankle swelling and purple discoloration of her toes, which may be side effects of amlodipine. Amlodipine will be discontinued, and she will be switched to irbesartan 300 mg. She is advised to monitor her blood pressure every other day. A low dose of metoprolol will be added on day 15 if she tolerates irbesartan well. A referral to endocrinology has been made to further investigate her symptoms. A referral to pulmonary and sleep medicine has also been initiated to rule out sleep apnea as a contributing factor. She is encouraged to adopt a Mediterranean diet to potentially reduce inflammation and improve overall health.    2. Migraines.  She reports experiencing migraines, which have been managed with Botox injections every three months, amitriptyline at night, and Nurtec as needed. She has not been tried on verapamil for her headaches. She will continue her current migraine management regimen. The potential use of verapamil was discussed but not recommended at this time due to its similarity to amlodipine.    3. Joint pain and inflammation/obesity.  She reports persistent joint pain and inflammation, particularly in her elbows, wrists, and ankles. This may be related to her elevated blood pressure and potential sleep apnea. She is advised to follow up with endocrinology for further evaluation. The adoption of a Mediterranean diet is recommended to  help reduce inflammation.    Follow-up  The patient will follow up in 4 weeks.    Return in about 4 weeks (around 3/4/2025), or if symptoms worsen or fail to improve.    Please note that this dictation was created using voice recognition software. I have made every reasonable attempt to correct obvious errors, but expect that there are errors of grammar and possible content that I did not discover before finalizing note.

## 2025-02-06 LAB — CORTIS SAL-MCNC: 0.04 UG/DL

## 2025-02-18 ENCOUNTER — OFFICE VISIT (OUTPATIENT)
Dept: NEUROLOGY | Facility: MEDICAL CENTER | Age: 40
End: 2025-02-18
Attending: PSYCHIATRY & NEUROLOGY
Payer: COMMERCIAL

## 2025-02-18 VITALS
HEART RATE: 107 BPM | HEIGHT: 61 IN | DIASTOLIC BLOOD PRESSURE: 80 MMHG | RESPIRATION RATE: 18 BRPM | BODY MASS INDEX: 49.45 KG/M2 | SYSTOLIC BLOOD PRESSURE: 124 MMHG | OXYGEN SATURATION: 97 % | TEMPERATURE: 98 F | WEIGHT: 261.91 LBS

## 2025-02-18 DIAGNOSIS — G43.E09 CHRONIC MIGRAINE WITH AURA WITHOUT STATUS MIGRAINOSUS, NOT INTRACTABLE: ICD-10-CM

## 2025-02-18 PROCEDURE — 700101 HCHG RX REV CODE 250

## 2025-02-18 PROCEDURE — 64615 CHEMODENERV MUSC MIGRAINE: CPT | Performed by: PSYCHIATRY & NEUROLOGY

## 2025-02-18 PROCEDURE — 3079F DIAST BP 80-89 MM HG: CPT | Performed by: PSYCHIATRY & NEUROLOGY

## 2025-02-18 PROCEDURE — 3074F SYST BP LT 130 MM HG: CPT | Performed by: PSYCHIATRY & NEUROLOGY

## 2025-02-18 PROCEDURE — 700111 HCHG RX REV CODE 636 W/ 250 OVERRIDE (IP): Mod: JZ

## 2025-02-18 RX ADMIN — SODIUM CHLORIDE 155 UNITS: 9 INJECTION INTRAMUSCULAR; INTRAVENOUS; SUBCUTANEOUS at 13:41

## 2025-02-18 ASSESSMENT — PATIENT HEALTH QUESTIONNAIRE - PHQ9: CLINICAL INTERPRETATION OF PHQ2 SCORE: 0

## 2025-02-18 ASSESSMENT — FIBROSIS 4 INDEX: FIB4 SCORE: 0.76

## 2025-02-25 DIAGNOSIS — G43.109 MIGRAINE WITH AURA AND WITHOUT STATUS MIGRAINOSUS, NOT INTRACTABLE: ICD-10-CM

## 2025-02-25 PROCEDURE — RXMED WILLOW AMBULATORY MEDICATION CHARGE: Performed by: PSYCHIATRY & NEUROLOGY

## 2025-02-25 NOTE — TELEPHONE ENCOUNTER
Received request via: Pharmacy    Medication Name/Dosage Amitriptyline 25mg    When was medication last prescribed 2/2/24    How many refills were previously provided 3    How many Refills does he patient have left from last prescription 0    Was the patient seen in the last year in this department? Yes   Date of last office visit 2/18/25     Per last Neurology Office Visit, when was the date of next follow up visit set for?                            Date of office visit follow up request 5/18/25     Does the patient have an upcoming appointment? Yes   If yes, when 5/13/25             If no, schedule appointment -    Does the patient have correction Plus and need 100 day supply (blood pressure, diabetes and cholesterol meds only)? Patient does not have SCP

## 2025-02-26 ENCOUNTER — PHARMACY VISIT (OUTPATIENT)
Dept: PHARMACY | Facility: MEDICAL CENTER | Age: 40
End: 2025-02-26
Payer: COMMERCIAL

## 2025-03-12 ENCOUNTER — TELEPHONE (OUTPATIENT)
Dept: ENDOCRINOLOGY | Facility: MEDICAL CENTER | Age: 40
End: 2025-03-12
Payer: COMMERCIAL

## 2025-03-13 ENCOUNTER — APPOINTMENT (OUTPATIENT)
Dept: ENDOCRINOLOGY | Facility: MEDICAL CENTER | Age: 40
End: 2025-03-13
Payer: COMMERCIAL

## 2025-03-17 NOTE — PROGRESS NOTES
New Patient Consult Note for Endocrinology  Referred by: Cheyenne Freed P.A.-C.    Reason for consult:   Adrenal incidentaloma  Concerns of secondary HTN    HPI:  Guilherme Flaherty is a 39 y.o. female who was referred to endocrinology service for evaluation of L adrenal incidentaloma, difficult to control HTN.     L adrenal incidentaloma:  - first found on CT abdomen with contrast done of LLQ pain, lesion described as a 1 cm nodule with contrast density of 54 HU  - on repeat imaging with abdominal MRI - 1.1 x 0.8 cm lesion demonstrated classic signal dropout consistent with high lipid content,  benign adrenal adenoma.  - patient has class 3 obesity, HTN, denies fat redistribution, striae, DM,  hypokalemia, CVA, episodes of headache, pallor, anxiety, palpitations  - work up for hyperaldosteronism came back negative    HTN:  - started abruptly 12/2024 with BP up to 219/118  mmHg   - admits having multiple family members with HTN  - has obesity class 3  - denies snoring, but admits having sinus polyps  - work up for hyperaldosteronism came back negative    Lymphadenopathy:  - was accidentally found mesenteric lymphadenopathy, as well as cervical and axially lymphadenopathy  - cervical LN FNA in 1/10/25 - ruled out cancer and lymphoma    Past Medical History:  Patient Active Problem List    Diagnosis Date Noted    Body mass index 45.0-49.9, adult (HCC) 02/04/2025    Adrenal nodule (HCC) 01/16/2025    Atelectasis 01/16/2025    Swollen lymph nodes 11/11/2024    Multiple joint pain 11/11/2024    Hyperglycemia 11/11/2024    Neck swelling 09/03/2024    Family history of thyroid disorder 09/03/2024    Enlarged thyroid 09/03/2024    Elevated LDL cholesterol level 09/03/2024    Weight gain 09/03/2024    Elevated blood pressure reading 09/03/2024    Hemorrhoids 02/24/2024    Anemia 02/24/2024    Complete tear of right ACL, initial encounter 09/07/2022    Left ACL tear 08/25/2022    High blood pressure 10/04/2021    History of  high blood pressure 10/04/2021    Aspirin-exacerbated respiratory disease (AERD) 09/12/2021    Acute hypoxemic respiratory failure due to COVID-19 ([facility]) 09/11/2021    HSV infection 09/11/2021    Pneumonia due to COVID-19 virus 09/07/2021    Class 3 severe obesity in adult ([facility]) 09/07/2021    Asthma 09/07/2021    Abnormal weight gain 09/16/2009    Hair loss 09/16/2009    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    Dermatitis 09/16/2009    Allergic rhinitis 09/16/2009    Insomnia 09/16/2009    Family history of other chronic respiratory conditions 09/16/2009    Open wound of anterior abdominal wall 09/16/2009    Other malaise and fatigue 09/16/2009    Depressive disorder, not elsewhere classified 09/16/2009    Migraine 09/16/2009    Other atopic dermatitis and related conditions 09/16/2009     Past Surgical History:  Past Surgical History:   Procedure Laterality Date    PB KNEE SCOPE,AID ANT CRUCIATE REPAIR Left 9/7/2022    Procedure: LEFT KNEE ARTHROSCOPY ANTERIOR CRUCIATE LIGAMENT RECONSTRUCTION WITH HAMSTRING AUTOGRAFT, REPAIRS AS INDICATED;  Surgeon: Marsha Arteaga M.D.;  Location: [facility];  Service: Orthopedics    SINUS WASHING Bilateral 2/18/2021    Procedure: IRRIGATION, PARANASAL SINUS - INTRAOP POSTOP SINUS DEBRIDEMENT;  Surgeon: Joao Luque M.D.;  Location: SURGERY SAME DAY [facility];  Service: Ent    NY REPAIR OF NASAL SEPTUM Bilateral 2/11/2021    Procedure: SEPTOPLASTY, NOSE;  Surgeon: Joao Luque M.D.;  Location: SURGERY SAME DAY [facility];  Service: Ent    STEREOTACTIC COMPUTER ASSISTED PROCEDURE CRANIAL, EXTRADURAL Bilateral 2/11/2021    Procedure: STEREOTACTIC COMPUTER ASSISTED PROCEDURE CRANIAL,EXTRADURAL-NAVIGATIONAL;  Surgeon: Joao Luque M.D.;  Location: SURGERY SAME DAY [facility];  Service: Ent    ENDOSCOPY, PARANASAL SINUS, WITH TOTAL ETHMOIDECTOMY, SN Bilateral 2/11/2021    Procedure: ENDOSCOPY, PARANASAL SINUS, WITH TOTAL  ETHMOIDECTOMY, SPHENOIDOTOMY, MAXILLARY ANTROSTOMY, SPHEN+MAX SIN TISS REM, AND EXPLORATION FRONT SIN;  Surgeon: Joao Luque M.D.;  Location: SURGERY SAME DAY [facility];  Service: Ent    OTHER  2014    Sinus    ABDOMINOPLASTY  8/10/2012    Performed by SRINIVAS GOMEZ at SURGERY [facility] ORS    LIPOSUCTION  8/10/2012    Performed by SRINIVAS GOMEZ at SURGERY [facility] ORS    LESION EXCISION GENERAL  8/10/2012    Performed by SRINIVAS GOMEZ at SURGERY SURGICAL [facility] ORS    OTHER ABDOMINAL SURGERY  2012    Tummy tuck.    ABDOMINAL HYSTERECTOMY TOTAL  08    Performed by EMILIE CASTRO at SURGERY SAME DAY [facility] ORS    GYN SURGERY  2008    Parial hysterectom    GYN SURGERY  2004        GYN SURGERY  2003        GYN SURGERY  2000        PRIMARY C SECTION  x 3    TUBAL COAGULATION LAPAROSCOPIC BILATERAL       Allergies:  Aspirin, Ibuprofen, Sulfa drugs, Latex, and Tape    Social History:  Social History     Socioeconomic History    Marital status:      Spouse name: Not on file    Number of children: Not on file    Years of education: Not on file    Highest education level: GED or equivalent   Occupational History    Not on file   Tobacco Use    Smoking status: Never     Passive exposure: Never    Smokeless tobacco: Never   Vaping Use    Vaping status: Never Used   Substance and Sexual Activity    Alcohol use: Not Currently     Comment: 4 per year    Drug use: Never    Sexual activity: Not on file   Other Topics Concern    Not on file   Social History Narrative    Not on file     Social Drivers of Health     Financial Resource Strain: Low Risk  (10/4/2021)    Overall Financial Resource Strain (CARDIA)     Difficulty of Paying Living Expenses: Not hard at all   Food Insecurity: No Food Insecurity (10/4/2021)    Hunger Vital Sign     Worried About Running Out of Food in the Last Year: Never true     Ran Out of Food in the Last Year: Never true    Transportation Needs: No Transportation Needs (10/4/2021)    PRAPARE - Transportation     Lack of Transportation (Medical): No     Lack of Transportation (Non-Medical): No   Physical Activity: Inactive (10/4/2021)    Exercise Vital Sign     Days of Exercise per Week: 0 days     Minutes of Exercise per Session: 0 min   Stress: No Stress Concern Present (10/4/2021)    [employer] - Occupational Stress Questionnaire     Feeling of Stress : Not at all   Social Connections: Moderately Isolated (10/4/2021)    Social Connection and Isolation Panel [NHANES]     Frequency of Communication with Friends and Family: More than three times a week     Frequency of Social Gatherings with Friends and Family: More than three times a week     Attends Mu-ism Services: Never     Active Member of Clubs or Organizations: No     Attends Club or Organization Meetings: Never     Marital Status:    Intimate Partner Violence: Not on file   Housing Stability: Low Risk  (10/4/2021)    Housing Stability Vital Sign     Unable to Pay for Housing in the Last Year: No     Number of Places Lived in the Last Year: 1     Unstable Housing in the Last Year: No     Family History:  Family History   Problem Relation Age of Onset    Other Mother         medical drug toxicity: warfarin, methadone    Thyroid Mother     Hypertension Father         stronf family hx. on dad's side of HTN    Cancer Father         stomach    Thyroid Sister     Thyroid Maternal Aunt     Thyroid Maternal Grandmother      Medications:  Current Outpatient Medications:     amitriptyline (ELAVIL) 25 MG Tab, Take 1 Tablet by mouth every evening, Disp: 90 Tablet, Rfl: 3    irbesartan (AVAPRO) 300 MG Tab, Take 1 Tablet by mouth every evening., Disp: 100 Tablet, Rfl: 3    metoprolol SR (TOPROL XL) 25 MG TABLET SR 24 HR, Take 1 Tablet by mouth every day., Disp: 90 Tablet, Rfl: 1    Turmeric 5-1000 MG Cap, Take  by mouth every day., Disp: , Rfl:      "budesonide (PULMICORT) 0.5 MG/2ML Suspension, Take  by nebulization., Disp: , Rfl:     Rimegepant Sulfate (NURTEC) 75 MG TABLET DISPERSIBLE, Take 1 Tablet by mouth 1 time a day as needed (migraine)., Disp: 8 Tablet, Rfl: 11    valACYclovir (VALTREX) 500 MG Tab, Take 1 Tablet by mouth every day., Disp: 40 Tablet, Rfl: 0    fexofenadine (ALLEGRA) 180 MG tablet, Take 180 mg by mouth every day., Disp: , Rfl:     Fluticasone Furoate-Vilanterol (BREO) 100-25 MCG/INH AEROSOL POWDER, BREATH ACTIVATED, Inhale 1 Puff every day., Disp: , Rfl:     aspirin (ASA) 325 MG Tab, Take 325 mg by mouth 2 times a day., Disp: , Rfl:     montelukast (SINGULAIR) 10 MG Tab, Take 10 mg by mouth every day., Disp: , Rfl:     albuterol 108 (90 Base) MCG/ACT Aero Soln inhalation aerosol, Inhale 2 Puffs every 6 hours as needed for Shortness of Breath., Disp: , Rfl:     Current Facility-Administered Medications:     onabotulinum toxin A (Botox) injection 155 Units, 155 Units, Injection, EVERY 12 WEEKS, , 155 Units at 02/18/25 1341    Physical Examination:   Vital signs: /70   Pulse (!) 109   Ht 1.549 m (5' 1\") Comment: pt stated  Wt 120 kg (263 lb 9.6 oz)   SpO2 96%   BMI 49.81 kg/m²   General: No distress, cooperative, well dressed and well nourished.   Eyes: No scleral icterus or discharge  ENMT: Normal on external inspection of nose, lips, No nasal drainage   Neck: No abnormal masses on inspection  Resp: Normal effort  CVS: Regular rate and rhythm  Extremities: No edema bilateral extremities  Neuro: Alert and oriented  Skin: No rash, No Ulcers  Psych: Normal mood and affect    Labs:  - reviewed   Reference Range  01/31/25 23:00   Cortisol, Saliva ug/dL 0.045      Reference Range  01/16/25    Acth 7.2 - 63.3 pg/mL 17.8      Reference Range  01/16/25    Dopamine <=240 pmol/L 133   Epinepherine <=330 pmol/L <55   Norepinepherine 1050 - 4800 pmol/L 3425      Reference Range  12/02/24 01/16/25    Sodium 135 - 145 mmol/L 142    Potassium " 3.6 - 5.5 mmol/L 4.0    Aldos Serum ng/dL  13.9   Renin Activity ng/mL/hr  2.0     Imaging:  - reviewed  CT abdomen with contrast on 12/2/24:  HISTORY/REASON FOR EXAM:  LLQ pain.   COMPARISON: No prior studies available.  FINDINGS:  Lower Chest: Linear densities of the bilateral lung bases are seen favoring changes of atelectasis.  Liver: Normal.  Spleen: Unremarkable.  Pancreas: Unremarkable.  Gallbladder: No calcified stones.  Biliary: Nondilated.  Adrenal glands: 10 mm left adrenal nodule is seen measuring 51 Hounsfield units.  Kidneys: Unremarkable without hydronephrosis.  Bowel: No obstruction or acute inflammation. The appendix appears within normal limits.  Lymph nodes: Enlarged mesenteric lymph nodes are seen measuring up to 3 mm in short axis  Vasculature: Unremarkable.  Peritoneum: Unremarkable without ascites.  Musculoskeletal: No acute or destructive process.  Pelvis: No adenopathy or free fluid. Postsurgical changes of hysterectomy are seen.  IMPRESSION:  1.  Small left adrenal nodule, indeterminate, follow-up adrenal protocol CT for further characterization as clinically appropriate  2.  Bilateral enlarged mesenteric lymph nodes, consider changes of mesenteric adenitis or other causes of adenopathy with additional workup as clinically appropriate    MR abdomen on 1/16/25:  HISTORY/REASON FOR EXAM:  Evaluation of adrenal lesion seen on the recent CT  COMPARISON: 12/2/2024.  FINDINGS:  Lower chest: Lung bases are clear.  Liver: Normal hepatic contour. No mass. No intrahepatic biliary dilatation.  Gallbladder: No mural thickening. No gallstones.  Common bile duct: Nondilated.  Pancreas: Unremarkable.  Spleen: No mass.  Adrenals: A left adrenal nodule measures 1.1 x 0.8 cm on MRI. It demonstrates classic signal dropout of benign adrenal adenoma. No suspicious adrenal lesions.  Kidneys: No suspicious mass lesions. Tiny left renal cyst, which does not require imaging follow-up. No hydronephrosis.  Stomach,  small bowel, colon: No bowel wall thickening or obstruction.  Peritoneal cavity: No ascites.  Lymph nodes: No enlarged nodes by size criteria. Prominent mesenteric nodes which were better seen on the recent CT scan may represent sequela of mesenteric panniculitis.  Aorta: No aneurysm.  Musculoskeletal structures: Preserved bone marrow signal.  Benign dominant follicle in the left ovary.  IMPRESSION:  1.  Left adrenal nodule represents a benign adrenal adenoma. It does not require follow-up.  2.  Prominent mesenteric nodes which were better seen on the recent CT scan may represent sequela of mesenteric panniculitis.    Assessment and Plan:  # L adrenal adenoma  - initially  noted on CT of the abdomen, I do not concerned about HU density as it was done WITH CONTRAST  - abdominal MRI redemonstrated L adrenal lesion 1.1 cm  with classic signal dropout consistent with high lipid content => benign adrenal adenoma -> reassured the patient  - patient has class 3 obesity, HTN, denies fat redistribution, striae, DM,  hypokalemia, CVA, episodes of headache, pallor, anxiety, palpitations  - work up for hyperaldosteronism came back negative  - she had only 1 LNSC level checked which is normal but not formally enough to rule out Cushing's syndrome, will proceed with most sensitive test for Cushing's syndrome - DST  - even though the suspicion for pheochromocytoma is very low, E, NE, and DA are not very sensitive tests for it. Additionally, I am unsure of the specific threshold for when to stop screening for pheochromocytoma if there is no data on noncontrast HU. It would be prudent to proceed with checking metanephrines.  Plan:  Reassured the patient that her adrenal lesion is benign given MRI characteristics.   Will rule out Cushing's syndrome and pheochromocytoma, obtain following labs:   - METANEPHRINES, FRAC., PL. FREE  - CORTISOL; Future  - Miscellaneous Test; Future  - dexamethasone (DECADRON) 1 MG Tab; Take 1 Tablet by  mouth one time as needed (TAKE AT MIDNIGHT FOR TEST) for up to 1 dose.  Dispense: 1 Tablet; Refill: 0    # Hypertension, unspecified type  - concerns of secondary hypertension given early  and abrupt onset with BP elevation up to 219/118 mmHg  - obesity class 3 is contributory, notably patient has Fhx of HTN in a multiple family members  - no evidence of hyperaldosteronism  - will rule out Cushing's syndrome, pheochromocytoma, hyperthyroidism  - recommended further work up for SCOTT, renal artery disease  Plan:  Will rule out hyperthyroidism, Cushing's syndrome, pheochromocytoma  - FREE THYROXINE; Future  - TSH; Future  2. If endocrine work up is negative -> further evaluation by PCP for other possible secondary causes of HTN.     # Obesity class 3  - will rule out Cushing's syndrome, T2DM, hypothyroidism  - further discussion about management on upcoming visits  - HEMOGLOBIN A1C; Future    # Generalized lymphadenopathy  - FNA of cervical LN rule out cancer and lymphoma  - consider possible infections,including HIV,  autoimmune diseases such as SLE, RA, sarcoidosis  Plan:  Further work up as per PCP.     RTC: 4 weeks    Total time (face-to-face and non-face-to face time):  60 min -  extensive discussion of diagnoses, treatment, prognosis, medical charts, lab, imaging, pathology review, documentation.    Plan reviewed with the patient and agreed with plan.  All questions answered to patient's satisfaction.  Thank you kindly for allowing me to participate in the care plan for this patient.    Griselda Cohen MD    CC:   Cheyenne Freed P.A.-C.

## 2025-03-20 ENCOUNTER — OFFICE VISIT (OUTPATIENT)
Dept: ENDOCRINOLOGY | Facility: MEDICAL CENTER | Age: 40
End: 2025-03-20
Attending: STUDENT IN AN ORGANIZED HEALTH CARE EDUCATION/TRAINING PROGRAM
Payer: COMMERCIAL

## 2025-03-20 VITALS
WEIGHT: 263.6 LBS | DIASTOLIC BLOOD PRESSURE: 70 MMHG | HEART RATE: 109 BPM | OXYGEN SATURATION: 96 % | HEIGHT: 61 IN | SYSTOLIC BLOOD PRESSURE: 112 MMHG | BODY MASS INDEX: 49.77 KG/M2

## 2025-03-20 DIAGNOSIS — D35.02 ADENOMA OF LEFT ADRENAL GLAND: ICD-10-CM

## 2025-03-20 DIAGNOSIS — I10 HYPERTENSION, UNSPECIFIED TYPE: ICD-10-CM

## 2025-03-20 PROCEDURE — 99211 OFF/OP EST MAY X REQ PHY/QHP: CPT | Performed by: STUDENT IN AN ORGANIZED HEALTH CARE EDUCATION/TRAINING PROGRAM

## 2025-03-20 RX ORDER — DEXAMETHASONE 1 MG
1 TABLET ORAL
Qty: 1 TABLET | Refills: 0 | Status: SHIPPED | OUTPATIENT
Start: 2025-03-20

## 2025-03-20 ASSESSMENT — FIBROSIS 4 INDEX: FIB4 SCORE: 0.76

## 2025-03-25 PROCEDURE — RXMED WILLOW AMBULATORY MEDICATION CHARGE: Performed by: PSYCHIATRY & NEUROLOGY

## 2025-03-26 ENCOUNTER — PHARMACY VISIT (OUTPATIENT)
Dept: PHARMACY | Facility: MEDICAL CENTER | Age: 40
End: 2025-03-26
Payer: COMMERCIAL

## 2025-03-31 ENCOUNTER — OFFICE VISIT (OUTPATIENT)
Dept: MEDICAL GROUP | Facility: CLINIC | Age: 40
End: 2025-03-31
Payer: COMMERCIAL

## 2025-03-31 VITALS
RESPIRATION RATE: 18 BRPM | HEIGHT: 62 IN | WEIGHT: 263.89 LBS | BODY MASS INDEX: 48.56 KG/M2 | SYSTOLIC BLOOD PRESSURE: 118 MMHG | OXYGEN SATURATION: 97 % | DIASTOLIC BLOOD PRESSURE: 78 MMHG | TEMPERATURE: 97.8 F | HEART RATE: 103 BPM

## 2025-03-31 DIAGNOSIS — S31.109D OPEN WOUND OF ANTERIOR ABDOMINAL WALL, SUBSEQUENT ENCOUNTER: ICD-10-CM

## 2025-03-31 DIAGNOSIS — E66.01 MORBID OBESITY WITH BMI OF 45.0-49.9, ADULT (HCC): ICD-10-CM

## 2025-03-31 DIAGNOSIS — I10 PRIMARY HYPERTENSION: ICD-10-CM

## 2025-03-31 DIAGNOSIS — R63.5 WEIGHT GAIN: ICD-10-CM

## 2025-03-31 DIAGNOSIS — E27.9 ADRENAL NODULE (HCC): ICD-10-CM

## 2025-03-31 DIAGNOSIS — R59.9 SWOLLEN LYMPH NODES: ICD-10-CM

## 2025-03-31 RX ORDER — FLUCONAZOLE 150 MG/1
TABLET ORAL
Qty: 2 TABLET | Refills: 0 | Status: SHIPPED | OUTPATIENT
Start: 2025-03-31

## 2025-03-31 ASSESSMENT — FIBROSIS 4 INDEX: FIB4 SCORE: 0.76

## 2025-03-31 NOTE — PROGRESS NOTES
cc:  hypertension    Subjective:     Guilherme Flaherty is a 39 y.o. female presenting for hypertension        History of Present Illness  The patient is a 39-year-old female who presents to the office today for a follow-up on hypertension.    She reports no adverse effects from Avapro and notes a decrease in her blood pressure. Her blood pressure was recorded as 112/78 during her endocrinologist visit. She experiences occasional lightheadedness, which she attributes to her medication regimen. She takes all her medications at night, including amitriptyline, which induces grogginess the following day. She is currently on metoprolol exclusively for blood pressure management.    She has been experiencing recurrent episodes of foul-smelling drainage from her umbilicus, which she manages by maintaining cleanliness. These episodes are unpredictable, occurring monthly for several months or with no occurrence for extended periods. The most recent episode occurred between November 2024 and December 2024, characterized by inflammation and redness of the umbilicus. She also experiences abdominal pain during these episodes. She has been advised to lose weight and discontinue steroids for 18 months before surgical intervention can be considered. She has been prescribed Augmentin for sinus infections. She has not observed any yeast infections around the umbilicus but maintains dryness and cleanliness. She has been using Neosporin for skin irritation. She has a history of abdominoplasty in 2012, followed by pneumonia three days post-surgery, which resulted in torn stitches and a hematoma seroma that required drainage every few days for 28 days. Her body rejected the drainage tube on the 12th day. She was informed of an infected suture line in her abdomen, which could only be resolved through surgical intervention. However, due to her intermittent steroid use, she was advised to remain steroid-free for 18 months before surgery  could be considered. She underwent one course of antibiotics, but the issue persists.    She has been experiencing weight gain over the past few months, particularly in the upper body, and has been advised to undergo a sleep test to investigate potential triggers for her hypertension. She has been experiencing lymph node swelling, which she suspects may be infection-related.    She has been advised to undergo additional blood work by her endocrinologist. She has been diagnosed with an abnormal adrenal gland lesion, which is not believed to be cancerous due to its fat content. However, there is concern about potential hormone production. She has not yet completed the required blood work.    Supplemental Information  She saw the allergist after her last visit here for her breathing test and allergies, and he sent her out for a lot of blood work because some of the numbers were not right, but he has not called or messaged her about it.    MEDICATIONS  Current: Avapro, metoprolol, amitriptyline       Review of systems:  See above.   Denies any symptoms unless previously indicated.        Current Outpatient Medications:     amoxicillin-clavulanate (AUGMENTIN) 875-125 MG Tab, Take 1 Tablet by mouth 2 times a day., Disp: 20 Tablet, Rfl: 0    fluconazole (DIFLUCAN) 150 MG tablet, Take one tab weekly for 2 weeks., Disp: 2 Tablet, Rfl: 0    dexamethasone (DECADRON) 1 MG Tab, Take 1 Tablet by mouth one time as needed (TAKE AT MIDNIGHT FOR TEST) for up to 1 dose., Disp: 1 Tablet, Rfl: 0    amitriptyline (ELAVIL) 25 MG Tab, Take 1 Tablet by mouth every evening, Disp: 90 Tablet, Rfl: 3    irbesartan (AVAPRO) 300 MG Tab, Take 1 Tablet by mouth every evening., Disp: 100 Tablet, Rfl: 3    metoprolol SR (TOPROL XL) 25 MG TABLET SR 24 HR, Take 1 Tablet by mouth every day., Disp: 90 Tablet, Rfl: 1    budesonide (PULMICORT) 0.5 MG/2ML Suspension, Take  by nebulization., Disp: , Rfl:     Rimegepant Sulfate (NURTEC) 75 MG TABLET  "DISPERSIBLE, Take 1 Tablet by mouth 1 time a day as needed (migraine)., Disp: 8 Tablet, Rfl: 11    fexofenadine (ALLEGRA) 180 MG tablet, Take 180 mg by mouth every day., Disp: , Rfl:     Fluticasone Furoate-Vilanterol (BREO) 100-25 MCG/INH AEROSOL POWDER, BREATH ACTIVATED, Inhale 1 Puff every day., Disp: , Rfl:     aspirin (ASA) 325 MG Tab, Take 325 mg by mouth 2 times a day. (Patient taking differently: Take 325 mg by mouth. 1 time a day), Disp: , Rfl:     montelukast (SINGULAIR) 10 MG Tab, Take 10 mg by mouth every day., Disp: , Rfl:     albuterol 108 (90 Base) MCG/ACT Aero Soln inhalation aerosol, Inhale 2 Puffs every 6 hours as needed for Shortness of Breath., Disp: , Rfl:     Turmeric 5-1000 MG Cap, Take  by mouth every day., Disp: , Rfl:     valACYclovir (VALTREX) 500 MG Tab, Take 1 Tablet by mouth every day. (Patient not taking: Reported on 3/31/2025), Disp: 40 Tablet, Rfl: 0    Current Facility-Administered Medications:     onabotulinum toxin A (Botox) injection 155 Units, 155 Units, Injection, EVERY 12 WEEKS, , 155 Units at 02/18/25 1341    Allergies, past medical history, past surgical history, family history, social history reviewed and updated    Objective:     Vitals: /78 (BP Location: Left arm, Patient Position: Sitting, BP Cuff Size: Large adult)   Pulse (!) 103   Temp 36.6 °C (97.8 °F) (Temporal)   Resp 18   Ht 1.562 m (5' 1.5\")   Wt 120 kg (263 lb 14.3 oz)   SpO2 97%   BMI 49.05 kg/m²   General: Alert, pleasant, NAD  EYES:   PERRL, EOMI, no icterus or pallor.  Conjunctivae and lids normal.   HENT:  Normocephalic.  External ears normal.   Neck supple.    Respiratory: Normal respiratory effort.    Abdomen: obese  Skin: Warm, dry, no rashes.  Musculoskeletal: Gait is normal.  Moves all extremities well.    Extremities: normal range of motion all extremities.    Neurological: No tremors, sensation grossly intact,  CN2-12 intact.  Psych:  Affect/mood is normal, judgement is good, memory is " intact, grooming is appropriate.    Physical Exam  Vital Signs  Blood pressure was 112/78.       Results  Imaging  Ultrasound showed a lesion on the adrenal gland with fat in it.       Assessment/Plan:     Guilherme was seen today for hypertension.    Diagnoses and all orders for this visit:    Primary hypertension    Open wound of anterior abdominal wall, subsequent encounter  -     amoxicillin-clavulanate (AUGMENTIN) 875-125 MG Tab; Take 1 Tablet by mouth 2 times a day.  -     fluconazole (DIFLUCAN) 150 MG tablet; Take one tab weekly for 2 weeks.    Swollen lymph nodes    Weight gain    Adrenal nodule (HCC)        Assessment & Plan  1. Hypertension.  Her blood pressure readings have shown improvement today. The current regimen of metoprolol and irbesartan will be maintained until further evaluation. She has been advised to halve the dosage of metoprolol, taking half a pill daily. If symptoms of dizziness or lightheadedness persist, she should reduce the frequency to every other day for 1 to 2 weeks before discontinuing the medication. She has been instructed to contact the office immediately if any complications arise.    2. Wound anterior abdominal wall/swollen lymph nodes.  The infection could potentially explain her elevated pulse rate as her body is constantly combating it. A prescription for Augmentin has been provided for use in case of infection flare-ups. Additionally, a prescription for Diflucan, to be taken once weekly for two weeks, has been given to manage potential yeast infections. She has been advised to inform the office if the infection worsens or if the Augmentin proves ineffective.    3. Adrenal nodule.  The endocrinologist is concerned about the lesion on her adrenal gland, which may be contributing to her symptoms. She has been advised to complete the DEXA test and follow up with the endocrinologist for further evaluation.    4. Weight gain/obesity.  She has been experiencing weight gain over  the past few months, particularly in the upper body, and has been advised to undergo a sleep test to investigate potential triggers for her hypertension.    Follow-up  The patient will follow up in 6 weeks.    PROCEDURE  The patient had an abdominoplasty in 2012.    Return in about 6 weeks (around 5/12/2025), or if symptoms worsen or fail to improve, for 6-8 weeks hypertension.    Please note that this dictation was created using voice recognition software. I have made every reasonable attempt to correct obvious errors, but expect that there are errors of grammar and possible content that I did not discover before finalizing note.

## 2025-04-04 ENCOUNTER — HOSPITAL ENCOUNTER (OUTPATIENT)
Dept: LAB | Facility: MEDICAL CENTER | Age: 40
End: 2025-04-04
Attending: STUDENT IN AN ORGANIZED HEALTH CARE EDUCATION/TRAINING PROGRAM
Payer: COMMERCIAL

## 2025-04-04 DIAGNOSIS — D35.02 ADENOMA OF LEFT ADRENAL GLAND: ICD-10-CM

## 2025-04-04 DIAGNOSIS — I10 HYPERTENSION, UNSPECIFIED TYPE: ICD-10-CM

## 2025-04-04 LAB
CORTIS SERPL-MCNC: 0.6 UG/DL (ref 0–23)
EST. AVERAGE GLUCOSE BLD GHB EST-MCNC: 143 MG/DL
HBA1C MFR BLD: 6.6 % (ref 4–5.6)
T4 FREE SERPL-MCNC: 1.13 NG/DL (ref 0.93–1.7)
TSH SERPL-ACNC: 1.32 UIU/ML (ref 0.38–5.33)

## 2025-04-04 PROCEDURE — 84439 ASSAY OF FREE THYROXINE: CPT

## 2025-04-04 PROCEDURE — 36415 COLL VENOUS BLD VENIPUNCTURE: CPT

## 2025-04-04 PROCEDURE — 83036 HEMOGLOBIN GLYCOSYLATED A1C: CPT

## 2025-04-04 PROCEDURE — 84443 ASSAY THYROID STIM HORMONE: CPT

## 2025-04-04 PROCEDURE — 83835 ASSAY OF METANEPHRINES: CPT

## 2025-04-04 PROCEDURE — 82533 TOTAL CORTISOL: CPT

## 2025-04-04 PROCEDURE — 80299 QUANTITATIVE ASSAY DRUG: CPT

## 2025-04-08 LAB
METANEPHS SERPL-SCNC: <0.1 NMOL/L (ref 0–0.49)
NORMETANEPHRINE SERPL-SCNC: 0.52 NMOL/L (ref 0–0.89)
TEST NAME 95000: NORMAL

## 2025-04-29 PROCEDURE — RXMED WILLOW AMBULATORY MEDICATION CHARGE: Performed by: PSYCHIATRY & NEUROLOGY

## 2025-05-05 ENCOUNTER — PHARMACY VISIT (OUTPATIENT)
Dept: PHARMACY | Facility: MEDICAL CENTER | Age: 40
End: 2025-05-05
Payer: COMMERCIAL

## 2025-05-08 ENCOUNTER — TELEPHONE (OUTPATIENT)
Dept: ENDOCRINOLOGY | Facility: MEDICAL CENTER | Age: 40
End: 2025-05-08
Payer: COMMERCIAL

## 2025-05-08 NOTE — TELEPHONE ENCOUNTER
Left a detailed message informing pt that Dr. Cohen is out sick on 5/9 and we r/s their appointment to May 13 @ 2:00pm. Requested a call back if needed to r/s

## 2025-05-09 ENCOUNTER — APPOINTMENT (OUTPATIENT)
Dept: ENDOCRINOLOGY | Facility: MEDICAL CENTER | Age: 40
End: 2025-05-09
Attending: STUDENT IN AN ORGANIZED HEALTH CARE EDUCATION/TRAINING PROGRAM
Payer: COMMERCIAL

## 2025-05-11 NOTE — PROGRESS NOTES
Follow up Endocrinology Visit  Initial consult/last visit on: 3/20/25  Referred by: Cheyenne Freed P.A.-C.    Chief complaint:  Guilherme Flaherty, 39 y.o., female, who is here for follow up for adrenal incidentaloma, secondary HTN evaluation and management.     Interval history:   -  - reviewed recent labs    L adrenal incidentaloma:  - first found on CT abdomen with contrast done of LLQ pain, lesion described as a 1 cm nodule with contrast density of 54 HU  - on repeat imaging with abdominal MRI - 1.1 x 0.8 cm lesion demonstrated classic signal dropout consistent with high lipid content,  benign adrenal adenoma.  - patient has class 3 obesity, HTN, denies fat redistribution, striae, DM,  hypokalemia, CVA, episodes of headache, pallor, anxiety, palpitations  - work up for hyperaldosteronism came back negative    HTN:  - started abruptly 12/2024 with BP up to 219/118  mmHg   - admits having multiple family members with HTN  - has obesity class 3  - denies snoring, but admits having sinus polyps  - work up for hyperaldosteronism came back negative    Lymphadenopathy:  - was accidentally found mesenteric lymphadenopathy, as well as cervical and axially lymphadenopathy  - cervical LN FNA in 1/10/25 - ruled out cancer and lymphoma    Medications:  Current Outpatient Medications:     amoxicillin-clavulanate (AUGMENTIN) 875-125 MG Tab, Take 1 Tablet by mouth 2 times a day., Disp: 20 Tablet, Rfl: 0    fluconazole (DIFLUCAN) 150 MG tablet, Take one tab weekly for 2 weeks., Disp: 2 Tablet, Rfl: 0    dexamethasone (DECADRON) 1 MG Tab, Take 1 Tablet by mouth one time as needed (TAKE AT MIDNIGHT FOR TEST) for up to 1 dose., Disp: 1 Tablet, Rfl: 0    amitriptyline (ELAVIL) 25 MG Tab, Take 1 Tablet by mouth every evening, Disp: 90 Tablet, Rfl: 3    irbesartan (AVAPRO) 300 MG Tab, Take 1 Tablet by mouth every evening., Disp: 100 Tablet, Rfl: 3    metoprolol SR (TOPROL XL) 25 MG TABLET SR 24 HR, Take 1 Tablet by mouth every day.,  Disp: 90 Tablet, Rfl: 1    budesonide (PULMICORT) 0.5 MG/2ML Suspension, Take  by nebulization., Disp: , Rfl:     Rimegepant Sulfate (NURTEC) 75 MG TABLET DISPERSIBLE, Take 1 Tablet by mouth 1 time a day as needed (migraine)., Disp: 8 Tablet, Rfl: 11    valACYclovir (VALTREX) 500 MG Tab, Take 1 Tablet by mouth every day. (Patient not taking: Reported on 3/31/2025), Disp: 40 Tablet, Rfl: 0    fexofenadine (ALLEGRA) 180 MG tablet, Take 180 mg by mouth every day., Disp: , Rfl:     Fluticasone Furoate-Vilanterol (BREO) 100-25 MCG/INH AEROSOL POWDER, BREATH ACTIVATED, Inhale 1 Puff every day., Disp: , Rfl:     aspirin (ASA) 325 MG Tab, Take 325 mg by mouth 2 times a day. (Patient taking differently: Take 325 mg by mouth. 1 time a day), Disp: , Rfl:     montelukast (SINGULAIR) 10 MG Tab, Take 10 mg by mouth every day., Disp: , Rfl:     albuterol 108 (90 Base) MCG/ACT Aero Soln inhalation aerosol, Inhale 2 Puffs every 6 hours as needed for Shortness of Breath., Disp: , Rfl:     Current Facility-Administered Medications:     onabotulinum toxin A (Botox) injection 155 Units, 155 Units, Injection, EVERY 12 WEEKS, , 155 Units at 02/18/25 1341    Physical Examination:   Vital signs: There were no vitals taken for this visit.  General: No distress, cooperative, well dressed and well nourished.   Eyes: No scleral icterus or discharge  ENMT: Normal on external inspection of nose, lips, No nasal drainage   Neck: No abnormal masses on inspection  Resp: Normal effort  CVS: Regular rate and rhythm  Extremities: No edema bilateral extremities  Neuro: Alert and oriented  Skin: No rash, No Ulcers  Psych: Normal mood and affect    Labs:  - reviewed  Cushing's syndrome work up:   Reference Range  01/31/25 23:00   Cortisol, Saliva ug/dL 0.045      Reference Range  01/16/25    Acth 7.2 - 63.3 pg/mL 17.8     DST:    Reference Range  04/04/25 08:08   Cortisol < 1.8 ug/dL 0.6   Dexamethasone level < 140  409.5     Pheochromocytoma work up:    Reference Range 04/04/25    Metanephrine Plasma 0.00 - 0.49 nmol/L <0.10   Normetanephrine Plasma 0.00 - 0.89 nmol/L 0.52      Reference Range  01/16/25    Dopamine <=240 pmol/L 133   Epinepherine <=330 pmol/L <55   Norepinepherine 1050 - 4800 pmol/L 3425     Hyperaldosteronism work up:   Reference Range  12/02/24 01/16/25    Sodium 135 - 145 mmol/L 142    Potassium 3.6 - 5.5 mmol/L 4.0    Aldos Serum ng/dL  13.9   Renin Activity ng/mL/hr  2.0     Hypothyroidism work up:   Reference Range 04/04/25    TSH 0.380 - 5.330 uIU/mL 1.320   fT4 0.93 - 1.70 ng/dL 1.13     Diabetes screening:   Reference Range  04/04/25    HbA1C 4.0 - 5.6 % 6.6 (H)     Imaging:  - reviewed  CT abdomen with contrast on 12/2/24:  HISTORY/REASON FOR EXAM:  LLQ pain.   COMPARISON: No prior studies available.  FINDINGS:  Lower Chest: Linear densities of the bilateral lung bases are seen favoring changes of atelectasis.  Liver: Normal.  Spleen: Unremarkable.  Pancreas: Unremarkable.  Gallbladder: No calcified stones.  Biliary: Nondilated.  Adrenal glands: 10 mm left adrenal nodule is seen measuring 51 Hounsfield units.  Kidneys: Unremarkable without hydronephrosis.  Bowel: No obstruction or acute inflammation. The appendix appears within normal limits.  Lymph nodes: Enlarged mesenteric lymph nodes are seen measuring up to 3 mm in short axis  Vasculature: Unremarkable.  Peritoneum: Unremarkable without ascites.  Musculoskeletal: No acute or destructive process.  Pelvis: No adenopathy or free fluid. Postsurgical changes of hysterectomy are seen.  IMPRESSION:  1.  Small left adrenal nodule, indeterminate, follow-up adrenal protocol CT for further characterization as clinically appropriate  2.  Bilateral enlarged mesenteric lymph nodes, consider changes of mesenteric adenitis or other causes of adenopathy with additional workup as clinically appropriate    MR abdomen on 1/16/25:  HISTORY/REASON FOR EXAM:  Evaluation of adrenal lesion seen on the  recent CT  COMPARISON: 12/2/2024.  FINDINGS:  Lower chest: Lung bases are clear.  Liver: Normal hepatic contour. No mass. No intrahepatic biliary dilatation.  Gallbladder: No mural thickening. No gallstones.  Common bile duct: Nondilated.  Pancreas: Unremarkable.  Spleen: No mass.  Adrenals: A left adrenal nodule measures 1.1 x 0.8 cm on MRI. It demonstrates classic signal dropout of benign adrenal adenoma. No suspicious adrenal lesions.  Kidneys: No suspicious mass lesions. Tiny left renal cyst, which does not require imaging follow-up. No hydronephrosis.  Stomach, small bowel, colon: No bowel wall thickening or obstruction.  Peritoneal cavity: No ascites.  Lymph nodes: No enlarged nodes by size criteria. Prominent mesenteric nodes which were better seen on the recent CT scan may represent sequela of mesenteric panniculitis.  Aorta: No aneurysm.  Musculoskeletal structures: Preserved bone marrow signal.  Benign dominant follicle in the left ovary.  IMPRESSION:  1.  Left adrenal nodule represents a benign adrenal adenoma. It does not require follow-up.  2.  Prominent mesenteric nodes which were better seen on the recent CT scan may represent sequela of mesenteric panniculitis.    Assessment and Plan:  # L adrenal adenoma  -   Prior notes:  3/20/25  - initially  noted on CT of the abdomen, I do not concerned about HU density as it was done WITH CONTRAST  - abdominal MRI redemonstrated L adrenal lesion 1.1 cm  with classic signal dropout consistent with high lipid content => benign adrenal adenoma -> reassured the patient  - patient has class 3 obesity, HTN, denies fat redistribution, striae, DM,  hypokalemia, CVA, episodes of headache, pallor, anxiety, palpitations  - work up for hyperaldosteronism came back negative  - she had only 1 LNSC level checked which is normal but not formally enough to rule out Cushing's syndrome, will proceed with most sensitive test for Cushing's syndrome - DST  - even though the  suspicion for pheochromocytoma is very low, E, NE, and DA are not very sensitive tests for it. Additionally, I am unsure of the specific threshold for when to stop screening for pheochromocytoma if there is no data on noncontrast HU. It would be prudent to proceed with checking metanephrines.  Plan:  Reassured the patient that her adrenal lesion is benign given MRI characteristics.   Will rule out Cushing's syndrome and pheochromocytoma, obtain following labs:   - METANEPHRINES, FRAC., PL. FREE  - CORTISOL; Future  - Miscellaneous Test; Future  - dexamethasone (DECADRON) 1 MG Tab; Take 1 Tablet by mouth one time as needed (TAKE AT MIDNIGHT FOR TEST) for up to 1 dose.  Dispense: 1 Tablet; Refill: 0    # Hypertension, unspecified type  -   Prior notes:  3/20/25  - concerns of secondary hypertension given early  and abrupt onset with BP elevation up to 219/118 mmHg  - obesity class 3 is contributory, notably patient has Fhx of HTN in a multiple family members  - no evidence of hyperaldosteronism  - will rule out Cushing's syndrome, pheochromocytoma, hyperthyroidism  - recommended further work up for SCOTT, renal artery disease  Plan:  Will rule out hyperthyroidism, Cushing's syndrome, pheochromocytoma  - FREE THYROXINE; Future  - TSH; Future  2. If endocrine work up is negative -> further evaluation by PCP for other possible secondary causes of HTN.     # Obesity class 3  -   Prior notes:  3/20/25  - will rule out Cushing's syndrome, T2DM, hypothyroidism  - further discussion about management on upcoming visits  - HEMOGLOBIN A1C; Future    # Generalized lymphadenopathy  -   Prior notes:  3/20/25  - FNA of cervical LN rule out cancer and lymphoma  - consider possible infections,including HIV,  autoimmune diseases such as SLE, RA, sarcoidosis  Plan:  Further work up as per PCP.     RTC: 4 weeks    Total time (face-to-face and non-face-to face time):  60 min -  extensive discussion of diagnoses, treatment, prognosis,  medical charts, lab, imaging, pathology review, documentation.    Plan reviewed with the patient and agreed with plan.  All questions answered to patient's satisfaction.  Thank you kindly for allowing me to participate in the care plan for this patient.    Griselda Cohen MD    CC:   Cheyenne Freed P.A.-C.

## 2025-05-13 ENCOUNTER — APPOINTMENT (OUTPATIENT)
Dept: NEUROLOGY | Facility: MEDICAL CENTER | Age: 40
End: 2025-05-13
Attending: PSYCHIATRY & NEUROLOGY
Payer: COMMERCIAL

## 2025-05-13 ENCOUNTER — APPOINTMENT (OUTPATIENT)
Dept: ENDOCRINOLOGY | Facility: MEDICAL CENTER | Age: 40
End: 2025-05-13
Attending: STUDENT IN AN ORGANIZED HEALTH CARE EDUCATION/TRAINING PROGRAM
Payer: COMMERCIAL

## 2025-05-14 ENCOUNTER — OFFICE VISIT (OUTPATIENT)
Dept: MEDICAL GROUP | Facility: CLINIC | Age: 40
End: 2025-05-14
Payer: COMMERCIAL

## 2025-05-14 VITALS
SYSTOLIC BLOOD PRESSURE: 124 MMHG | OXYGEN SATURATION: 97 % | TEMPERATURE: 97.6 F | BODY MASS INDEX: 50.36 KG/M2 | HEART RATE: 102 BPM | RESPIRATION RATE: 18 BRPM | DIASTOLIC BLOOD PRESSURE: 82 MMHG | WEIGHT: 266.76 LBS | HEIGHT: 61 IN

## 2025-05-14 DIAGNOSIS — R42 DIZZINESS: ICD-10-CM

## 2025-05-14 DIAGNOSIS — I10 PRIMARY HYPERTENSION: ICD-10-CM

## 2025-05-14 DIAGNOSIS — J01.41 ACUTE RECURRENT PANSINUSITIS: ICD-10-CM

## 2025-05-14 DIAGNOSIS — R63.5 WEIGHT GAIN: ICD-10-CM

## 2025-05-14 DIAGNOSIS — J33.9 NASAL POLYPS: ICD-10-CM

## 2025-05-14 PROBLEM — J32.4 PANSINUSITIS: Status: ACTIVE | Noted: 2025-05-14

## 2025-05-14 PROCEDURE — 3074F SYST BP LT 130 MM HG: CPT | Performed by: PHYSICIAN ASSISTANT

## 2025-05-14 PROCEDURE — 99214 OFFICE O/P EST MOD 30 MIN: CPT | Performed by: PHYSICIAN ASSISTANT

## 2025-05-14 PROCEDURE — 3079F DIAST BP 80-89 MM HG: CPT | Performed by: PHYSICIAN ASSISTANT

## 2025-05-14 ASSESSMENT — FIBROSIS 4 INDEX: FIB4 SCORE: 0.76

## 2025-05-14 NOTE — PROGRESS NOTES
cc:  hypertension    Subjective:     Guilherme Flaherty is a 39 y.o. female presenting for hypertension        History of Present Illness  The patient is a 39-year-old female who presents to the office today to follow up on hypertension, dizziness, sinus issues, and weight gain.    She reports a general sense of well-being, although she has not been monitoring her blood pressure at home. During her last visit, she experienced episodes of dizziness, which she initially attributed to her antihypertensive medication. However, upon checking her blood pressure, she found it to be elevated at 160/105. She describes the sensation as akin to intoxication, with difficulty maintaining balance and a feeling of impending collapse. These episodes can last from 5 to 10 minutes, with the longest recorded duration being an hour. Despite these symptoms, she has not reduced her medication dosage due to the absence of hypotension. She plans to increase her home blood pressure monitoring frequency. She also mentions that the blood pressure cuff felt unusually tight during her last episode.    She has been experiencing weight gain and feels unable to maintain her current weight. She has an upcoming appointment with her endocrinologist in 2 months. Recent lab results indicate an A1c of 6.6, suggesting a possible diabetic condition. Additionally, her cortisol levels appear low, while dexamethasone levels are high, indicating a potential adrenal gland issue. She has a history of frequent steroid use, which she believes may have contributed to her weight gain. In 2014, she underwent a course of high-dose prednisone for 10 to 12 days, followed by a Kenalog injection and another round of prednisone after 4 weeks. This treatment resulted in significant weight gain. She underwent surgery 9 months later, but her polyps recurred. She was then desensitized to aspirin by an allergist, who also managed her asthma before performing another  "surgery. She is now 4 to 5 years post-surgery and continues to have polyps, although they are less severe than before. This is the first time she has been unable to clear her sinuses during an allergy flare-up. She is considering starting Dupixent but wants to resolve her blood pressure issues first.    She has a history of sinus issues and sees an ENT specialist every 6 to 9 months for polyp checks. Her last visit was in January or February 2025. She experienced an allergy flare-up last week, which has caused significant sinus congestion due to her polyps' inability to drain. She has been using budesonide and saline solution twice daily without relief. Her usual treatment includes Z-Chris and prednisone, but she has not required any medication for this condition in the past 4 years. She is not allergic to penicillin. She was on prednisone for her lungs last year.    PAST SURGICAL HISTORY:  - Sinus surgery for polyps (2014)  - Sinus surgery post-aspirin desensitization (approximately 4-5 years ago)       Review of systems:  See above.   Denies any symptoms unless previously indicated.      Current Medications[1]    Allergies, past medical history, past surgical history, family history, social history reviewed and updated    Objective:     Vitals: /82 (BP Location: Left arm, Patient Position: Sitting, BP Cuff Size: Large adult)   Pulse (!) 102   Temp 36.4 °C (97.6 °F) (Temporal)   Resp 18   Ht 1.549 m (5' 1\")   Wt 121 kg (266 lb 12.1 oz)   SpO2 97%   BMI 50.40 kg/m²   General: Alert, pleasant, NAD  EYES:   PERRL, EOMI, no icterus or pallor.  Conjunctivae and lids normal.   HENT:  Normocephalic.  External ears normal.   Neck supple.    Respiratory: Normal respiratory effort.    Abdomen: obese  Skin: Warm, dry, no rashes.  Musculoskeletal: Gait is normal.  Moves all extremities well.    Extremities: normal range of motion all extremities.   Neurological: No tremors, sensation grossly intact,  CN2-12 " intact.  Psych:  Affect/mood is normal, judgement is good, memory is intact, grooming is appropriate.    Results  Labs   - A1c: 6.6   - Cortisol: Low   - Thyroid testing: Normal   - Metanephrines: Normal   - Catecholamines: Normal   - Dexamethasone: High       Assessment/Plan:     Guilherme was seen today for hypertension.    Diagnoses and all orders for this visit:    Primary hypertension    Dizziness    Nasal polyps    Weight gain    Acute recurrent pansinusitis  -     amoxicillin-clavulanate (AUGMENTIN) 875-125 MG Tab; Take 1 Tablet by mouth 2 times a day.        Assessment & Plan  1. Hypertension.  - Blood pressure readings have been within the normal range during office visits.  - The tightness of the blood pressure cuff could have potentially led to an overestimation of her blood pressure values.  - The possibility of vertigo contributing to her dizziness was considered, but it is more likely that her elevated blood pressure is the primary cause.  - She will maintain her current antihypertensive regimen.    2. Dizziness.  - The dizziness could be related to her elevated blood pressure or a potential adrenal gland issue.  - The patient will monitor her blood pressure more frequently at home.  - If the dizziness persists, further evaluation will be necessary.  - follow up to endocrinology is recommended to assess potential adrenal gland issues.    3. Sinusitis.  - Reports significant sinus congestion and difficulty with drainage due to polyps.  - Use of steroids is being avoided due to potential adrenal issues.  - Prescription for Augmentin 875 mg twice daily for 10 days has been provided, with a refill available if necessary.  - Advised to ensure she has a scheduled appointment with her ENT specialist.  -hold on steroids if possible due to potential adrenal issue.    4. Weight gain.  - Weight gain could be attributed to potential cortisol dysregulation.  - Advised to follow up with her endocrinologist to address  this issue.  - Reviewed lab results indicating low cortisol and high dexamethasone levels, suggesting possible adrenal gland involvement.  - Further assessment by endocrinology is necessary.    Follow-up  The patient will follow up in 4 to 6 weeks, or earlier if necessary.    Return in about 6 weeks (around 6/25/2025), or if symptoms worsen or fail to improve, for 4-6 weeks.  .    Please note that this dictation was created using voice recognition software. I have made every reasonable attempt to correct obvious errors, but expect that there are errors of grammar and possible content that I did not discover before finalizing note.               [1]   Current Outpatient Medications:     amoxicillin-clavulanate (AUGMENTIN) 875-125 MG Tab, Take 1 Tablet by mouth 2 times a day., Disp: 20 Tablet, Rfl: 1    fluconazole (DIFLUCAN) 150 MG tablet, Take one tab weekly for 2 weeks., Disp: 2 Tablet, Rfl: 0    amitriptyline (ELAVIL) 25 MG Tab, Take 1 Tablet by mouth every evening, Disp: 90 Tablet, Rfl: 3    irbesartan (AVAPRO) 300 MG Tab, Take 1 Tablet by mouth every evening., Disp: 100 Tablet, Rfl: 3    metoprolol SR (TOPROL XL) 25 MG TABLET SR 24 HR, Take 1 Tablet by mouth every day., Disp: 90 Tablet, Rfl: 1    budesonide (PULMICORT) 0.5 MG/2ML Suspension, Take  by nebulization., Disp: , Rfl:     Rimegepant Sulfate (NURTEC) 75 MG TABLET DISPERSIBLE, Take 1 Tablet by mouth 1 time a day as needed (migraine)., Disp: 8 Tablet, Rfl: 11    fexofenadine (ALLEGRA) 180 MG tablet, Take 180 mg by mouth every day., Disp: , Rfl:     Fluticasone Furoate-Vilanterol (BREO) 100-25 MCG/INH AEROSOL POWDER, BREATH ACTIVATED, Inhale 1 Puff every day., Disp: , Rfl:     aspirin (ASA) 325 MG Tab, Take 325 mg by mouth 2 times a day. (Patient taking differently: Take 325 mg by mouth. 1 time a day), Disp: , Rfl:     montelukast (SINGULAIR) 10 MG Tab, Take 10 mg by mouth every day., Disp: , Rfl:     albuterol 108 (90 Base) MCG/ACT Aero Soln inhalation  aerosol, Inhale 2 Puffs every 6 hours as needed for Shortness of Breath., Disp: , Rfl:     dexamethasone (DECADRON) 1 MG Tab, Take 1 Tablet by mouth one time as needed (TAKE AT MIDNIGHT FOR TEST) for up to 1 dose. (Patient not taking: Reported on 5/14/2025), Disp: 1 Tablet, Rfl: 0    valACYclovir (VALTREX) 500 MG Tab, Take 1 Tablet by mouth every day. (Patient not taking: Reported on 5/14/2025), Disp: 40 Tablet, Rfl: 0    Current Facility-Administered Medications:     onabotulinum toxin A (Botox) injection 155 Units, 155 Units, Injection, EVERY 12 WEEKS, , 155 Units at 02/18/25 1341

## 2025-05-19 ENCOUNTER — APPOINTMENT (OUTPATIENT)
Dept: NEUROLOGY | Facility: MEDICAL CENTER | Age: 40
End: 2025-05-19
Attending: PSYCHIATRY & NEUROLOGY
Payer: COMMERCIAL

## 2025-05-22 ENCOUNTER — OFFICE VISIT (OUTPATIENT)
Dept: NEUROLOGY | Facility: MEDICAL CENTER | Age: 40
End: 2025-05-22
Attending: PSYCHIATRY & NEUROLOGY
Payer: COMMERCIAL

## 2025-05-22 VITALS
WEIGHT: 265.65 LBS | OXYGEN SATURATION: 96 % | HEART RATE: 113 BPM | SYSTOLIC BLOOD PRESSURE: 142 MMHG | TEMPERATURE: 98.4 F | DIASTOLIC BLOOD PRESSURE: 90 MMHG | BODY MASS INDEX: 48.89 KG/M2 | HEIGHT: 62 IN | RESPIRATION RATE: 20 BRPM

## 2025-05-22 DIAGNOSIS — G43.E09 CHRONIC MIGRAINE WITH AURA WITHOUT STATUS MIGRAINOSUS, NOT INTRACTABLE: Primary | ICD-10-CM

## 2025-05-22 PROCEDURE — RXMED WILLOW AMBULATORY MEDICATION CHARGE: Performed by: PSYCHIATRY & NEUROLOGY

## 2025-05-22 PROCEDURE — 700111 HCHG RX REV CODE 636 W/ 250 OVERRIDE (IP)

## 2025-05-22 PROCEDURE — 64615 CHEMODENERV MUSC MIGRAINE: CPT | Performed by: PSYCHIATRY & NEUROLOGY

## 2025-05-22 PROCEDURE — 3077F SYST BP >= 140 MM HG: CPT | Performed by: PSYCHIATRY & NEUROLOGY

## 2025-05-22 PROCEDURE — 64615 CHEMODENERV MUSC MIGRAINE: CPT

## 2025-05-22 PROCEDURE — 3080F DIAST BP >= 90 MM HG: CPT | Performed by: PSYCHIATRY & NEUROLOGY

## 2025-05-22 RX ADMIN — ONABOTULINUMTOXINA 155 UNITS: 200 INJECTION, POWDER, LYOPHILIZED, FOR SOLUTION INTRADERMAL; INTRAMUSCULAR at 13:57

## 2025-05-22 ASSESSMENT — FIBROSIS 4 INDEX: FIB4 SCORE: 0.76

## 2025-05-22 ASSESSMENT — PATIENT HEALTH QUESTIONNAIRE - PHQ9: CLINICAL INTERPRETATION OF PHQ2 SCORE: 0

## 2025-05-23 ENCOUNTER — APPOINTMENT (OUTPATIENT)
Dept: NEUROLOGY | Facility: MEDICAL CENTER | Age: 40
End: 2025-05-23
Attending: PSYCHIATRY & NEUROLOGY
Payer: COMMERCIAL

## 2025-05-23 ENCOUNTER — PHARMACY VISIT (OUTPATIENT)
Dept: PHARMACY | Facility: MEDICAL CENTER | Age: 40
End: 2025-05-23
Payer: COMMERCIAL

## 2025-05-25 NOTE — PROGRESS NOTES
Follow up Endocrinology Visit  Initial consult/last visit on: 3/20/25  Referred by: Cheyenne Freed P.A.-C.    Patient was presented for a telehealth consultation via secure and encrypted videoconferencing technology.    Location of Provider - office  Location of Patient - [place], at home  Patient Consent - yes  Platform used - Zoom    Chief complaint:  Guilherme Flaherty, 39 y.o., female, who is here for follow up for adrenal incidentaloma, secondary HTN evaluation and management.     Interval history:   -  - reviewed recent labs    L adrenal incidentaloma:  - first found on CT abdomen with contrast done of LLQ pain, lesion described as a 1 cm nodule with contrast density of 54 HU  - on repeat imaging with abdominal MRI - 1.1 x 0.8 cm lesion demonstrated classic signal dropout consistent with high lipid content,  benign adrenal adenoma.  - patient has class 3 obesity, HTN, denies fat redistribution, striae, DM,  hypokalemia, CVA, episodes of headache, pallor, anxiety, palpitations  - work up for hyperaldosteronism came back negative    HTN:  - started abruptly 12/2024 with BP up to 219/118  mmHg   - admits having multiple family members with HTN  - has obesity class 3  - denies snoring, but admits having sinus polyps  - work up for hyperaldosteronism came back negative    Lymphadenopathy:  - was accidentally found mesenteric lymphadenopathy, as well as cervical and axially lymphadenopathy  - cervical LN FNA in 1/10/25 - ruled out cancer and lymphoma    Medications:  Current Outpatient Medications:     amoxicillin-clavulanate (AUGMENTIN) 875-125 MG Tab, Take 1 Tablet by mouth 2 times a day., Disp: 20 Tablet, Rfl: 1    fluconazole (DIFLUCAN) 150 MG tablet, Take one tab weekly for 2 weeks. (Patient not taking: Reported on 5/22/2025), Disp: 2 Tablet, Rfl: 0    dexamethasone (DECADRON) 1 MG Tab, Take 1 Tablet by mouth one time as needed (TAKE AT MIDNIGHT FOR TEST) for up to 1 dose. (Patient not taking: Reported on  5/22/2025), Disp: 1 Tablet, Rfl: 0    amitriptyline (ELAVIL) 25 MG Tab, Take 1 Tablet by mouth every evening, Disp: 90 Tablet, Rfl: 3    irbesartan (AVAPRO) 300 MG Tab, Take 1 Tablet by mouth every evening., Disp: 100 Tablet, Rfl: 3    metoprolol SR (TOPROL XL) 25 MG TABLET SR 24 HR, Take 1 Tablet by mouth every day., Disp: 90 Tablet, Rfl: 1    budesonide (PULMICORT) 0.5 MG/2ML Suspension, Take  by nebulization., Disp: , Rfl:     Rimegepant Sulfate (NURTEC) 75 MG TABLET DISPERSIBLE, Take 1 Tablet by mouth 1 time a day as needed (migraine)., Disp: 8 Tablet, Rfl: 11    valACYclovir (VALTREX) 500 MG Tab, Take 1 Tablet by mouth every day. (Patient not taking: Reported on 5/22/2025), Disp: 40 Tablet, Rfl: 0    fexofenadine (ALLEGRA) 180 MG tablet, Take 180 mg by mouth every day., Disp: , Rfl:     Fluticasone Furoate-Vilanterol (BREO) 100-25 MCG/INH AEROSOL POWDER, BREATH ACTIVATED, Inhale 1 Puff every day., Disp: , Rfl:     aspirin (ASA) 325 MG Tab, Take 325 mg by mouth 2 times a day., Disp: , Rfl:     montelukast (SINGULAIR) 10 MG Tab, Take 10 mg by mouth every day., Disp: , Rfl:     albuterol 108 (90 Base) MCG/ACT Aero Soln inhalation aerosol, Inhale 2 Puffs every 6 hours as needed for Shortness of Breath., Disp: , Rfl:     Current Facility-Administered Medications:     onabotulinum toxin A (Botox) injection 155 Units, 155 Units, Injection, EVERY 12 WEEKS, , 155 Units at 05/22/25 1357    onabotulinum toxin A (Botox) injection 155 Units, 155 Units, Injection, EVERY 12 WEEKS, , 155 Units at 02/18/25 1341    Physical Examination:   Vital signs: There were no vitals taken for this visit.  General: No distress, cooperative, well dressed and well nourished.   Eyes: No scleral icterus or discharge  ENMT: Normal on external inspection of nose, lips, No nasal drainage   Neck: No abnormal masses on inspection  Resp: Normal effort  CVS: Regular rate and rhythm  Extremities: No edema bilateral extremities  Neuro: Alert and  oriented  Skin: No rash, No Ulcers  Psych: Normal mood and affect    Labs:  - reviewed  Cushing's syndrome work up:   Reference Range  01/31/25 23:00   Cortisol, Saliva ug/dL 0.045      Reference Range  01/16/25    Acth 7.2 - 63.3 pg/mL 17.8     DST:    Reference Range  04/04/25 08:08   Cortisol < 1.8 ug/dL 0.6   Dexamethasone level < 140  409.5     Pheochromocytoma work up:   Reference Range 04/04/25    Metanephrine Plasma 0.00 - 0.49 nmol/L <0.10   Normetanephrine Plasma 0.00 - 0.89 nmol/L 0.52      Reference Range  01/16/25    Dopamine <=240 pmol/L 133   Epinepherine <=330 pmol/L <55   Norepinepherine 1050 - 4800 pmol/L 3425     Hyperaldosteronism work up:   Reference Range  12/02/24 01/16/25    Sodium 135 - 145 mmol/L 142    Potassium 3.6 - 5.5 mmol/L 4.0    Aldos Serum ng/dL  13.9   Renin Activity ng/mL/hr  2.0     Hypothyroidism work up:   Reference Range 04/04/25    TSH 0.380 - 5.330 uIU/mL 1.320   fT4 0.93 - 1.70 ng/dL 1.13     Diabetes screening:   Reference Range  04/04/25    HbA1C 4.0 - 5.6 % 6.6 (H)     Imaging:  - reviewed  CT abdomen with contrast on 12/2/24:  HISTORY/REASON FOR EXAM:  LLQ pain.   COMPARISON: No prior studies available.  FINDINGS:  Lower Chest: Linear densities of the bilateral lung bases are seen favoring changes of atelectasis.  Liver: Normal.  Spleen: Unremarkable.  Pancreas: Unremarkable.  Gallbladder: No calcified stones.  Biliary: Nondilated.  Adrenal glands: 10 mm left adrenal nodule is seen measuring 51 Hounsfield units.  Kidneys: Unremarkable without hydronephrosis.  Bowel: No obstruction or acute inflammation. The appendix appears within normal limits.  Lymph nodes: Enlarged mesenteric lymph nodes are seen measuring up to 3 mm in short axis  Vasculature: Unremarkable.  Peritoneum: Unremarkable without ascites.  Musculoskeletal: No acute or destructive process.  Pelvis: No adenopathy or free fluid. Postsurgical changes of hysterectomy are seen.  IMPRESSION:  1.  Small left  adrenal nodule, indeterminate, follow-up adrenal protocol CT for further characterization as clinically appropriate  2.  Bilateral enlarged mesenteric lymph nodes, consider changes of mesenteric adenitis or other causes of adenopathy with additional workup as clinically appropriate    MR abdomen on 1/16/25:  HISTORY/REASON FOR EXAM:  Evaluation of adrenal lesion seen on the recent CT  COMPARISON: 12/2/2024.  FINDINGS:  Lower chest: Lung bases are clear.  Liver: Normal hepatic contour. No mass. No intrahepatic biliary dilatation.  Gallbladder: No mural thickening. No gallstones.  Common bile duct: Nondilated.  Pancreas: Unremarkable.  Spleen: No mass.  Adrenals: A left adrenal nodule measures 1.1 x 0.8 cm on MRI. It demonstrates classic signal dropout of benign adrenal adenoma. No suspicious adrenal lesions.  Kidneys: No suspicious mass lesions. Tiny left renal cyst, which does not require imaging follow-up. No hydronephrosis.  Stomach, small bowel, colon: No bowel wall thickening or obstruction.  Peritoneal cavity: No ascites.  Lymph nodes: No enlarged nodes by size criteria. Prominent mesenteric nodes which were better seen on the recent CT scan may represent sequela of mesenteric panniculitis.  Aorta: No aneurysm.  Musculoskeletal structures: Preserved bone marrow signal.  Benign dominant follicle in the left ovary.  IMPRESSION:  1.  Left adrenal nodule represents a benign adrenal adenoma. It does not require follow-up.  2.  Prominent mesenteric nodes which were better seen on the recent CT scan may represent sequela of mesenteric panniculitis.    Assessment and Plan:  # L adrenal adenoma  -   Prior notes:  3/20/25  - initially  noted on CT of the abdomen, I do not concerned about HU density as it was done WITH CONTRAST  - abdominal MRI redemonstrated L adrenal lesion 1.1 cm  with classic signal dropout consistent with high lipid content => benign adrenal adenoma -> reassured the patient  - patient has class 3  obesity, HTN, denies fat redistribution, striae, DM,  hypokalemia, CVA, episodes of headache, pallor, anxiety, palpitations  - work up for hyperaldosteronism came back negative  - she had only 1 LNSC level checked which is normal but not formally enough to rule out Cushing's syndrome, will proceed with most sensitive test for Cushing's syndrome - DST  - even though the suspicion for pheochromocytoma is very low, E, NE, and DA are not very sensitive tests for it. Additionally, I am unsure of the specific threshold for when to stop screening for pheochromocytoma if there is no data on noncontrast HU. It would be prudent to proceed with checking metanephrines.  Plan:  Reassured the patient that her adrenal lesion is benign given MRI characteristics.   Will rule out Cushing's syndrome and pheochromocytoma, obtain following labs:   - METANEPHRINES, FRAC., PL. FREE  - CORTISOL; Future  - Miscellaneous Test; Future  - dexamethasone (DECADRON) 1 MG Tab; Take 1 Tablet by mouth one time as needed (TAKE AT MIDNIGHT FOR TEST) for up to 1 dose.  Dispense: 1 Tablet; Refill: 0    # Hypertension, unspecified type  -   Prior notes:  3/20/25  - concerns of secondary hypertension given early  and abrupt onset with BP elevation up to 219/118 mmHg  - obesity class 3 is contributory, notably patient has Fhx of HTN in a multiple family members  - no evidence of hyperaldosteronism  - will rule out Cushing's syndrome, pheochromocytoma, hyperthyroidism  - recommended further work up for SCOTT, renal artery disease  Plan:  Will rule out hyperthyroidism, Cushing's syndrome, pheochromocytoma  - FREE THYROXINE; Future  - TSH; Future  2. If endocrine work up is negative -> further evaluation by PCP for other possible secondary causes of HTN.     # Obesity class 3  -   Prior notes:  3/20/25  - will rule out Cushing's syndrome, T2DM, hypothyroidism  - further discussion about management on upcoming visits  - HEMOGLOBIN A1C; Future    # Generalized  lymphadenopathy  -   Prior notes:  3/20/25  - FNA of cervical LN ruled out cancer and lymphoma  - consider possible infections,including HIV,  autoimmune diseases such as SLE, RA, sarcoidosis  Plan:  Further work up as per PCP.     RTC: 4 weeks    Total time (face-to-face and non-face-to face time):  60 min -  extensive discussion of diagnoses, treatment, prognosis, medical charts, lab, imaging, pathology review, documentation.    Plan reviewed with the patient and agreed with plan.  All questions answered to patient's satisfaction.  Thank you kindly for allowing me to participate in the care plan for this patient.    Griselda Cohen MD    CC:   Cheyenne Freed P.A.-C.

## 2025-05-28 ENCOUNTER — PHARMACY VISIT (OUTPATIENT)
Dept: PHARMACY | Facility: MEDICAL CENTER | Age: 40
End: 2025-05-28
Payer: COMMERCIAL

## 2025-05-28 ENCOUNTER — TELEMEDICINE (OUTPATIENT)
Dept: ENDOCRINOLOGY | Facility: MEDICAL CENTER | Age: 40
End: 2025-05-28
Attending: STUDENT IN AN ORGANIZED HEALTH CARE EDUCATION/TRAINING PROGRAM
Payer: COMMERCIAL

## 2025-05-28 VITALS — WEIGHT: 260 LBS | HEIGHT: 61 IN | BODY MASS INDEX: 49.09 KG/M2

## 2025-05-28 DIAGNOSIS — E11.65 TYPE 2 DIABETES MELLITUS WITH HYPERGLYCEMIA, WITHOUT LONG-TERM CURRENT USE OF INSULIN (HCC): Primary | ICD-10-CM

## 2025-05-28 PROCEDURE — 99214 OFFICE O/P EST MOD 30 MIN: CPT | Mod: 95 | Performed by: STUDENT IN AN ORGANIZED HEALTH CARE EDUCATION/TRAINING PROGRAM

## 2025-05-28 PROCEDURE — RXMED WILLOW AMBULATORY MEDICATION CHARGE: Performed by: STUDENT IN AN ORGANIZED HEALTH CARE EDUCATION/TRAINING PROGRAM

## 2025-05-28 RX ORDER — METFORMIN HYDROCHLORIDE 500 MG/1
500 TABLET, EXTENDED RELEASE ORAL DAILY
Qty: 100 TABLET | Refills: 3 | Status: SHIPPED | OUTPATIENT
Start: 2025-05-28 | End: 2026-07-02

## 2025-05-28 RX ORDER — TIRZEPATIDE 2.5 MG/.5ML
2.5 INJECTION, SOLUTION SUBCUTANEOUS
Qty: 2 ML | Refills: 0 | Status: SHIPPED | OUTPATIENT
Start: 2025-05-28

## 2025-05-28 RX ORDER — TIRZEPATIDE 5 MG/.5ML
5 INJECTION, SOLUTION SUBCUTANEOUS
Qty: 2 ML | Refills: 11 | Status: SHIPPED | OUTPATIENT
Start: 2025-05-28

## 2025-05-28 ASSESSMENT — FIBROSIS 4 INDEX: FIB4 SCORE: 0.78

## 2025-06-12 ENCOUNTER — TELEPHONE (OUTPATIENT)
Dept: ENDOCRINOLOGY | Facility: MEDICAL CENTER | Age: 40
End: 2025-06-12
Payer: COMMERCIAL

## 2025-06-12 ENCOUNTER — TELEPHONE (OUTPATIENT)
Dept: NEUROLOGY | Facility: MEDICAL CENTER | Age: 40
End: 2025-06-12
Payer: COMMERCIAL

## 2025-06-12 NOTE — TELEPHONE ENCOUNTER
Received New Start PA request via [facility]  for NURTEC. (Quantity:8, Day Supply:30)     Insurance: [employer] [facility]  Member ID:  [id number]  BIN: [id number]  PCN: [id number]  Group: [id number]     Ran Test claim via [place] & medication Rejects stating prior authorization is required.

## 2025-06-12 NOTE — TELEPHONE ENCOUNTER
Prior Authorization for NURTEC  (Quantity: 8, Days: 30) has been submitted via Cover My Meds: Key ([id number])    Insurance: [employer]     Will follow up in 24-48 business hours.

## 2025-06-12 NOTE — TELEPHONE ENCOUNTER
Prior Authorization for Mounjaro 5mg/0.5mL Sopn has been APPROVED  Prior Authorization reference number: [id number]  Effective dates: 6/12/25-6/12/26  Copay: $25  Is patient eligible to fill with [facility]? Yes-Already fills  Next Steps: Advised Neuro Rx Coordinator of approval so she can reach out to patient to coordinate fill and delivery.     Thank you,  Dulce James [facility]  Endocrinology Pharmacy Coordinator

## 2025-06-13 PROCEDURE — RXMED WILLOW AMBULATORY MEDICATION CHARGE: Performed by: PSYCHIATRY & NEUROLOGY

## 2025-06-13 PROCEDURE — RXMED WILLOW AMBULATORY MEDICATION CHARGE: Performed by: STUDENT IN AN ORGANIZED HEALTH CARE EDUCATION/TRAINING PROGRAM

## 2025-06-13 NOTE — TELEPHONE ENCOUNTER
"Prior Authorization for NURTEC  has been denied for a quantity of 8 , day supply 30    Prior authorization was denied per the following:   \"PT needs to try and fail 2 Triptans\"    I have answered \"Yes\" that PT HAS tried and failed 2 triptans.    Prior Authorization denial reference number: [id number] - [id number]  Insurance: DANYEL       Next Steps:Proceeding with another PA    "

## 2025-06-13 NOTE — TELEPHONE ENCOUNTER
Prior Authorization for NURTEC  (Quantity: 8, Days: 30) has been submitted via Cover My Meds: Key ([id number])    Insurance: DANYEL    Will follow up in 24-48 business hours.

## 2025-06-13 NOTE — TELEPHONE ENCOUNTER
Prior Authorization for NURTEC  has been approved for a quantity of 8 , day supply 30    Prior Authorization reference number: [id number] - [id number]  Insurance: DANYEL   Effective dates: 06/13/2025 - 06/13/2026  Copay: $0     Is patient eligible to fill with [facility]? Yes    Next Steps: Continue filling for my PT

## 2025-06-23 ENCOUNTER — PHARMACY VISIT (OUTPATIENT)
Dept: PHARMACY | Facility: MEDICAL CENTER | Age: 40
End: 2025-06-23
Payer: COMMERCIAL

## 2025-07-15 DIAGNOSIS — G43.109 MIGRAINE WITH AURA AND WITHOUT STATUS MIGRAINOSUS, NOT INTRACTABLE: ICD-10-CM

## 2025-07-15 PROCEDURE — RXMED WILLOW AMBULATORY MEDICATION CHARGE: Performed by: PSYCHIATRY & NEUROLOGY

## 2025-07-15 PROCEDURE — RXMED WILLOW AMBULATORY MEDICATION CHARGE: Performed by: STUDENT IN AN ORGANIZED HEALTH CARE EDUCATION/TRAINING PROGRAM

## 2025-07-17 PROCEDURE — RXMED WILLOW AMBULATORY MEDICATION CHARGE: Performed by: PSYCHIATRY & NEUROLOGY

## 2025-07-17 RX ORDER — RIMEGEPANT SULFATE 75 MG/75MG
75 TABLET, ORALLY DISINTEGRATING ORAL
Qty: 8 TABLET | Refills: 11 | Status: SHIPPED | OUTPATIENT
Start: 2025-07-17 | End: 2026-07-17

## 2025-07-17 NOTE — TELEPHONE ENCOUNTER
Received request via: Pharmacy    Medication Name/Dosage Nurtec 75mg    When was medication last prescribed 7/1/24    How many refills were previously provided 11    How many Refills does he patient have left from last prescription 0    Was the patient seen in the last year in this department? Yes   Date of last office visit 5/22/25     Per last Neurology Office Visit, when was the date of next follow up visit set for?                            Date of office visit follow up request 8/14/25     Does the patient have an upcoming appointment? Yes   If yes, when 8/18/25             If no, schedule appointment -    Does the patient have nursing home Plus and need 100 day supply (blood pressure, diabetes and cholesterol meds only)? Patient does not have SCP

## 2025-07-18 ENCOUNTER — PHARMACY VISIT (OUTPATIENT)
Dept: PHARMACY | Facility: MEDICAL CENTER | Age: 40
End: 2025-07-18
Payer: COMMERCIAL

## 2025-07-22 DIAGNOSIS — I10 HYPERTENSION, UNSPECIFIED TYPE: ICD-10-CM

## 2025-07-22 DIAGNOSIS — E66.813 CLASS 3 SEVERE OBESITY DUE TO EXCESS CALORIES IN ADULT, UNSPECIFIED BMI, UNSPECIFIED WHETHER SERIOUS COMORBIDITY PRESENT: ICD-10-CM

## 2025-07-22 RX ORDER — METOPROLOL SUCCINATE 25 MG/1
25 TABLET, EXTENDED RELEASE ORAL DAILY
Qty: 90 TABLET | Refills: 2 | Status: SHIPPED | OUTPATIENT
Start: 2025-07-22

## 2025-07-22 NOTE — TELEPHONE ENCOUNTER
Requested Prescriptions     Pending Prescriptions Disp Refills    metoprolol SR (TOPROL XL) 25 MG TABLET SR 24 HR [Pharmacy Med Name: Metoprolol Succinate ER 25 MG Oral Tablet Extended Release 24 Hour] 90 Tablet 0     Sig: Take 1 tablet by mouth once daily     Last office visit: 5/14/25  Last lab: 1/16/25

## 2025-07-29 ENCOUNTER — APPOINTMENT (OUTPATIENT)
Dept: NEUROLOGY | Facility: MEDICAL CENTER | Age: 40
End: 2025-07-29
Attending: PSYCHIATRY & NEUROLOGY
Payer: COMMERCIAL

## 2025-08-06 ENCOUNTER — SPECIALTY PHARMACY (OUTPATIENT)
Dept: NEUROLOGY | Facility: MEDICAL CENTER | Age: 40
End: 2025-08-06

## 2025-08-06 ENCOUNTER — APPOINTMENT (OUTPATIENT)
Dept: MEDICAL GROUP | Facility: CLINIC | Age: 40
End: 2025-08-06
Payer: COMMERCIAL

## 2025-08-11 ENCOUNTER — SPECIALTY PHARMACY (OUTPATIENT)
Dept: NEUROLOGY | Facility: MEDICAL CENTER | Age: 40
End: 2025-08-11
Payer: COMMERCIAL

## 2025-08-15 ENCOUNTER — SPECIALTY PHARMACY (OUTPATIENT)
Dept: NEUROLOGY | Facility: MEDICAL CENTER | Age: 40
End: 2025-08-15
Payer: COMMERCIAL

## 2025-08-15 PROCEDURE — RXMED WILLOW AMBULATORY MEDICATION CHARGE: Performed by: STUDENT IN AN ORGANIZED HEALTH CARE EDUCATION/TRAINING PROGRAM

## 2025-08-15 PROCEDURE — RXMED WILLOW AMBULATORY MEDICATION CHARGE: Performed by: PSYCHIATRY & NEUROLOGY

## 2025-08-18 ENCOUNTER — TELEPHONE (OUTPATIENT)
Dept: NEUROLOGY | Facility: MEDICAL CENTER | Age: 40
End: 2025-08-18

## 2025-08-18 ENCOUNTER — PHARMACY VISIT (OUTPATIENT)
Dept: PHARMACY | Facility: MEDICAL CENTER | Age: 40
End: 2025-08-18
Payer: MEDICARE

## 2025-08-18 ENCOUNTER — OFFICE VISIT (OUTPATIENT)
Dept: NEUROLOGY | Facility: MEDICAL CENTER | Age: 40
End: 2025-08-18
Attending: PSYCHIATRY & NEUROLOGY
Payer: COMMERCIAL

## 2025-08-18 VITALS
HEART RATE: 112 BPM | SYSTOLIC BLOOD PRESSURE: 112 MMHG | TEMPERATURE: 98 F | OXYGEN SATURATION: 96 % | BODY MASS INDEX: 48.24 KG/M2 | DIASTOLIC BLOOD PRESSURE: 76 MMHG | WEIGHT: 255.51 LBS | HEIGHT: 61 IN

## 2025-08-18 DIAGNOSIS — G43.E09 CHRONIC MIGRAINE WITH AURA WITHOUT STATUS MIGRAINOSUS, NOT INTRACTABLE: Primary | ICD-10-CM

## 2025-08-18 PROCEDURE — 64615 CHEMODENERV MUSC MIGRAINE: CPT | Performed by: PSYCHIATRY & NEUROLOGY

## 2025-08-18 PROCEDURE — 3074F SYST BP LT 130 MM HG: CPT | Performed by: PSYCHIATRY & NEUROLOGY

## 2025-08-18 PROCEDURE — 3078F DIAST BP <80 MM HG: CPT | Performed by: PSYCHIATRY & NEUROLOGY

## 2025-08-18 PROCEDURE — 700111 HCHG RX REV CODE 636 W/ 250 OVERRIDE (IP)

## 2025-08-18 PROCEDURE — RXMED WILLOW AMBULATORY MEDICATION CHARGE: Performed by: PSYCHIATRY & NEUROLOGY

## 2025-08-18 RX ADMIN — ONABOTULINUMTOXINA 155 UNITS: 200 INJECTION, POWDER, LYOPHILIZED, FOR SOLUTION INTRADERMAL; INTRAMUSCULAR at 15:57

## 2025-08-18 ASSESSMENT — LIFESTYLE VARIABLES
HOW OFTEN DO YOU HAVE SIX OR MORE DRINKS ON ONE OCCASION: LESS THAN MONTHLY
AUDIT-C TOTAL SCORE: 2
HOW MANY STANDARD DRINKS CONTAINING ALCOHOL DO YOU HAVE ON A TYPICAL DAY: 1 OR 2
HOW OFTEN DO YOU HAVE A DRINK CONTAINING ALCOHOL: MONTHLY OR LESS
SKIP TO QUESTIONS 9-10: 0

## 2025-08-18 ASSESSMENT — FIBROSIS 4 INDEX: FIB4 SCORE: 0.78

## 2025-08-18 ASSESSMENT — PATIENT HEALTH QUESTIONNAIRE - PHQ9: CLINICAL INTERPRETATION OF PHQ2 SCORE: 0

## 2025-08-20 ENCOUNTER — APPOINTMENT (OUTPATIENT)
Dept: MEDICAL GROUP | Facility: CLINIC | Age: 40
End: 2025-08-20
Payer: COMMERCIAL

## 2025-08-28 ENCOUNTER — APPOINTMENT (OUTPATIENT)
Dept: ENDOCRINOLOGY | Facility: MEDICAL CENTER | Age: 40
End: 2025-08-28
Attending: STUDENT IN AN ORGANIZED HEALTH CARE EDUCATION/TRAINING PROGRAM
Payer: COMMERCIAL

## (undated) DEVICE — BLADE ROTATABLE STRAIGHT 13CM

## (undated) DEVICE — BOVIE FOOT CONTROL SUCTION - 6IN 10FR (25EA/CA)

## (undated) DEVICE — NEPTUNE 4 PORT MANIFOLD - (20/PK)

## (undated) DEVICE — TOWELS CLOTH SURGICAL - (4/PK 20PK/CA)

## (undated) DEVICE — SODIUM CHL IRRIGATION 0.9% 1000ML (12EA/CA)

## (undated) DEVICE — NEEDLE SPINAL NON-SAFETY 25GA X 3 (25EA/BX)"

## (undated) DEVICE — ELECTRODE DUAL RETURN W/ CORD - (50/PK)

## (undated) DEVICE — SET LEADWIRE 5 LEAD BEDSIDE DISPOSABLE ECG (1SET OF 5/EA)

## (undated) DEVICE — TUBE CONNECT SUCTION CLEAR 120 X 1/4" (50EA/CA)"

## (undated) DEVICE — CANISTER SUCTION 3000ML MECHANICAL FILTER AUTO SHUTOFF MEDI-VAC NONSTERILE LF DISP  (40EA/CA)

## (undated) DEVICE — ANTI-FOG SOLUTION - 60BTL/CA

## (undated) DEVICE — LACTATED RINGERS INJ 1000 ML - (14EA/CA 60CA/PF)

## (undated) DEVICE — PROTECTOR ULNA NERVE - (36PR/CA)

## (undated) DEVICE — PATTIES SURG X-RAYCOTTONOID - 1/2 X 3 IN (200/CA)

## (undated) DEVICE — MASK ANESTHESIA ADULT  - (100/CA)

## (undated) DEVICE — KIT  I.V. START (100EA/CA)

## (undated) DEVICE — DRESSING MASS EYE & EAR SIZE 2 (2EA/PK  50PK/BX  100EA/BX)

## (undated) DEVICE — PACK ENT OR - (2EA/CA)

## (undated) DEVICE — WIPE INSTRUMENT MICROWIPE (20EA/SP)

## (undated) DEVICE — GLOVE BIOGEL INDICATOR SZ 7.5 SURGICAL PF LTX - (50PR/BX 4BX/CA)

## (undated) DEVICE — SET EXTENSION WITH 2 PORTS (48EA/CA) ***PART #2C8610 IS A SUBSTITUTE*****

## (undated) DEVICE — TUBING ENDOSCRUB II (5EA/PK)

## (undated) DEVICE — HEAD HOLDER JUNIOR/ADULT

## (undated) DEVICE — GOWN WARMING STANDARD FLEX - (30/CA)

## (undated) DEVICE — CATHETER IV 20 GA X 1-1/4 ---SURG.& SDS ONLY--- (50EA/BX)

## (undated) DEVICE — SUTURE GENERAL

## (undated) DEVICE — TUBE CONNECTING SUCTION - CLEAR PLASTIC STERILE 72 IN (50EA/CA)

## (undated) DEVICE — SENSOR SPO2 NEO LNCS ADHESIVE (20/BX) SEE USER NOTES

## (undated) DEVICE — TUBING CLEARLINK DUO-VENT - C-FLO (48EA/CA)

## (undated) DEVICE — CANISTER SUCTION RIGID RED 1500CC (40EA/CA)

## (undated) DEVICE — SUCTION INSTRUMENT YANKAUER BULBOUS TIP W/O VENT (50EA/CA)

## (undated) DEVICE — KIT ANESTHESIA W/CIRCUIT & 3/LT BAG W/FILTER (20EA/CA)

## (undated) DEVICE — PEN SKIN MARKER W/RULER - (50EA/BX)

## (undated) DEVICE — ELECTRODE 850 FOAM ADHESIVE - HYDROGEL RADIOTRNSPRNT (50/PK)

## (undated) DEVICE — WATER IRRIGATION STERILE 1000ML (12EA/CA)

## (undated) DEVICE — SPONGE GAUZESTER. 2X2 4-PL - (2/PK 50PK/BX 30BX/CS)

## (undated) DEVICE — SPECIMEN PLASTIC CONVERTOR - (10/CA)

## (undated) DEVICE — SUTURE 4-0 PLAIN GUT SC-1 ETHICON (36PK/BX)

## (undated) DEVICE — BLADE STRAIGHT SINUS (5EA/PK)

## (undated) DEVICE — Device

## (undated) DEVICE — SOD. CHL. INJ. 0.9% 250 ML - (36/CA 50CA/PF)

## (undated) DEVICE — TRACKER ENT PATIENT

## (undated) DEVICE — TRACKER ENT INSTRUMENT

## (undated) DEVICE — SLEEVE VASO CALF MED - (10PR/CA)

## (undated) DEVICE — GLOVE BIOGEL SZ 7.5 SURGICAL PF LTX - (50PR/BX 4BX/CA)

## (undated) DEVICE — SYRINGE DISP. 60 CC LL - (30/BX, 12BX/CA)**WHEN THESE ARE GONE ORDER #500206**

## (undated) DEVICE — DRAPE LARGE 3 QUARTER - (20/CA)

## (undated) DEVICE — BLADE CURVED RAD 90 DEGREE 3.5MM X 11CM (3EA/PK)

## (undated) DEVICE — GOWN SURGEONS X-LARGE - DISP. (30/CA)

## (undated) DEVICE — GLOVE BIOGEL ECLIPSE PF LATEX SIZE 7.5

## (undated) DEVICE — SHEATH SHARP SITE 30 DEGREE ENDOSCRUB II (5EA/PK)